# Patient Record
Sex: FEMALE | Race: WHITE | Employment: UNEMPLOYED | URBAN - METROPOLITAN AREA
[De-identification: names, ages, dates, MRNs, and addresses within clinical notes are randomized per-mention and may not be internally consistent; named-entity substitution may affect disease eponyms.]

---

## 2017-01-11 ENCOUNTER — TELEPHONE (OUTPATIENT)
Dept: RHEUMATOLOGY | Age: 52
End: 2017-01-11

## 2017-01-11 ENCOUNTER — TELEPHONE (OUTPATIENT)
Dept: INTERNAL MEDICINE | Age: 52
End: 2017-01-11

## 2017-01-11 RX ORDER — ZOLPIDEM TARTRATE 10 MG/1
TABLET ORAL
Qty: 30 TABLET | Refills: 0 | OUTPATIENT
Start: 2017-01-11 | End: 2017-02-21 | Stop reason: SDUPTHER

## 2017-01-11 RX ORDER — PROMETHAZINE HYDROCHLORIDE 25 MG/1
25 TABLET ORAL EVERY 6 HOURS PRN
Qty: 25 TABLET | Refills: 0 | Status: SHIPPED | OUTPATIENT
Start: 2017-01-11 | End: 2017-02-14

## 2017-02-14 ENCOUNTER — OFFICE VISIT (OUTPATIENT)
Dept: INTERNAL MEDICINE | Age: 52
End: 2017-02-14

## 2017-02-14 VITALS
BODY MASS INDEX: 28.58 KG/M2 | DIASTOLIC BLOOD PRESSURE: 78 MMHG | WEIGHT: 193 LBS | SYSTOLIC BLOOD PRESSURE: 110 MMHG | HEART RATE: 84 BPM | HEIGHT: 69 IN

## 2017-02-14 DIAGNOSIS — F43.22 ADJUSTMENT DISORDER WITH ANXIETY: ICD-10-CM

## 2017-02-14 DIAGNOSIS — R60.9 WATER RETENTION: ICD-10-CM

## 2017-02-14 DIAGNOSIS — F51.04 CHRONIC INSOMNIA: ICD-10-CM

## 2017-02-14 DIAGNOSIS — J45.909 UNCOMPLICATED ASTHMA, UNSPECIFIED ASTHMA SEVERITY: ICD-10-CM

## 2017-02-14 DIAGNOSIS — I10 ESSENTIAL HYPERTENSION: Primary | ICD-10-CM

## 2017-02-14 DIAGNOSIS — I10 ESSENTIAL HYPERTENSION: ICD-10-CM

## 2017-02-14 DIAGNOSIS — M25.50 ARTHRALGIA, UNSPECIFIED JOINT: ICD-10-CM

## 2017-02-14 LAB — TSH REFLEX FT4: 1.95 UIU/ML (ref 0.27–4.2)

## 2017-02-14 PROCEDURE — 99214 OFFICE O/P EST MOD 30 MIN: CPT | Performed by: INTERNAL MEDICINE

## 2017-02-14 RX ORDER — TORSEMIDE 20 MG/1
20 TABLET ORAL DAILY PRN
Qty: 30 TABLET | Refills: 3 | Status: SHIPPED | OUTPATIENT
Start: 2017-02-14 | End: 2017-06-21 | Stop reason: SDUPTHER

## 2017-02-14 RX ORDER — MIRTAZAPINE 7.5 MG/1
7.5 TABLET, FILM COATED ORAL NIGHTLY
Qty: 30 TABLET | Refills: 5 | Status: SHIPPED | OUTPATIENT
Start: 2017-02-14 | End: 2018-01-17

## 2017-02-14 ASSESSMENT — PATIENT HEALTH QUESTIONNAIRE - PHQ9
2. FEELING DOWN, DEPRESSED OR HOPELESS: 0
SUM OF ALL RESPONSES TO PHQ QUESTIONS 1-9: 0
1. LITTLE INTEREST OR PLEASURE IN DOING THINGS: 0
SUM OF ALL RESPONSES TO PHQ9 QUESTIONS 1 & 2: 0

## 2017-02-14 ASSESSMENT — ENCOUNTER SYMPTOMS
GASTROINTESTINAL NEGATIVE: 1
WHEEZING: 1

## 2017-02-15 ENCOUNTER — TELEPHONE (OUTPATIENT)
Dept: INTERNAL MEDICINE | Age: 52
End: 2017-02-15

## 2017-02-21 ENCOUNTER — TELEPHONE (OUTPATIENT)
Dept: INTERNAL MEDICINE | Age: 52
End: 2017-02-21

## 2017-02-21 RX ORDER — ZOLPIDEM TARTRATE 10 MG/1
TABLET ORAL
Qty: 30 TABLET | Refills: 1 | OUTPATIENT
Start: 2017-02-21 | End: 2017-04-17 | Stop reason: SDUPTHER

## 2017-02-27 ENCOUNTER — OFFICE VISIT (OUTPATIENT)
Dept: RHEUMATOLOGY | Age: 52
End: 2017-02-27

## 2017-02-27 VITALS
DIASTOLIC BLOOD PRESSURE: 72 MMHG | WEIGHT: 193.2 LBS | TEMPERATURE: 98.3 F | BODY MASS INDEX: 28.61 KG/M2 | HEIGHT: 69 IN | SYSTOLIC BLOOD PRESSURE: 110 MMHG

## 2017-02-27 DIAGNOSIS — M25.562 CHRONIC PAIN OF BOTH KNEES: Primary | ICD-10-CM

## 2017-02-27 DIAGNOSIS — M25.561 CHRONIC PAIN OF BOTH KNEES: Primary | ICD-10-CM

## 2017-02-27 DIAGNOSIS — G89.29 CHRONIC PAIN OF RIGHT KNEE: ICD-10-CM

## 2017-02-27 DIAGNOSIS — R76.8 POSITIVE ANA (ANTINUCLEAR ANTIBODY): ICD-10-CM

## 2017-02-27 DIAGNOSIS — M25.561 CHRONIC PAIN OF RIGHT KNEE: ICD-10-CM

## 2017-02-27 DIAGNOSIS — G89.29 CHRONIC PAIN OF BOTH KNEES: Primary | ICD-10-CM

## 2017-02-27 PROCEDURE — 99214 OFFICE O/P EST MOD 30 MIN: CPT | Performed by: INTERNAL MEDICINE

## 2017-02-27 RX ORDER — TRAZODONE HYDROCHLORIDE 100 MG/1
100 TABLET ORAL NIGHTLY
COMMUNITY
End: 2017-06-28 | Stop reason: SDUPTHER

## 2017-04-14 ENCOUNTER — TELEPHONE (OUTPATIENT)
Dept: INTERNAL MEDICINE | Age: 52
End: 2017-04-14

## 2017-04-17 RX ORDER — ZOLPIDEM TARTRATE 10 MG/1
TABLET ORAL
Qty: 30 TABLET | Refills: 1 | Status: SHIPPED | OUTPATIENT
Start: 2017-04-17 | End: 2017-06-14 | Stop reason: SDUPTHER

## 2017-06-13 ENCOUNTER — OFFICE VISIT (OUTPATIENT)
Dept: RHEUMATOLOGY | Age: 52
End: 2017-06-13

## 2017-06-13 VITALS
SYSTOLIC BLOOD PRESSURE: 120 MMHG | TEMPERATURE: 98 F | HEIGHT: 69 IN | DIASTOLIC BLOOD PRESSURE: 80 MMHG | BODY MASS INDEX: 29.33 KG/M2 | WEIGHT: 198 LBS

## 2017-06-13 DIAGNOSIS — M19.90 INFLAMMATORY ARTHRITIS: Primary | ICD-10-CM

## 2017-06-13 DIAGNOSIS — M15.9 PRIMARY OSTEOARTHRITIS INVOLVING MULTIPLE JOINTS: ICD-10-CM

## 2017-06-13 DIAGNOSIS — M19.90 INFLAMMATORY ARTHRITIS: ICD-10-CM

## 2017-06-13 LAB
C-REACTIVE PROTEIN: 2 MG/L (ref 0–5.1)
SEDIMENTATION RATE, ERYTHROCYTE: 10 MM/HR (ref 0–30)

## 2017-06-13 PROCEDURE — 99214 OFFICE O/P EST MOD 30 MIN: CPT | Performed by: INTERNAL MEDICINE

## 2017-06-13 RX ORDER — HYDROXYCHLOROQUINE SULFATE 200 MG/1
TABLET, FILM COATED ORAL
Qty: 60 TABLET | Refills: 2 | Status: SHIPPED | OUTPATIENT
Start: 2017-06-13 | End: 2017-06-28

## 2017-06-13 RX ORDER — PREDNISONE 10 MG/1
TABLET ORAL
Qty: 25 TABLET | Refills: 0 | Status: SHIPPED | OUTPATIENT
Start: 2017-06-13 | End: 2017-06-28

## 2017-06-14 ENCOUNTER — TELEPHONE (OUTPATIENT)
Dept: RHEUMATOLOGY | Age: 52
End: 2017-06-14

## 2017-06-14 RX ORDER — ZOLPIDEM TARTRATE 10 MG/1
TABLET ORAL
Qty: 30 TABLET | Refills: 1 | OUTPATIENT
Start: 2017-06-14 | End: 2017-08-11 | Stop reason: SDUPTHER

## 2017-06-21 RX ORDER — TORSEMIDE 20 MG/1
TABLET ORAL
Qty: 30 TABLET | Refills: 2 | Status: SHIPPED | OUTPATIENT
Start: 2017-06-21 | End: 2017-09-26 | Stop reason: SDUPTHER

## 2017-06-22 ENCOUNTER — TELEPHONE (OUTPATIENT)
Dept: INTERNAL MEDICINE | Age: 52
End: 2017-06-22

## 2017-06-28 ENCOUNTER — OFFICE VISIT (OUTPATIENT)
Dept: INTERNAL MEDICINE | Age: 52
End: 2017-06-28

## 2017-06-28 VITALS
SYSTOLIC BLOOD PRESSURE: 130 MMHG | WEIGHT: 197 LBS | BODY MASS INDEX: 29.18 KG/M2 | DIASTOLIC BLOOD PRESSURE: 90 MMHG | HEIGHT: 69 IN | HEART RATE: 84 BPM

## 2017-06-28 DIAGNOSIS — N95.1 MENOPAUSAL SYMPTOMS: ICD-10-CM

## 2017-06-28 DIAGNOSIS — F43.22 ADJUSTMENT DISORDER WITH ANXIETY: ICD-10-CM

## 2017-06-28 DIAGNOSIS — G47.30 SLEEP APNEA, UNSPECIFIED TYPE: ICD-10-CM

## 2017-06-28 DIAGNOSIS — R60.9 WATER RETENTION: ICD-10-CM

## 2017-06-28 DIAGNOSIS — I10 ESSENTIAL HYPERTENSION: ICD-10-CM

## 2017-06-28 DIAGNOSIS — F51.04 CHRONIC INSOMNIA: ICD-10-CM

## 2017-06-28 DIAGNOSIS — M25.50 ARTHRALGIA, UNSPECIFIED JOINT: ICD-10-CM

## 2017-06-28 DIAGNOSIS — F10.10 ALCOHOL ABUSE: ICD-10-CM

## 2017-06-28 LAB — FOLLICLE STIMULATING HORMONE: 75.8 MIU/ML

## 2017-06-28 PROCEDURE — 99214 OFFICE O/P EST MOD 30 MIN: CPT | Performed by: INTERNAL MEDICINE

## 2017-06-28 RX ORDER — TRAZODONE HYDROCHLORIDE 100 MG/1
TABLET ORAL
Qty: 60 TABLET | Refills: 5 | Status: SHIPPED | OUTPATIENT
Start: 2017-06-28 | End: 2017-12-07 | Stop reason: SDUPTHER

## 2017-06-28 RX ORDER — ESTRADIOL 0.5 MG/1
0.5 TABLET ORAL DAILY
Qty: 30 TABLET | Refills: 5 | Status: SHIPPED | OUTPATIENT
Start: 2017-06-28 | End: 2018-01-17

## 2017-06-28 RX ORDER — POTASSIUM CHLORIDE 750 MG/1
10 CAPSULE, EXTENDED RELEASE ORAL DAILY
Qty: 90 CAPSULE | Refills: 3 | Status: SHIPPED | OUTPATIENT
Start: 2017-06-28 | End: 2018-07-26 | Stop reason: SDUPTHER

## 2017-06-28 ASSESSMENT — ENCOUNTER SYMPTOMS: SHORTNESS OF BREATH: 0

## 2017-08-11 RX ORDER — ZOLPIDEM TARTRATE 10 MG/1
TABLET ORAL
Qty: 30 TABLET | Refills: 1 | OUTPATIENT
Start: 2017-08-11 | End: 2017-10-06 | Stop reason: SDUPTHER

## 2017-09-05 ENCOUNTER — OFFICE VISIT (OUTPATIENT)
Dept: INTERNAL MEDICINE | Age: 52
End: 2017-09-05

## 2017-09-05 VITALS
SYSTOLIC BLOOD PRESSURE: 138 MMHG | DIASTOLIC BLOOD PRESSURE: 88 MMHG | HEIGHT: 69 IN | WEIGHT: 203 LBS | BODY MASS INDEX: 30.07 KG/M2 | TEMPERATURE: 98.4 F

## 2017-09-05 DIAGNOSIS — J45.909 UNCOMPLICATED ASTHMA, UNSPECIFIED ASTHMA SEVERITY: ICD-10-CM

## 2017-09-05 DIAGNOSIS — F43.22 ADJUSTMENT DISORDER WITH ANXIETY: ICD-10-CM

## 2017-09-05 DIAGNOSIS — K21.9 GASTROESOPHAGEAL REFLUX DISEASE, ESOPHAGITIS PRESENCE NOT SPECIFIED: ICD-10-CM

## 2017-09-05 DIAGNOSIS — F51.04 CHRONIC INSOMNIA: ICD-10-CM

## 2017-09-05 PROCEDURE — 99214 OFFICE O/P EST MOD 30 MIN: CPT | Performed by: INTERNAL MEDICINE

## 2017-09-05 RX ORDER — OMEPRAZOLE 40 MG/1
40 CAPSULE, DELAYED RELEASE ORAL DAILY
Qty: 30 CAPSULE | Refills: 3 | Status: SHIPPED | OUTPATIENT
Start: 2017-09-05 | End: 2017-12-07 | Stop reason: SDUPTHER

## 2017-09-05 RX ORDER — OMEPRAZOLE 40 MG/1
40 CAPSULE, DELAYED RELEASE ORAL DAILY
Qty: 30 CAPSULE | Refills: 3 | Status: SHIPPED | OUTPATIENT
Start: 2017-09-05 | End: 2017-09-05 | Stop reason: SDUPTHER

## 2017-09-05 ASSESSMENT — ENCOUNTER SYMPTOMS
TROUBLE SWALLOWING: 1
ABDOMINAL PAIN: 0
WHEEZING: 1
NAUSEA: 1
ABDOMINAL DISTENTION: 0

## 2017-09-20 ENCOUNTER — HOSPITAL ENCOUNTER (OUTPATIENT)
Dept: MAMMOGRAPHY | Age: 52
Discharge: HOME | End: 2017-09-20
Attending: OBSTETRICS & GYNECOLOGY | Admitting: OBSTETRICS & GYNECOLOGY

## 2017-09-20 DIAGNOSIS — Z12.31 VISIT FOR SCREENING MAMMOGRAM: ICD-10-CM

## 2017-09-27 RX ORDER — TORSEMIDE 20 MG/1
TABLET ORAL
Qty: 30 TABLET | Refills: 1 | Status: SHIPPED | OUTPATIENT
Start: 2017-09-27 | End: 2017-11-28 | Stop reason: SDUPTHER

## 2017-10-06 ENCOUNTER — TELEPHONE (OUTPATIENT)
Dept: INTERNAL MEDICINE | Age: 52
End: 2017-10-06

## 2017-10-06 RX ORDER — ZOLPIDEM TARTRATE 10 MG/1
TABLET ORAL
Qty: 6 TABLET | Refills: 0 | OUTPATIENT
Start: 2017-10-06 | End: 2017-10-18 | Stop reason: SDUPTHER

## 2017-10-09 ENCOUNTER — TELEPHONE (OUTPATIENT)
Dept: INTERNAL MEDICINE | Age: 52
End: 2017-10-09

## 2017-10-10 ENCOUNTER — TELEPHONE (OUTPATIENT)
Dept: INTERNAL MEDICINE | Age: 52
End: 2017-10-10

## 2017-10-13 ENCOUNTER — OFFICE VISIT (OUTPATIENT)
Dept: CARDIOLOGY CLINIC | Age: 52
End: 2017-10-13

## 2017-10-13 VITALS
HEART RATE: 68 BPM | SYSTOLIC BLOOD PRESSURE: 120 MMHG | DIASTOLIC BLOOD PRESSURE: 70 MMHG | BODY MASS INDEX: 29.56 KG/M2 | WEIGHT: 200.2 LBS

## 2017-10-13 DIAGNOSIS — I10 ESSENTIAL HYPERTENSION: ICD-10-CM

## 2017-10-13 DIAGNOSIS — I48.0 PAROXYSMAL ATRIAL FIBRILLATION (HCC): Primary | ICD-10-CM

## 2017-10-13 PROCEDURE — 93000 ELECTROCARDIOGRAM COMPLETE: CPT | Performed by: INTERNAL MEDICINE

## 2017-10-13 PROCEDURE — 99204 OFFICE O/P NEW MOD 45 MIN: CPT | Performed by: INTERNAL MEDICINE

## 2017-10-13 RX ORDER — FLECAINIDE ACETATE 50 MG/1
TABLET ORAL
Refills: 0 | COMMUNITY
Start: 2017-10-11 | End: 2017-11-07 | Stop reason: SDUPTHER

## 2017-10-13 RX ORDER — APIXABAN 5 MG/1
TABLET, FILM COATED ORAL
Refills: 0 | COMMUNITY
Start: 2017-10-09 | End: 2017-11-07 | Stop reason: SDUPTHER

## 2017-10-13 ASSESSMENT — ENCOUNTER SYMPTOMS
CHOKING: 0
COUGH: 0
CHEST TIGHTNESS: 0
SHORTNESS OF BREATH: 0

## 2017-10-13 NOTE — PROGRESS NOTES
Subjective:      Patient ID: Fermin Richardson is a 47 y.o. female. HPI Referred for afib. Admitted in [address] [place] [address] with afib with RVR. Awoke with afib Sunday. Tachycardic, sick and weak. No chest pain. No syncope. Felt light headed. In hospital finally used flecanide. Converted. 3 nights ago had HA and palp. Went back to hospital showed PVC. Doing ok since back. No previous heart issues. May have been having palp over several months. Lasting 10-15 minutes. Drinks occasionally. Nothing heavy. No drugs. Exercises daily. No exertional sx. .    Past Medical History:   Diagnosis Date    Alcohol abuse, in remission     Anxiety state, unspecified     Depressive disorder, not elsewhere classified     Diffuse cystic mastopathy     Extrinsic asthma, unspecified     Hypertension     Leiomyoma of uterus, unspecified     Other and unspecified ovarian cyst      Past Surgical History:   Procedure Laterality Date    CHOLECYSTECTOMY      HYSTERECTOMY       Social History     Social History    Marital status:      Spouse name: N/A    Number of children: N/A    Years of education: N/A     Occupational History    Not on file. Social History Main Topics    Smoking status: Never Smoker    Smokeless tobacco: Never Used    Alcohol use 0.6 - 1.2 oz/week     1 - 2 Glasses of wine per week      Comment: 2 drinks qd    Drug use: No    Sexual activity: Yes     Partners: Male     Other Topics Concern    Not on file     Social History Narrative    No narrative on file       Vitals:    10/13/17 1101   BP: 120/70   Pulse: 68     \   Review of Systems   Constitutional: Negative for activity change, appetite change and fatigue. Respiratory: Negative for cough, choking, chest tightness and shortness of breath. Cardiovascular: Negative for chest pain, palpitations and leg swelling. Denies PND or orthopnea. No tachycardia or syncope.     Neurological: Negative for dizziness, syncope and light-headedness. Psychiatric/Behavioral: Negative for agitation, behavioral problems and confusion. Otherwise negative as done. Objective:   Physical Exam   Constitutional: She is oriented to person, place, and time. She appears well-developed and well-nourished. No distress. HENT:   Head: Normocephalic and atraumatic. Eyes: Conjunctivae and EOM are normal. Right eye exhibits no discharge. Left eye exhibits no discharge. Neck: Normal range of motion. No JVD present. Cardiovascular: Normal rate, regular rhythm, S1 normal, S2 normal and normal heart sounds. Exam reveals no gallop. No murmur heard. Pulses:       Radial pulses are 2+ on the right side, and 2+ on the left side. Pulmonary/Chest: Effort normal and breath sounds normal. No respiratory distress. She has no wheezes. She has no rales. Abdominal: Soft. Bowel sounds are normal. There is no tenderness. Musculoskeletal: Normal range of motion. She exhibits no edema or tenderness. Neurological: She is alert and oriented to person, place, and time. Skin: Skin is warm and dry. Psychiatric: She has a normal mood and affect. Her behavior is normal. Thought content normal.       Assessment:      1. Paroxysmal atrial fibrillation (HCC)  EKG 12 Lead   2. Essential hypertension  EKG 12 Lead            Plan:      Had bout of afib. EKG today shows NSR, WNL. On eliquis. On flecanide. Stable since home. Outside records. Head CT negative. CTPA negative for PE. Echo showing normal LV function mild TR with RVSP 36. myoview non dx pharm but negative for ischemia. Continue AC and flecanide. Follow up 4-6 wks.

## 2017-10-18 RX ORDER — ZOLPIDEM TARTRATE 10 MG/1
TABLET ORAL
Qty: 30 TABLET | Refills: 1 | OUTPATIENT
Start: 2017-10-18 | End: 2017-12-07 | Stop reason: SDUPTHER

## 2017-11-03 ENCOUNTER — OFFICE VISIT (OUTPATIENT)
Dept: CARDIOLOGY CLINIC | Age: 52
End: 2017-11-03

## 2017-11-03 VITALS
WEIGHT: 201 LBS | BODY MASS INDEX: 29.68 KG/M2 | HEART RATE: 68 BPM | SYSTOLIC BLOOD PRESSURE: 108 MMHG | DIASTOLIC BLOOD PRESSURE: 70 MMHG

## 2017-11-03 DIAGNOSIS — I48.0 PAROXYSMAL ATRIAL FIBRILLATION (HCC): Primary | ICD-10-CM

## 2017-11-03 DIAGNOSIS — I10 ESSENTIAL HYPERTENSION: ICD-10-CM

## 2017-11-03 PROCEDURE — 99213 OFFICE O/P EST LOW 20 MIN: CPT | Performed by: INTERNAL MEDICINE

## 2017-11-03 ASSESSMENT — ENCOUNTER SYMPTOMS
SHORTNESS OF BREATH: 0
COUGH: 0
CHEST TIGHTNESS: 0
CHOKING: 0

## 2017-11-03 NOTE — PROGRESS NOTES
Subjective:      Patient ID: Amarilys Gonzalez is a 47 y.o. female. Fatigue   Associated symptoms include fatigue. Pertinent negatives include no chest pain or coughing. Here for follow up afib/htn. Had some vague chest sx. Belching helped. Under stress. Active. No exertional.   No tachycardia. No pnd or orthopnea. She is lethargic. Past Medical History:   Diagnosis Date    Alcohol abuse, in remission     Anxiety state, unspecified     Depressive disorder, not elsewhere classified     Diffuse cystic mastopathy     Extrinsic asthma, unspecified     Hypertension     Leiomyoma of uterus, unspecified     Other and unspecified ovarian cyst      Past Surgical History:   Procedure Laterality Date    CHOLECYSTECTOMY      HYSTERECTOMY       Social History     Social History    Marital status:      Spouse name: N/A    Number of children: N/A    Years of education: N/A     Occupational History    Not on file. Social History Main Topics    Smoking status: Never Smoker    Smokeless tobacco: Never Used    Alcohol use 0.6 - 1.2 oz/week     1 - 2 Glasses of wine per week      Comment: 2 drinks qd    Drug use: No    Sexual activity: Yes     Partners: Male     Other Topics Concern    Not on file     Social History Narrative    No narrative on file       Vitals:    11/03/17 1044   BP: 108/70   Pulse: 68       Review of Systems   Constitutional: Positive for fatigue. Negative for activity change and appetite change. Respiratory: Negative for cough, choking, chest tightness and shortness of breath. Cardiovascular: Negative for chest pain, palpitations and leg swelling. Denies PND or orthopnea. No tachycardia or syncope. Neurological: Negative for dizziness, syncope and light-headedness. Psychiatric/Behavioral: Negative for agitation, behavioral problems and confusion. Other systems reviewed negative as done.      Objective:   Physical Exam   Constitutional: She is oriented to person, place, and time. She appears well-developed and well-nourished. No distress. HENT:   Head: Normocephalic and atraumatic. Eyes: Conjunctivae and EOM are normal. Right eye exhibits no discharge. Left eye exhibits no discharge. Neck: Normal range of motion. No JVD present. Cardiovascular: Normal rate, regular rhythm, S1 normal, S2 normal and normal heart sounds. Exam reveals no gallop. No murmur heard. Pulses:       Radial pulses are 2+ on the right side, and 2+ on the left side. Pulmonary/Chest: Effort normal and breath sounds normal. No respiratory distress. She has no wheezes. She has no rales. Abdominal: Soft. Bowel sounds are normal. There is no tenderness. Musculoskeletal: Normal range of motion. She exhibits no edema or tenderness. Neurological: She is alert and oriented to person, place, and time. Skin: Skin is warm and dry. Psychiatric: She has a normal mood and affect. Her behavior is normal. Thought content normal.       Assessment:      1. Paroxysmal atrial fibrillation (HCC)     2. Essential hypertension              Plan:      CV stable. Rhythm appears stable. On eliquis. No bleeding issues. On flecanide. Reviewed outside records. Head CT negative. CTPA negative for PE. Echo showing normal LV function mild TR with RVSP 36. myoview non dx pharm but negative for ischemia. Continue AC and flecanide. Follow up 2 months. [facility]

## 2017-11-07 RX ORDER — FLECAINIDE ACETATE 50 MG/1
TABLET ORAL
Qty: 60 TABLET | Refills: 5 | Status: SHIPPED | OUTPATIENT
Start: 2017-11-07 | End: 2017-12-07 | Stop reason: SDUPTHER

## 2017-11-10 ENCOUNTER — TELEPHONE (OUTPATIENT)
Dept: CARDIOLOGY CLINIC | Age: 52
End: 2017-11-10

## 2017-11-10 RX ORDER — APIXABAN 5 MG/1
TABLET, FILM COATED ORAL
Qty: 60 TABLET | Refills: 5 | Status: SHIPPED | OUTPATIENT
Start: 2017-11-10 | End: 2017-12-07 | Stop reason: SDUPTHER

## 2017-11-10 RX ORDER — APIXABAN 5 MG/1
TABLET, FILM COATED ORAL
Qty: 60 TABLET | Refills: 5 | OUTPATIENT
Start: 2017-11-10

## 2017-11-28 ENCOUNTER — TELEPHONE (OUTPATIENT)
Dept: INTERNAL MEDICINE | Age: 52
End: 2017-11-28

## 2017-11-28 RX ORDER — TORSEMIDE 20 MG/1
TABLET ORAL
Qty: 30 TABLET | Refills: 2 | Status: SHIPPED | OUTPATIENT
Start: 2017-11-28 | End: 2017-12-07 | Stop reason: SDUPTHER

## 2017-11-28 NOTE — TELEPHONE ENCOUNTER
Asking for 4 day early refill around 12/9 on her  Ambien, Trazodone & Potassium.     Asked her to call back around then if MD approves

## 2017-12-07 ENCOUNTER — TELEPHONE (OUTPATIENT)
Dept: INTERNAL MEDICINE | Age: 52
End: 2017-12-07

## 2017-12-07 RX ORDER — FLECAINIDE ACETATE 50 MG/1
TABLET ORAL
Qty: 60 TABLET | Refills: 5 | Status: SHIPPED | OUTPATIENT
Start: 2017-12-07 | End: 2018-05-27 | Stop reason: SDUPTHER

## 2017-12-07 RX ORDER — ZOLPIDEM TARTRATE 10 MG/1
TABLET ORAL
Qty: 30 TABLET | Refills: 1 | OUTPATIENT
Start: 2017-12-07 | End: 2018-02-05 | Stop reason: SDUPTHER

## 2017-12-07 RX ORDER — PREDNISONE 10 MG/1
TABLET ORAL
Qty: 22 TABLET | Refills: 0 | Status: SHIPPED | OUTPATIENT
Start: 2017-12-07 | End: 2018-01-17

## 2017-12-07 RX ORDER — TRAZODONE HYDROCHLORIDE 100 MG/1
TABLET ORAL
Qty: 60 TABLET | Refills: 5 | Status: SHIPPED | OUTPATIENT
Start: 2017-12-07 | End: 2018-05-27 | Stop reason: SDUPTHER

## 2017-12-07 RX ORDER — AZITHROMYCIN 250 MG/1
TABLET, FILM COATED ORAL
Qty: 1 PACKET | Refills: 0 | Status: SHIPPED | OUTPATIENT
Start: 2017-12-07 | End: 2017-12-17

## 2017-12-07 RX ORDER — OMEPRAZOLE 40 MG/1
40 CAPSULE, DELAYED RELEASE ORAL DAILY
Qty: 30 CAPSULE | Refills: 3 | Status: SHIPPED | OUTPATIENT
Start: 2017-12-07 | End: 2018-05-27 | Stop reason: SDUPTHER

## 2017-12-07 RX ORDER — APIXABAN 5 MG/1
TABLET, FILM COATED ORAL
Qty: 60 TABLET | Refills: 5 | Status: SHIPPED | OUTPATIENT
Start: 2017-12-07 | End: 2018-07-27 | Stop reason: SDUPTHER

## 2017-12-07 RX ORDER — TORSEMIDE 20 MG/1
TABLET ORAL
Qty: 30 TABLET | Refills: 2 | Status: SHIPPED | OUTPATIENT
Start: 2017-12-07 | End: 2018-04-20 | Stop reason: SDUPTHER

## 2017-12-07 NOTE — TELEPHONE ENCOUNTER
Mahi calling from [employer] and would like to be advised if Spring Vasquez MD is ok with pt taking flecainide (TAMBOCOR) 50 MG tablet and z jose due to increase risk in arrhythmias   Aware that Spring Vasquez MD out however message will be sent   Please advise

## 2017-12-07 NOTE — TELEPHONE ENCOUNTER
Pt would like to know if Dr. Sacha Sargent can call something in for her. Pts symptoms are cough, mucus, nasal drip, not feeling well at all. Pt sounds out of breath. Pt feels like she might need to use her inhaler. It started about 2 days ago. Pt has been taking Benadryl. Pharmacy info above. Patient also following up to written rxs due to losing her insurance. Patient would like to speak to [place] prior to us witting the rxs. Please see previous phone note for information.

## 2017-12-08 RX ORDER — DOXYCYCLINE HYCLATE 100 MG
100 TABLET ORAL 2 TIMES DAILY
Qty: 20 TABLET | Refills: 0 | OUTPATIENT
Start: 2017-12-08 | End: 2017-12-29 | Stop reason: SDUPTHER

## 2017-12-27 ENCOUNTER — TELEPHONE (OUTPATIENT)
Dept: INTERNAL MEDICINE | Age: 52
End: 2017-12-27

## 2017-12-27 RX ORDER — CIPROFLOXACIN 500 MG/1
500 TABLET, FILM COATED ORAL 2 TIMES DAILY
Qty: 20 TABLET | Refills: 0 | Status: SHIPPED | OUTPATIENT
Start: 2017-12-27 | End: 2018-01-06

## 2017-12-27 NOTE — TELEPHONE ENCOUNTER
Pt would like to know if Dr. Jmaes Puckett can call something in for her. Pt does not currently have insurance. Pt will have insurance as of January 1, 2018. Pts symptoms are thick green mucus, drainage, nose is stopped up, head is congested, no energy, a little bit of diarrhea Pt feels like she is choking on the mucus. It started on 12-8-2017. Pt finished an rx of doxycycline hyclate (VIBRA-TABS) 100 MG tablet and is currently taking Prednisone. Pharmacy info above. Please call pt.

## 2017-12-28 ENCOUNTER — TELEPHONE (OUTPATIENT)
Dept: INTERNAL MEDICINE | Age: 52
End: 2017-12-28

## 2017-12-28 RX ORDER — AZITHROMYCIN 250 MG/1
TABLET, FILM COATED ORAL
Qty: 1 PACKET | Refills: 0 | Status: SHIPPED | OUTPATIENT
Start: 2017-12-28 | End: 2018-01-07

## 2017-12-29 ENCOUNTER — TELEPHONE (OUTPATIENT)
Dept: INTERNAL MEDICINE | Age: 52
End: 2017-12-29

## 2017-12-29 RX ORDER — LEVOFLOXACIN 500 MG/1
500 TABLET, FILM COATED ORAL DAILY
Qty: 7 TABLET | Refills: 0 | OUTPATIENT
Start: 2017-12-29 | End: 2017-12-29

## 2017-12-29 RX ORDER — DOXYCYCLINE HYCLATE 100 MG
100 TABLET ORAL 2 TIMES DAILY
Qty: 20 TABLET | Refills: 0 | Status: SHIPPED | OUTPATIENT
Start: 2017-12-29 | End: 2018-01-08

## 2017-12-29 NOTE — TELEPHONE ENCOUNTER
Try Levaquin 500 mg    #7            One daily  I don't know what this will cost her, but because of her allergies are choices are limited.

## 2017-12-29 NOTE — TELEPHONE ENCOUNTER
Per the pharmacy  The medication rx'ed, Dawson Norman has a major interaction with the medicaiton flecainide (TAMBOCOR) 50 MG tab . PLease advise on another medication.

## 2018-01-08 ENCOUNTER — TELEPHONE (OUTPATIENT)
Dept: INTERNAL MEDICINE | Age: 53
End: 2018-01-08

## 2018-01-08 NOTE — TELEPHONE ENCOUNTER
Pt requesting a call back from Gerardo Cadena MD MA regarding a very complex insurance matter regarding a coding and billing issue pt was routed to [address]   Please call pt back at [phone]

## 2018-01-10 ENCOUNTER — TELEPHONE (OUTPATIENT)
Dept: INTERNAL MEDICINE | Age: 53
End: 2018-01-10

## 2018-01-15 ENCOUNTER — TELEPHONE (OUTPATIENT)
Dept: INTERNAL MEDICINE | Age: 53
End: 2018-01-15

## 2018-01-15 NOTE — TELEPHONE ENCOUNTER
Pt calling back and was seen at [facility] on 1.11.18  Pt is requesting to be seen by Tea Ortiz MD only  Please call pt back today at [phone]

## 2018-01-17 ENCOUNTER — OFFICE VISIT (OUTPATIENT)
Dept: INTERNAL MEDICINE | Age: 53
End: 2018-01-17

## 2018-01-17 VITALS
WEIGHT: 207 LBS | HEIGHT: 69 IN | DIASTOLIC BLOOD PRESSURE: 78 MMHG | SYSTOLIC BLOOD PRESSURE: 118 MMHG | TEMPERATURE: 98.4 F | BODY MASS INDEX: 30.66 KG/M2

## 2018-01-17 DIAGNOSIS — J45.901 ASTHMATIC BRONCHITIS WITH ACUTE EXACERBATION, UNSPECIFIED ASTHMA SEVERITY, UNSPECIFIED WHETHER PERSISTENT: Primary | ICD-10-CM

## 2018-01-17 PROCEDURE — 94640 AIRWAY INHALATION TREATMENT: CPT | Performed by: INTERNAL MEDICINE

## 2018-01-17 PROCEDURE — 99213 OFFICE O/P EST LOW 20 MIN: CPT | Performed by: INTERNAL MEDICINE

## 2018-01-17 RX ORDER — ALBUTEROL SULFATE 2.5 MG/3ML
2.5 SOLUTION RESPIRATORY (INHALATION) ONCE
Status: COMPLETED | OUTPATIENT
Start: 2018-01-17 | End: 2018-01-17

## 2018-01-17 RX ORDER — BENZONATATE 100 MG/1
100 CAPSULE ORAL 3 TIMES DAILY PRN
Qty: 30 CAPSULE | Refills: 1 | Status: SHIPPED | OUTPATIENT
Start: 2018-01-17 | End: 2018-01-24

## 2018-01-17 RX ADMIN — Medication 0.5 MG: at 08:29

## 2018-01-17 RX ADMIN — ALBUTEROL SULFATE 2.5 MG: 2.5 SOLUTION RESPIRATORY (INHALATION) at 08:26

## 2018-01-17 ASSESSMENT — ENCOUNTER SYMPTOMS
WHEEZING: 1
TROUBLE SWALLOWING: 0
SORE THROAT: 0
GASTROINTESTINAL NEGATIVE: 1
SHORTNESS OF BREATH: 1
COUGH: 1

## 2018-01-19 ENCOUNTER — OFFICE VISIT (OUTPATIENT)
Dept: CARDIOLOGY CLINIC | Age: 53
End: 2018-01-19

## 2018-01-19 VITALS
BODY MASS INDEX: 30.27 KG/M2 | DIASTOLIC BLOOD PRESSURE: 82 MMHG | SYSTOLIC BLOOD PRESSURE: 128 MMHG | WEIGHT: 205 LBS | HEART RATE: 62 BPM

## 2018-01-19 DIAGNOSIS — I48.0 PAROXYSMAL ATRIAL FIBRILLATION (HCC): Primary | ICD-10-CM

## 2018-01-19 DIAGNOSIS — R94.31 ABNORMAL EKG: ICD-10-CM

## 2018-01-19 DIAGNOSIS — I10 ESSENTIAL HYPERTENSION: ICD-10-CM

## 2018-01-19 PROCEDURE — 99214 OFFICE O/P EST MOD 30 MIN: CPT | Performed by: INTERNAL MEDICINE

## 2018-01-19 ASSESSMENT — ENCOUNTER SYMPTOMS
SHORTNESS OF BREATH: 0
COUGH: 0
CHOKING: 0
CHEST TIGHTNESS: 0

## 2018-02-05 ENCOUNTER — TELEPHONE (OUTPATIENT)
Dept: INTERNAL MEDICINE | Age: 53
End: 2018-02-05

## 2018-02-05 RX ORDER — ZOLPIDEM TARTRATE 10 MG/1
TABLET ORAL
Qty: 30 TABLET | Refills: 1 | OUTPATIENT
Start: 2018-02-05 | End: 2018-03-28 | Stop reason: SDUPTHER

## 2018-02-05 NOTE — TELEPHONE ENCOUNTER
Pt requesting refill zolpidem (AMBIEN) 10 MG tablet. Pt states she has been waiting a few days for refill. Pharmacy on file verified. Please call.

## 2018-02-13 DIAGNOSIS — J45.901 ASTHMATIC BRONCHITIS WITH ACUTE EXACERBATION, UNSPECIFIED ASTHMA SEVERITY, UNSPECIFIED WHETHER PERSISTENT: ICD-10-CM

## 2018-02-16 RX ORDER — FLUTICASONE FUROATE AND VILANTEROL 100; 25 UG/1; UG/1
1 POWDER RESPIRATORY (INHALATION) DAILY
Qty: 1 EACH | Refills: 5 | Status: SHIPPED | OUTPATIENT
Start: 2018-02-16 | End: 2019-09-09 | Stop reason: SDUPTHER

## 2018-02-27 DIAGNOSIS — J45.901 ASTHMATIC BRONCHITIS WITH ACUTE EXACERBATION, UNSPECIFIED ASTHMA SEVERITY, UNSPECIFIED WHETHER PERSISTENT: ICD-10-CM

## 2018-03-28 ENCOUNTER — TELEPHONE (OUTPATIENT)
Dept: INTERNAL MEDICINE | Age: 53
End: 2018-03-28

## 2018-03-28 RX ORDER — ZOLPIDEM TARTRATE 10 MG/1
TABLET ORAL
Qty: 30 TABLET | Refills: 1 | OUTPATIENT
Start: 2018-03-28 | End: 2018-05-31 | Stop reason: SDUPTHER

## 2018-04-20 ENCOUNTER — OFFICE VISIT (OUTPATIENT)
Dept: CARDIOLOGY CLINIC | Age: 53
End: 2018-04-20

## 2018-04-20 VITALS
WEIGHT: 198 LBS | HEART RATE: 68 BPM | DIASTOLIC BLOOD PRESSURE: 80 MMHG | SYSTOLIC BLOOD PRESSURE: 118 MMHG | BODY MASS INDEX: 29.24 KG/M2

## 2018-04-20 DIAGNOSIS — I10 ESSENTIAL HYPERTENSION: ICD-10-CM

## 2018-04-20 DIAGNOSIS — R94.31 ABNORMAL EKG: ICD-10-CM

## 2018-04-20 DIAGNOSIS — I48.0 PAROXYSMAL ATRIAL FIBRILLATION (HCC): Primary | ICD-10-CM

## 2018-04-20 PROCEDURE — 99214 OFFICE O/P EST MOD 30 MIN: CPT | Performed by: INTERNAL MEDICINE

## 2018-04-20 ASSESSMENT — ENCOUNTER SYMPTOMS
CHEST TIGHTNESS: 0
CHOKING: 0
SHORTNESS OF BREATH: 0
COUGH: 0

## 2018-05-29 RX ORDER — TORSEMIDE 20 MG/1
TABLET ORAL
Qty: 30 TABLET | Refills: 0 | Status: SHIPPED | OUTPATIENT
Start: 2018-05-29 | End: 2018-07-27 | Stop reason: SDUPTHER

## 2018-05-29 RX ORDER — FLECAINIDE ACETATE 50 MG/1
TABLET ORAL
Qty: 60 TABLET | Refills: 0 | Status: SHIPPED | OUTPATIENT
Start: 2018-05-29 | End: 2018-06-26 | Stop reason: SDUPTHER

## 2018-05-29 RX ORDER — TRAZODONE HYDROCHLORIDE 100 MG/1
TABLET ORAL
Qty: 60 TABLET | Refills: 0 | Status: SHIPPED | OUTPATIENT
Start: 2018-05-29 | End: 2018-06-26 | Stop reason: SDUPTHER

## 2018-05-29 RX ORDER — OMEPRAZOLE 40 MG/1
40 CAPSULE, DELAYED RELEASE ORAL DAILY
Qty: 30 CAPSULE | Refills: 0 | Status: SHIPPED | OUTPATIENT
Start: 2018-05-29 | End: 2018-06-26 | Stop reason: SDUPTHER

## 2018-05-31 ENCOUNTER — TELEPHONE (OUTPATIENT)
Dept: INTERNAL MEDICINE | Age: 53
End: 2018-05-31

## 2018-05-31 DIAGNOSIS — G47.30 SLEEP APNEA, UNSPECIFIED TYPE: Primary | ICD-10-CM

## 2018-05-31 RX ORDER — ZOLPIDEM TARTRATE 10 MG/1
TABLET ORAL
Qty: 30 TABLET | Refills: 1 | OUTPATIENT
Start: 2018-05-31 | End: 2018-06-28

## 2018-06-11 ENCOUNTER — TELEPHONE (OUTPATIENT)
Dept: INTERNAL MEDICINE | Age: 53
End: 2018-06-11

## 2018-06-11 ENCOUNTER — HOSPITAL ENCOUNTER (OUTPATIENT)
Dept: OTHER | Age: 53
Discharge: HOME | End: 2018-06-11
Attending: INTERNAL MEDICINE | Admitting: INTERNAL MEDICINE

## 2018-06-11 DIAGNOSIS — A02.9 SALMONELLA: Primary | ICD-10-CM

## 2018-06-12 ENCOUNTER — HOSPITAL ENCOUNTER (OUTPATIENT)
Dept: OTHER | Age: 53
Discharge: HOME | End: 2018-06-12
Attending: INTERNAL MEDICINE | Admitting: INTERNAL MEDICINE

## 2018-06-13 LAB — GI BACTERIAL PATHOGENS BY PCR: NORMAL

## 2018-06-26 RX ORDER — FLECAINIDE ACETATE 50 MG/1
TABLET ORAL
Qty: 60 TABLET | Refills: 0 | Status: SHIPPED | OUTPATIENT
Start: 2018-06-26 | End: 2018-07-25 | Stop reason: SDUPTHER

## 2018-06-26 RX ORDER — OMEPRAZOLE 40 MG/1
CAPSULE, DELAYED RELEASE ORAL
Qty: 30 CAPSULE | Refills: 0 | Status: SHIPPED | OUTPATIENT
Start: 2018-06-26 | End: 2018-07-25 | Stop reason: SDUPTHER

## 2018-06-26 RX ORDER — TRAZODONE HYDROCHLORIDE 100 MG/1
TABLET ORAL
Qty: 60 TABLET | Refills: 0 | Status: SHIPPED | OUTPATIENT
Start: 2018-06-26 | End: 2018-07-25 | Stop reason: SDUPTHER

## 2018-07-02 ENCOUNTER — TELEPHONE (OUTPATIENT)
Dept: CARDIOLOGY CLINIC | Age: 53
End: 2018-07-02

## 2018-07-02 DIAGNOSIS — I10 ESSENTIAL HYPERTENSION: Primary | ICD-10-CM

## 2018-07-02 DIAGNOSIS — T14.8XXA BRUISING: ICD-10-CM

## 2018-07-03 NOTE — TELEPHONE ENCOUNTER
Per Dr. Caity Elizalde  he would like for her to have her CBC and LFT's checked and consider taking coumadin. Pt Informed she will have labs checked and will research coumadin before taking it. Labs placed. Orders placed.

## 2018-07-05 DIAGNOSIS — I10 ESSENTIAL HYPERTENSION: ICD-10-CM

## 2018-07-05 DIAGNOSIS — T14.8XXA BRUISING: ICD-10-CM

## 2018-07-05 LAB
ALBUMIN SERPL-MCNC: 4.5 G/DL (ref 3.4–5)
ALP BLD-CCNC: 74 U/L (ref 40–129)
ALT SERPL-CCNC: 23 U/L (ref 10–40)
AST SERPL-CCNC: 22 U/L (ref 15–37)
BILIRUB SERPL-MCNC: 0.4 MG/DL (ref 0–1)
BILIRUBIN DIRECT: <0.2 MG/DL (ref 0–0.3)
BILIRUBIN, INDIRECT: NORMAL MG/DL (ref 0–1)
HCT VFR BLD CALC: 40.3 % (ref 36–48)
HEMOGLOBIN: 14.4 G/DL (ref 12–16)
MCH RBC QN AUTO: 33.1 PG (ref 26–34)
MCHC RBC AUTO-ENTMCNC: 35.6 G/DL (ref 31–36)
MCV RBC AUTO: 93 FL (ref 80–100)
PDW BLD-RTO: 13.2 % (ref 12.4–15.4)
PLATELET # BLD: 256 K/UL (ref 135–450)
PMV BLD AUTO: 8 FL (ref 5–10.5)
RBC # BLD: 4.34 M/UL (ref 4–5.2)
TOTAL PROTEIN: 6.7 G/DL (ref 6.4–8.2)
WBC # BLD: 7.4 K/UL (ref 4–11)

## 2018-07-06 LAB
CHOLESTEROL, TOTAL: 211 MG/DL (ref 0–199)
HDLC SERPL-MCNC: 66 MG/DL (ref 40–60)
LDL CHOLESTEROL CALCULATED: 128 MG/DL
TRIGL SERPL-MCNC: 85 MG/DL (ref 0–150)
VLDLC SERPL CALC-MCNC: 17 MG/DL

## 2018-07-16 ENCOUNTER — TELEPHONE (OUTPATIENT)
Dept: CARDIOLOGY CLINIC | Age: 53
End: 2018-07-16

## 2018-07-24 ENCOUNTER — TELEPHONE (OUTPATIENT)
Dept: INTERNAL MEDICINE | Age: 53
End: 2018-07-24

## 2018-07-24 NOTE — TELEPHONE ENCOUNTER
Pt states she need a refill for AMBIEN) 10 MG tablet. Pt requesting an appt for the week of 07/30/18. Please call.

## 2018-07-25 RX ORDER — FLECAINIDE ACETATE 50 MG/1
TABLET ORAL
Qty: 60 TABLET | Refills: 1 | Status: SHIPPED | OUTPATIENT
Start: 2018-07-25 | End: 2018-07-27 | Stop reason: SDUPTHER

## 2018-07-25 RX ORDER — TRAZODONE HYDROCHLORIDE 100 MG/1
TABLET ORAL
Qty: 60 TABLET | Refills: 1 | Status: SHIPPED | OUTPATIENT
Start: 2018-07-25 | End: 2018-09-28 | Stop reason: SDUPTHER

## 2018-07-25 RX ORDER — OMEPRAZOLE 40 MG/1
CAPSULE, DELAYED RELEASE ORAL
Qty: 30 CAPSULE | Refills: 1 | Status: SHIPPED | OUTPATIENT
Start: 2018-07-25 | End: 2018-09-28 | Stop reason: SDUPTHER

## 2018-07-26 RX ORDER — TORSEMIDE 20 MG/1
TABLET ORAL
Qty: 30 TABLET | Refills: 0 | Status: SHIPPED | OUTPATIENT
Start: 2018-07-26 | End: 2018-07-27 | Stop reason: SDUPTHER

## 2018-07-26 RX ORDER — POTASSIUM CHLORIDE 750 MG/1
10 CAPSULE, EXTENDED RELEASE ORAL DAILY
Qty: 90 CAPSULE | Refills: 3 | Status: SHIPPED | OUTPATIENT
Start: 2018-07-26 | End: 2018-07-27 | Stop reason: SDUPTHER

## 2018-07-27 ENCOUNTER — OFFICE VISIT (OUTPATIENT)
Dept: INTERNAL MEDICINE | Age: 53
End: 2018-07-27

## 2018-07-27 ENCOUNTER — OFFICE VISIT (OUTPATIENT)
Dept: CARDIOLOGY CLINIC | Age: 53
End: 2018-07-27

## 2018-07-27 ENCOUNTER — TELEPHONE (OUTPATIENT)
Dept: CARDIOLOGY CLINIC | Age: 53
End: 2018-07-27

## 2018-07-27 VITALS
HEIGHT: 69 IN | SYSTOLIC BLOOD PRESSURE: 120 MMHG | HEART RATE: 77 BPM | DIASTOLIC BLOOD PRESSURE: 80 MMHG | BODY MASS INDEX: 29.18 KG/M2 | WEIGHT: 197 LBS | OXYGEN SATURATION: 97 %

## 2018-07-27 VITALS
SYSTOLIC BLOOD PRESSURE: 120 MMHG | WEIGHT: 197 LBS | BODY MASS INDEX: 29.09 KG/M2 | HEART RATE: 72 BPM | DIASTOLIC BLOOD PRESSURE: 70 MMHG

## 2018-07-27 DIAGNOSIS — I10 ESSENTIAL HYPERTENSION: ICD-10-CM

## 2018-07-27 DIAGNOSIS — F51.04 CHRONIC INSOMNIA: Primary | ICD-10-CM

## 2018-07-27 DIAGNOSIS — E66.3 OVERWEIGHT (BMI 25.0-29.9): ICD-10-CM

## 2018-07-27 DIAGNOSIS — R60.9 WATER RETENTION: ICD-10-CM

## 2018-07-27 DIAGNOSIS — I48.0 PAROXYSMAL ATRIAL FIBRILLATION (HCC): Primary | ICD-10-CM

## 2018-07-27 DIAGNOSIS — R94.31 ABNORMAL EKG: ICD-10-CM

## 2018-07-27 PROBLEM — J45.901 ASTHMATIC BRONCHITIS WITH ACUTE EXACERBATION: Status: RESOLVED | Noted: 2018-01-17 | Resolved: 2018-07-27

## 2018-07-27 PROCEDURE — 99214 OFFICE O/P EST MOD 30 MIN: CPT | Performed by: INTERNAL MEDICINE

## 2018-07-27 RX ORDER — FLECAINIDE ACETATE 50 MG/1
50 TABLET ORAL 2 TIMES DAILY
Qty: 180 TABLET | Refills: 1 | Status: SHIPPED | OUTPATIENT
Start: 2018-07-27 | End: 2019-02-21 | Stop reason: SDUPTHER

## 2018-07-27 RX ORDER — ZOLPIDEM TARTRATE 10 MG/1
10 TABLET ORAL NIGHTLY PRN
Qty: 30 TABLET | Refills: 2 | Status: SHIPPED | OUTPATIENT
Start: 2018-07-27 | End: 2018-11-05 | Stop reason: SDUPTHER

## 2018-07-27 RX ORDER — TORSEMIDE 20 MG/1
TABLET ORAL
Qty: 90 TABLET | Refills: 3 | Status: SHIPPED | OUTPATIENT
Start: 2018-07-27 | End: 2019-08-02 | Stop reason: SDUPTHER

## 2018-07-27 RX ORDER — SPIRONOLACTONE 25 MG/1
25 TABLET ORAL DAILY
Qty: 90 TABLET | Refills: 3 | Status: SHIPPED | OUTPATIENT
Start: 2018-07-27 | End: 2018-12-12 | Stop reason: CLARIF

## 2018-07-27 RX ORDER — APIXABAN 5 MG/1
TABLET, FILM COATED ORAL
Qty: 180 TABLET | Refills: 3 | Status: SHIPPED | OUTPATIENT
Start: 2018-07-27 | End: 2019-08-02 | Stop reason: SDUPTHER

## 2018-07-27 RX ORDER — ZOLPIDEM TARTRATE 10 MG/1
TABLET ORAL NIGHTLY PRN
COMMUNITY
End: 2018-07-27 | Stop reason: SDUPTHER

## 2018-07-27 RX ORDER — ZOLPIDEM TARTRATE 10 MG/1
TABLET ORAL NIGHTLY PRN
Status: CANCELLED | OUTPATIENT
Start: 2018-07-27

## 2018-07-27 RX ORDER — POTASSIUM CHLORIDE 750 MG/1
10 CAPSULE, EXTENDED RELEASE ORAL DAILY
Qty: 90 CAPSULE | Refills: 3 | Status: SHIPPED | OUTPATIENT
Start: 2018-07-27 | End: 2018-08-22 | Stop reason: DRUGHIGH

## 2018-07-27 ASSESSMENT — PATIENT HEALTH QUESTIONNAIRE - PHQ9
SUM OF ALL RESPONSES TO PHQ QUESTIONS 1-9: 0
1. LITTLE INTEREST OR PLEASURE IN DOING THINGS: 0
2. FEELING DOWN, DEPRESSED OR HOPELESS: 0
SUM OF ALL RESPONSES TO PHQ9 QUESTIONS 1 & 2: 0

## 2018-07-27 ASSESSMENT — ENCOUNTER SYMPTOMS
RESPIRATORY NEGATIVE: 1
CHOKING: 0
GASTROINTESTINAL NEGATIVE: 1
CHEST TIGHTNESS: 0
SHORTNESS OF BREATH: 0
COUGH: 0

## 2018-07-27 NOTE — PROGRESS NOTES
SUBJECTIVE:    Patient ID: Viktoriya Britt is an 47 y.o. female. HPI: Patient here today for the f/u of chronic problems -- see Problem List and associated comments. New issues or complaints include (also see Assessment for more details):  Patient here for follow-up for zolpidem. She does well with this, sleeps well and is very functional and asked tape. 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 She enjoys her job. She feels she is doing well overall including psychologically. However she does need zolpidem for sleep. She has failed multiple other medications in the past.  She is also having problem with water retention which is relieved with a diuretic. However this causes hypokalemia and when her potassium gets low she gets significant muscle cramping. Review of Systems   Constitutional: Negative for activity change, appetite change and unexpected weight change. Respiratory: Negative. Cardiovascular: Negative. Gastrointestinal: Negative. Musculoskeletal: Positive for myalgias (only when potassiumlow). Neurological: Negative. Psychiatric/Behavioral: Positive for sleep disturbance. Negative for decreased concentration, dysphoric mood and suicidal ideas. The patient is not nervous/anxious. OBJECTIVE:    /80 (Site: Right Arm, Position: Sitting, Cuff Size: Medium Adult)   Pulse 77   Ht 5' 9\" (1.753 m)   Wt 197 lb (89.4 kg)   SpO2 97%   BMI 29.09 kg/m²      Physical Exam   Constitutional: She is oriented to person, place, and time. She appears well-developed and well-nourished. No distress. Eyes: EOM are normal. No scleral icterus. Neck: No JVD present. Carotid bruit is not present. No thyromegaly present. Cardiovascular: Normal rate, regular rhythm and normal heart sounds. Exam reveals no gallop. No murmur heard. Pulmonary/Chest: Effort normal and breath sounds normal. No stridor.  No

## 2018-07-27 NOTE — PROGRESS NOTES
Subjective:      Patient ID: Lucy Berry is a 47 y.o. female. Fatigue   Pertinent negatives include no chest pain, coughing or fatigue. Here for follow up afib/htn. Doing well. Walking daily. No exertional.   No tachycardia. No pnd or orthopnea. Past Medical History:   Diagnosis Date    Alcohol abuse, in remission     Anxiety state, unspecified     Depressive disorder, not elsewhere classified     Diffuse cystic mastopathy     Extrinsic asthma, unspecified     Hypertension     Leiomyoma of uterus, unspecified     Other and unspecified ovarian cyst      Past Surgical History:   Procedure Laterality Date    CHOLECYSTECTOMY      HYSTERECTOMY       Social History     Social History    Marital status:      Spouse name: N/A    Number of children: N/A    Years of education: N/A     Occupational History    Not on file. Social History Main Topics    Smoking status: Never Smoker    Smokeless tobacco: Never Used    Alcohol use 0.6 - 1.2 oz/week     1 - 2 Glasses of wine per week      Comment: 2 drinks qd    Drug use: No    Sexual activity: Yes     Partners: Male     Other Topics Concern    Not on file     Social History Narrative    No narrative on file       FH reviewed, noncontributory. Vitals:    07/27/18 0942   BP: 120/70   Pulse: 72       Wt 198    Review of Systems   Constitutional: Negative for activity change, appetite change and fatigue. Respiratory: Negative for cough, choking, chest tightness and shortness of breath. Cardiovascular: Negative for chest pain, palpitations and leg swelling. Denies PND or orthopnea. No tachycardia or syncope. Neurological: Negative for dizziness, syncope and light-headedness. Psychiatric/Behavioral: Negative for agitation, behavioral problems and confusion. Other systems reviewed negative as done. Objective:   Physical Exam   Constitutional: She is oriented to person, place, and time.  She appears

## 2018-07-27 NOTE — ASSESSMENT & PLAN NOTE
Patient's weight has leveled off after significant weight loss in the past.  Discussed medications which would not be appropriate in this case. She may want to visit with the dietitian againerickson has seen a dietitian in the past due to her gestational diabetes.

## 2018-08-22 ENCOUNTER — TELEPHONE (OUTPATIENT)
Dept: INTERNAL MEDICINE | Age: 53
End: 2018-08-22

## 2018-08-22 RX ORDER — POTASSIUM CHLORIDE 20 MEQ/1
20 TABLET, EXTENDED RELEASE ORAL 2 TIMES DAILY
Qty: 60 TABLET | Refills: 1 | Status: SHIPPED | OUTPATIENT
Start: 2018-08-22 | End: 2018-10-26 | Stop reason: SDUPTHER

## 2018-08-22 NOTE — TELEPHONE ENCOUNTER
Pt states she is experiencing a reaction to one of the medications she is taking spironolactone (ALDACTONE) 25 MG tablet. Pt states her hands and legs are tingling and one of her toes are numbed.     Please advise

## 2018-08-27 ENCOUNTER — TELEPHONE (OUTPATIENT)
Dept: INTERNAL MEDICINE | Age: 53
End: 2018-08-27

## 2018-08-27 RX ORDER — PREDNISONE 10 MG/1
TABLET ORAL
Qty: 22 TABLET | Refills: 0 | Status: SHIPPED | OUTPATIENT
Start: 2018-08-27 | End: 2018-12-12 | Stop reason: CLARIF

## 2018-08-27 RX ORDER — DOXYCYCLINE HYCLATE 100 MG
TABLET ORAL
Qty: 20 TABLET | Refills: 0 | OUTPATIENT
Start: 2018-08-27

## 2018-08-27 RX ORDER — DOXYCYCLINE HYCLATE 100 MG/1
CAPSULE ORAL
Qty: 20 CAPSULE | Refills: 0 | Status: SHIPPED | OUTPATIENT
Start: 2018-08-27 | End: 2018-12-12 | Stop reason: CLARIF

## 2018-08-27 NOTE — TELEPHONE ENCOUNTER
Unless she had an open wound or touched it on some mucous membranes and she is probably safe. Otherwise there is nothing else to do. The likelihood of transmission in this manner of any disease is very low in doing any blood work for testing at this point would be fruitless.

## 2018-08-27 NOTE — TELEPHONE ENCOUNTER
Pt stated that she went to the restroom today and there was blood from another person on the seat. She sat in it and noticed it when she was wiping. Pt wants to know if there is something she should do?

## 2018-09-24 ENCOUNTER — HOSPITAL ENCOUNTER (OUTPATIENT)
Dept: MAMMOGRAPHY | Age: 53
Discharge: HOME | End: 2018-09-24
Payer: COMMERCIAL

## 2018-09-24 DIAGNOSIS — Z12.31 VISIT FOR SCREENING MAMMOGRAM: ICD-10-CM

## 2018-09-24 PROCEDURE — 77063 BREAST TOMOSYNTHESIS BI: CPT

## 2018-09-28 RX ORDER — OMEPRAZOLE 40 MG/1
CAPSULE, DELAYED RELEASE ORAL
Qty: 30 CAPSULE | Refills: 2 | Status: SHIPPED | OUTPATIENT
Start: 2018-09-28 | End: 2018-12-27 | Stop reason: SDUPTHER

## 2018-09-28 RX ORDER — TRAZODONE HYDROCHLORIDE 100 MG/1
TABLET ORAL
Qty: 60 TABLET | Refills: 2 | Status: SHIPPED | OUTPATIENT
Start: 2018-09-28 | End: 2018-12-27 | Stop reason: SDUPTHER

## 2018-10-26 RX ORDER — POTASSIUM CHLORIDE 20 MEQ/1
TABLET, EXTENDED RELEASE ORAL
Qty: 60 TABLET | Refills: 0 | Status: SHIPPED | OUTPATIENT
Start: 2018-10-26 | End: 2018-11-30 | Stop reason: SDUPTHER

## 2018-10-31 ENCOUNTER — OFFICE VISIT (OUTPATIENT)
Dept: CARDIOLOGY CLINIC | Age: 53
End: 2018-10-31
Payer: COMMERCIAL

## 2018-10-31 VITALS
SYSTOLIC BLOOD PRESSURE: 130 MMHG | HEART RATE: 60 BPM | BODY MASS INDEX: 27.91 KG/M2 | DIASTOLIC BLOOD PRESSURE: 70 MMHG | WEIGHT: 189 LBS

## 2018-10-31 DIAGNOSIS — R94.31 ABNORMAL EKG: ICD-10-CM

## 2018-10-31 DIAGNOSIS — I10 ESSENTIAL HYPERTENSION: ICD-10-CM

## 2018-10-31 DIAGNOSIS — I48.0 PAROXYSMAL ATRIAL FIBRILLATION (HCC): Primary | ICD-10-CM

## 2018-10-31 PROCEDURE — 99214 OFFICE O/P EST MOD 30 MIN: CPT | Performed by: INTERNAL MEDICINE

## 2018-10-31 ASSESSMENT — ENCOUNTER SYMPTOMS
SHORTNESS OF BREATH: 0
CHEST TIGHTNESS: 0
COUGH: 0
CHOKING: 0

## 2018-11-05 ENCOUNTER — TELEPHONE (OUTPATIENT)
Dept: INTERNAL MEDICINE CLINIC | Age: 53
End: 2018-11-05

## 2018-11-05 DIAGNOSIS — F51.04 CHRONIC INSOMNIA: ICD-10-CM

## 2018-11-05 RX ORDER — ZOLPIDEM TARTRATE 10 MG/1
10 TABLET ORAL NIGHTLY PRN
Qty: 30 TABLET | Refills: 2 | OUTPATIENT
Start: 2018-11-05 | End: 2018-12-18 | Stop reason: SDUPTHER

## 2018-11-05 NOTE — TELEPHONE ENCOUNTER
zolpidem (AMBIEN) 10 MG tablet    [facility] [address] [address], [address] - P [phone] - F [phone]    Pt stated she hasn't slept in 4 days and she stated she goes thru this every time its time to refill this medication and she would like to speak to someone regarding its matter. .. pls advise

## 2018-11-30 RX ORDER — POTASSIUM CHLORIDE 20 MEQ/1
TABLET, EXTENDED RELEASE ORAL
Qty: 60 TABLET | Refills: 0 | Status: SHIPPED | OUTPATIENT
Start: 2018-11-30 | End: 2019-02-21 | Stop reason: SDUPTHER

## 2018-12-12 ENCOUNTER — OFFICE VISIT (OUTPATIENT)
Dept: INTERNAL MEDICINE CLINIC | Age: 53
End: 2018-12-12
Payer: COMMERCIAL

## 2018-12-12 VITALS
HEIGHT: 69 IN | BODY MASS INDEX: 27.7 KG/M2 | DIASTOLIC BLOOD PRESSURE: 82 MMHG | WEIGHT: 187 LBS | SYSTOLIC BLOOD PRESSURE: 120 MMHG

## 2018-12-12 DIAGNOSIS — R20.0 NUMBNESS OF TOES: ICD-10-CM

## 2018-12-12 DIAGNOSIS — G47.33 OBSTRUCTIVE SLEEP APNEA SYNDROME: ICD-10-CM

## 2018-12-12 DIAGNOSIS — F43.22 ADJUSTMENT DISORDER WITH ANXIETY: ICD-10-CM

## 2018-12-12 DIAGNOSIS — R19.5 DARK STOOLS: ICD-10-CM

## 2018-12-12 DIAGNOSIS — K21.9 GASTROESOPHAGEAL REFLUX DISEASE, ESOPHAGITIS PRESENCE NOT SPECIFIED: ICD-10-CM

## 2018-12-12 PROCEDURE — 99214 OFFICE O/P EST MOD 30 MIN: CPT | Performed by: INTERNAL MEDICINE

## 2018-12-12 RX ORDER — RANITIDINE 300 MG/1
300 TABLET ORAL NIGHTLY
Qty: 30 TABLET | Refills: 3 | Status: SHIPPED | OUTPATIENT
Start: 2018-12-12 | End: 2019-04-23 | Stop reason: SDUPTHER

## 2018-12-12 ASSESSMENT — ENCOUNTER SYMPTOMS
BLOOD IN STOOL: 0
ABDOMINAL PAIN: 0
APNEA: 1

## 2018-12-18 ENCOUNTER — TELEPHONE (OUTPATIENT)
Dept: INTERNAL MEDICINE CLINIC | Age: 53
End: 2018-12-18

## 2018-12-18 DIAGNOSIS — F51.04 CHRONIC INSOMNIA: ICD-10-CM

## 2018-12-18 RX ORDER — ZOLPIDEM TARTRATE 10 MG/1
10 TABLET ORAL NIGHTLY PRN
Qty: 30 TABLET | Refills: 1 | OUTPATIENT
Start: 2018-12-18 | End: 2019-02-21 | Stop reason: SDUPTHER

## 2018-12-18 NOTE — TELEPHONE ENCOUNTER
[facility]  Last filled 11/30/18      Lm for pt explaining just had script filled on 11/30 and it had 1 refill therefore not due until 1/30.

## 2019-02-06 ENCOUNTER — OFFICE VISIT (OUTPATIENT)
Dept: CARDIOLOGY CLINIC | Age: 54
End: 2019-02-06
Payer: MEDICARE

## 2019-02-06 VITALS
DIASTOLIC BLOOD PRESSURE: 80 MMHG | WEIGHT: 188 LBS | BODY MASS INDEX: 27.76 KG/M2 | HEART RATE: 70 BPM | SYSTOLIC BLOOD PRESSURE: 124 MMHG

## 2019-02-06 DIAGNOSIS — I48.0 PAROXYSMAL ATRIAL FIBRILLATION (HCC): Primary | ICD-10-CM

## 2019-02-06 DIAGNOSIS — I10 ESSENTIAL HYPERTENSION: ICD-10-CM

## 2019-02-06 DIAGNOSIS — R94.31 ABNORMAL EKG: ICD-10-CM

## 2019-02-06 PROCEDURE — 99214 OFFICE O/P EST MOD 30 MIN: CPT | Performed by: INTERNAL MEDICINE

## 2019-02-06 ASSESSMENT — ENCOUNTER SYMPTOMS
SHORTNESS OF BREATH: 0
COUGH: 0
CHEST TIGHTNESS: 0
CHOKING: 0

## 2019-02-12 RX ORDER — TRAZODONE HYDROCHLORIDE 100 MG/1
TABLET ORAL
Qty: 60 TABLET | Refills: 1 | OUTPATIENT
Start: 2019-02-12 | End: 2019-05-02 | Stop reason: SDUPTHER

## 2019-02-21 ENCOUNTER — TELEPHONE (OUTPATIENT)
Dept: INTERNAL MEDICINE CLINIC | Age: 54
End: 2019-02-21

## 2019-02-21 DIAGNOSIS — F51.04 CHRONIC INSOMNIA: ICD-10-CM

## 2019-02-21 RX ORDER — TOBRAMYCIN AND DEXAMETHASONE 3; 1 MG/ML; MG/ML
SUSPENSION/ DROPS OPHTHALMIC
Qty: 5 ML | Refills: 0 | Status: SHIPPED | OUTPATIENT
Start: 2019-02-21 | End: 2019-04-23

## 2019-02-21 RX ORDER — ZOLPIDEM TARTRATE 10 MG/1
10 TABLET ORAL NIGHTLY PRN
Qty: 30 TABLET | Refills: 0 | OUTPATIENT
Start: 2019-02-21 | End: 2019-03-07 | Stop reason: SDUPTHER

## 2019-02-25 RX ORDER — FLECAINIDE ACETATE 50 MG/1
50 TABLET ORAL 2 TIMES DAILY
Qty: 180 TABLET | Refills: 3 | Status: SHIPPED | OUTPATIENT
Start: 2019-02-25 | End: 2020-03-16

## 2019-03-07 ENCOUNTER — TELEPHONE (OUTPATIENT)
Dept: INTERNAL MEDICINE CLINIC | Age: 54
End: 2019-03-07

## 2019-03-07 DIAGNOSIS — F51.04 CHRONIC INSOMNIA: ICD-10-CM

## 2019-03-08 RX ORDER — OMEPRAZOLE 40 MG/1
CAPSULE, DELAYED RELEASE ORAL
Qty: 30 CAPSULE | Refills: 2 | Status: SHIPPED | OUTPATIENT
Start: 2019-03-08 | End: 2019-06-21 | Stop reason: SDUPTHER

## 2019-03-08 RX ORDER — ZOLPIDEM TARTRATE 10 MG/1
10 TABLET ORAL NIGHTLY PRN
Qty: 30 TABLET | Refills: 0 | OUTPATIENT
Start: 2019-03-08 | End: 2019-04-18 | Stop reason: SDUPTHER

## 2019-03-29 ENCOUNTER — TELEPHONE (OUTPATIENT)
Dept: CARDIOLOGY CLINIC | Age: 54
End: 2019-03-29

## 2019-04-02 RX ORDER — POTASSIUM CHLORIDE 20 MEQ/1
TABLET, EXTENDED RELEASE ORAL
Qty: 60 TABLET | Refills: 0 | Status: SHIPPED | OUTPATIENT
Start: 2019-04-02 | End: 2019-05-02 | Stop reason: SDUPTHER

## 2019-04-18 ENCOUNTER — TELEPHONE (OUTPATIENT)
Dept: INTERNAL MEDICINE CLINIC | Age: 54
End: 2019-04-18

## 2019-04-18 DIAGNOSIS — F51.04 CHRONIC INSOMNIA: ICD-10-CM

## 2019-04-19 RX ORDER — ZOLPIDEM TARTRATE 10 MG/1
10 TABLET ORAL NIGHTLY PRN
Qty: 30 TABLET | Refills: 0 | OUTPATIENT
Start: 2019-04-19 | End: 2019-04-23 | Stop reason: SDUPTHER

## 2019-04-23 ENCOUNTER — OFFICE VISIT (OUTPATIENT)
Dept: INTERNAL MEDICINE CLINIC | Age: 54
End: 2019-04-23
Payer: MEDICARE

## 2019-04-23 VITALS
SYSTOLIC BLOOD PRESSURE: 118 MMHG | OXYGEN SATURATION: 98 % | DIASTOLIC BLOOD PRESSURE: 80 MMHG | BODY MASS INDEX: 28.73 KG/M2 | HEIGHT: 69 IN | WEIGHT: 194 LBS | HEART RATE: 74 BPM

## 2019-04-23 DIAGNOSIS — F10.10 ALCOHOL ABUSE: ICD-10-CM

## 2019-04-23 DIAGNOSIS — F51.04 CHRONIC INSOMNIA: ICD-10-CM

## 2019-04-23 DIAGNOSIS — F43.22 ADJUSTMENT DISORDER WITH ANXIETY: ICD-10-CM

## 2019-04-23 DIAGNOSIS — G47.33 OBSTRUCTIVE SLEEP APNEA SYNDROME: Primary | ICD-10-CM

## 2019-04-23 DIAGNOSIS — I10 ESSENTIAL HYPERTENSION: ICD-10-CM

## 2019-04-23 PROCEDURE — G8427 DOCREV CUR MEDS BY ELIG CLIN: HCPCS | Performed by: INTERNAL MEDICINE

## 2019-04-23 PROCEDURE — G8419 CALC BMI OUT NRM PARAM NOF/U: HCPCS | Performed by: INTERNAL MEDICINE

## 2019-04-23 PROCEDURE — 99214 OFFICE O/P EST MOD 30 MIN: CPT | Performed by: INTERNAL MEDICINE

## 2019-04-23 PROCEDURE — 1036F TOBACCO NON-USER: CPT | Performed by: INTERNAL MEDICINE

## 2019-04-23 PROCEDURE — 3017F COLORECTAL CA SCREEN DOC REV: CPT | Performed by: INTERNAL MEDICINE

## 2019-04-23 RX ORDER — ZOLPIDEM TARTRATE 10 MG/1
10 TABLET ORAL NIGHTLY PRN
Qty: 30 TABLET | Refills: 2 | Status: SHIPPED | OUTPATIENT
Start: 2019-04-23 | End: 2019-08-07 | Stop reason: SDUPTHER

## 2019-04-23 RX ORDER — RANITIDINE 300 MG/1
300 TABLET ORAL NIGHTLY
Qty: 30 TABLET | Refills: 5 | Status: SHIPPED | OUTPATIENT
Start: 2019-04-23 | End: 2020-02-12

## 2019-04-23 ASSESSMENT — ENCOUNTER SYMPTOMS
APNEA: 1
SHORTNESS OF BREATH: 0

## 2019-04-23 ASSESSMENT — PATIENT HEALTH QUESTIONNAIRE - PHQ9
1. LITTLE INTEREST OR PLEASURE IN DOING THINGS: 0
SUM OF ALL RESPONSES TO PHQ QUESTIONS 1-9: 0
SUM OF ALL RESPONSES TO PHQ9 QUESTIONS 1 & 2: 0
2. FEELING DOWN, DEPRESSED OR HOPELESS: 0
SUM OF ALL RESPONSES TO PHQ QUESTIONS 1-9: 0

## 2019-04-23 NOTE — ASSESSMENT & PLAN NOTE
Chronic fatigue and sleepiness. Poor sleep quality. Significant snoring. Her new  has apnea and wears a CPAP mask. Gave referral for sleep study.

## 2019-04-23 NOTE — PROGRESS NOTES
SUBJECTIVE:  Patient ID: Kamron Chawla is an 46 y.o. female. HPI: Patient here today for the f/u of chronic problems-- see Problem List and associated comments. New issues or complaints include (alsosee Assessment for more details):  Patient here to get a refill on her zolpidem. She has tried and failed other medications. She cannot sleep without this medication. Additionally patient has seen a psychologist in undergoing evaluation for possible ADHD. Additionally she seems to have significant snoring and probable apnea. She never did get a sleep study. She is newly remarried and her current  uses CPAP. She does have a lot of anxiety but she denies any depression symptoms. She has a lot of stress as well-she has been unemployed for several months, newly , and she and her  her surgery for a new house. Review of Systems   Constitutional: Positive for fatigue. Negative for activity change and appetite change. Respiratory: Positive for apnea. Negative for shortness of breath. Cardiovascular: Negative for chest pain. Neurological: Negative for tremors and headaches. Psychiatric/Behavioral: Positive for decreased concentration and sleep disturbance. Negative for behavioral problems, dysphoric mood and suicidal ideas. The patient is nervous/anxious. OBJECTIVE:    /80 (Site: Left Upper Arm, Position: Sitting, Cuff Size: Large Adult)   Pulse 74   Ht 5' 9\" (1.753 m)   Wt 194 lb (88 kg)   SpO2 98%   BMI 28.65 kg/m²      Physical Exam   Constitutional: She is oriented to person, place, and time. She appears well-developed and well-nourished. No distress. Overweight   Eyes: EOM are normal. No scleral icterus. Cardiovascular: Normal rate and regular rhythm. Pulmonary/Chest: Effort normal. No respiratory distress. Neurological: She is alert and oriented to person, place, and time. She displays no tremor. No cranial nerve deficit.    No focal signs   Skin: She is not diaphoretic. No pallor. Psychiatric: She has a normal mood and affect. Her speech is normal and behavior is normal. Judgment and thought content normal. Cognition and memory are normal.       ASSESSMENT:       Encounter Diagnoses   Name Primary?  Obstructive sleep apnea syndrome Yes    Chronic insomnia     Essential hypertension     Adjustment disorder with anxiety     Alcohol abuse        Obstructive sleep apnea syndrome  Chronic fatigue and sleepiness. Poor sleep quality. Significant snoring. Her new  has apnea and wears a CPAP mask. Gave referral for sleep study. Essential hypertension  BP okay    Adjustment disorder with anxiety  Patient is currently seeing a psychologist.  She may soon see a psychiatrist as well. She may have ADHD and is undergoing testing and evaluation. Should she be placed on medication that would have to reevaluate the use of sedatives. Chronic insomnia  Monitor report done. Refill zolpidem. Patient also to undergo evaluation for sleep apnea. Without the use of zolpidem she is unable to sleep at all. Have discussed reviewed use, overuse. She has failed multiple other sleep medications over the years. Alcohol abuse  No current use        PLAN:See ASSESSMENT for evaluation & PLAN     Orders Placed This Encounter   Procedures    Baseline Diagnostic Sleep Study     Standing Status:   Future     Standing Expiration Date:   4/22/2020     Order Specific Question:   Adult or Pediatric     Answer:   Adult Study (>7 Years)       PSH, PMH, SH and FH reviewed and noted. Recent and past labs, tests and consultsalso reviewed. Recent or new meds also reviewed.

## 2019-04-23 NOTE — ASSESSMENT & PLAN NOTE
Patient is currently seeing a psychologist.  She may soon see a psychiatrist as well. She may have ADHD and is undergoing testing and evaluation. Should she be placed on medication that would have to reevaluate the use of sedatives.

## 2019-05-02 RX ORDER — TRAZODONE HYDROCHLORIDE 100 MG/1
TABLET ORAL
Qty: 60 TABLET | Refills: 1 | Status: SHIPPED | OUTPATIENT
Start: 2019-05-02 | End: 2019-06-21 | Stop reason: SDUPTHER

## 2019-05-02 RX ORDER — POTASSIUM CHLORIDE 20 MEQ/1
TABLET, EXTENDED RELEASE ORAL
Qty: 60 TABLET | Refills: 5 | Status: SHIPPED | OUTPATIENT
Start: 2019-05-02 | End: 2019-12-26

## 2019-05-14 ENCOUNTER — OFFICE VISIT (OUTPATIENT)
Dept: CARDIOLOGY CLINIC | Age: 54
End: 2019-05-14
Payer: OTHER GOVERNMENT

## 2019-05-14 VITALS
SYSTOLIC BLOOD PRESSURE: 114 MMHG | BODY MASS INDEX: 28.8 KG/M2 | WEIGHT: 195 LBS | HEART RATE: 68 BPM | DIASTOLIC BLOOD PRESSURE: 80 MMHG

## 2019-05-14 DIAGNOSIS — I48.0 PAROXYSMAL ATRIAL FIBRILLATION (HCC): Primary | ICD-10-CM

## 2019-05-14 DIAGNOSIS — R94.31 ABNORMAL EKG: ICD-10-CM

## 2019-05-14 DIAGNOSIS — I10 ESSENTIAL HYPERTENSION: ICD-10-CM

## 2019-05-14 PROCEDURE — 3017F COLORECTAL CA SCREEN DOC REV: CPT | Performed by: INTERNAL MEDICINE

## 2019-05-14 PROCEDURE — G8427 DOCREV CUR MEDS BY ELIG CLIN: HCPCS | Performed by: INTERNAL MEDICINE

## 2019-05-14 PROCEDURE — 1036F TOBACCO NON-USER: CPT | Performed by: INTERNAL MEDICINE

## 2019-05-14 PROCEDURE — G8419 CALC BMI OUT NRM PARAM NOF/U: HCPCS | Performed by: INTERNAL MEDICINE

## 2019-05-14 PROCEDURE — 99214 OFFICE O/P EST MOD 30 MIN: CPT | Performed by: INTERNAL MEDICINE

## 2019-05-14 ASSESSMENT — ENCOUNTER SYMPTOMS
COUGH: 0
CHEST TIGHTNESS: 0
SHORTNESS OF BREATH: 0
CHOKING: 0

## 2019-05-14 NOTE — PROGRESS NOTES
Subjective:      Patient ID: Chiqui Harrison is a 46 y.o. female. Fatigue   Pertinent negatives include no chest pain, coughing or fatigue. Here for follow up afib/htn. CV stable. Rhythm stable. Not exercise. No exertional.   No tachycardia. No sycope. No pnd or orthopnea. No edema. Past Medical History:   Diagnosis Date    Alcohol abuse, in remission     Anxiety state, unspecified     Depressive disorder, not elsewhere classified     Diffuse cystic mastopathy     Extrinsic asthma, unspecified     Hypertension     Leiomyoma of uterus, unspecified     Other and unspecified ovarian cyst      Past Surgical History:   Procedure Laterality Date    CHOLECYSTECTOMY      HYSTERECTOMY       Social History     Socioeconomic History    Marital status:      Spouse name: Not on file    Number of children: Not on file    Years of education: Not on file    Highest education level: Not on file   Occupational History    Not on file   Social Needs    Financial resource strain: Not on file    Food insecurity:     Worry: Not on file     Inability: Not on file    Transportation needs:     Medical: Not on file     Non-medical: Not on file   Tobacco Use    Smoking status: Never Smoker    Smokeless tobacco: Never Used   Substance and Sexual Activity    Alcohol use:  Yes     Alcohol/week: 0.6 - 1.2 oz     Types: 1 - 2 Glasses of wine per week     Comment: 2 drinks qd    Drug use: No    Sexual activity: Yes     Partners: Male   Lifestyle    Physical activity:     Days per week: Not on file     Minutes per session: Not on file    Stress: Not on file   Relationships    Social connections:     Talks on phone: Not on file     Gets together: Not on file     Attends [employer] service: Not on file     Active member of club or organization: Not on file     Attends meetings of clubs or organizations: Not on file     Relationship status: Not on file    Intimate partner violence:     Fear of current or ex partner: Not on file     Emotionally abused: Not on file     Physically abused: Not on file     Forced sexual activity: Not on file   Other Topics Concern    Not on file   Social History Narrative    Not on file       FH reviewed, noncontributory. Vitals:    05/14/19 1500   BP: 114/80   Pulse: 68       Wt 195    Review of Systems   Constitutional: Negative for activity change, appetite change and fatigue. Respiratory: Negative for cough, choking, chest tightness and shortness of breath. Cardiovascular: Negative for chest pain, palpitations and leg swelling. Denies PND or orthopnea. No tachycardia or syncope. Neurological: Negative for dizziness, syncope and light-headedness. Psychiatric/Behavioral: Negative for agitation, behavioral problems and confusion. Other systems reviewed negative as done. Objective:   Physical Exam   Constitutional: She is oriented to person, place, and time. She appears well-developed and well-nourished. No distress. HENT:   Head: Normocephalic and atraumatic. Eyes: Conjunctivae and EOM are normal. Right eye exhibits no discharge. Left eye exhibits no discharge. Neck: Normal range of motion. No JVD present. Cardiovascular: Normal rate, regular rhythm, S1 normal, S2 normal and normal heart sounds. Exam reveals no gallop. No murmur heard. Pulses:       Radial pulses are 2+ on the right side, and 2+ on the left side. Pulmonary/Chest: Effort normal and breath sounds normal. No respiratory distress. She has no wheezes. She has no rales. Abdominal: Soft. Bowel sounds are normal. There is no tenderness. Musculoskeletal: Normal range of motion. She exhibits no edema or tenderness. Neurological: She is alert and oriented to person, place, and time. Skin: Skin is warm and dry. Psychiatric: She has a normal mood and affect. Her behavior is normal. Thought content normal.       Assessment:       Diagnosis Orders   1.  Paroxysmal atrial fibrillation ([facility] [address]     2. Essential hypertension     3. Abnormal EKG              Plan:      CV stable. Rhythm  stable. On eliquis. No bleeding issues. No exertional sx. Reviewed outside records 10/17. (Head CT negative. CTPA negative for PE. Echo showing normal LV function mild TR with RVSP 36. Myoview non dx pharm but negative for ischemia by scan.) Continue AC. No bleeding issues. Follow up 3 months. Ayde Devries

## 2019-06-19 ENCOUNTER — OFFICE VISIT (OUTPATIENT)
Dept: INTERNAL MEDICINE CLINIC | Age: 54
End: 2019-06-19
Payer: OTHER GOVERNMENT

## 2019-06-19 VITALS
OXYGEN SATURATION: 97 % | DIASTOLIC BLOOD PRESSURE: 72 MMHG | WEIGHT: 195 LBS | SYSTOLIC BLOOD PRESSURE: 108 MMHG | HEART RATE: 79 BPM | BODY MASS INDEX: 28.8 KG/M2

## 2019-06-19 DIAGNOSIS — F51.04 CHRONIC INSOMNIA: ICD-10-CM

## 2019-06-19 DIAGNOSIS — K64.8 OTHER HEMORRHOIDS: ICD-10-CM

## 2019-06-19 DIAGNOSIS — M71.22 POPLITEAL CYST, LEFT: ICD-10-CM

## 2019-06-19 DIAGNOSIS — F90.2 ATTENTION DEFICIT HYPERACTIVITY DISORDER (ADHD), COMBINED TYPE: ICD-10-CM

## 2019-06-19 DIAGNOSIS — I10 ESSENTIAL HYPERTENSION: ICD-10-CM

## 2019-06-19 DIAGNOSIS — I48.0 PAROXYSMAL ATRIAL FIBRILLATION (HCC): ICD-10-CM

## 2019-06-19 PROBLEM — R60.9 WATER RETENTION: Status: RESOLVED | Noted: 2017-02-14 | Resolved: 2019-06-19

## 2019-06-19 PROBLEM — N95.1 MENOPAUSAL SYMPTOMS: Status: RESOLVED | Noted: 2017-06-28 | Resolved: 2019-06-19

## 2019-06-19 PROCEDURE — G8419 CALC BMI OUT NRM PARAM NOF/U: HCPCS | Performed by: INTERNAL MEDICINE

## 2019-06-19 PROCEDURE — 99214 OFFICE O/P EST MOD 30 MIN: CPT | Performed by: INTERNAL MEDICINE

## 2019-06-19 PROCEDURE — 3017F COLORECTAL CA SCREEN DOC REV: CPT | Performed by: INTERNAL MEDICINE

## 2019-06-19 PROCEDURE — 1036F TOBACCO NON-USER: CPT | Performed by: INTERNAL MEDICINE

## 2019-06-19 PROCEDURE — G8427 DOCREV CUR MEDS BY ELIG CLIN: HCPCS | Performed by: INTERNAL MEDICINE

## 2019-06-19 RX ORDER — DEXTROAMPHETAMINE SACCHARATE, AMPHETAMINE ASPARTATE MONOHYDRATE, DEXTROAMPHETAMINE SULFATE AND AMPHETAMINE SULFATE 2.5; 2.5; 2.5; 2.5 MG/1; MG/1; MG/1; MG/1
10 CAPSULE, EXTENDED RELEASE ORAL DAILY
Qty: 30 CAPSULE | Refills: 0 | Status: SHIPPED | OUTPATIENT
Start: 2019-06-19 | End: 2019-12-19

## 2019-06-19 ASSESSMENT — ENCOUNTER SYMPTOMS
RESPIRATORY NEGATIVE: 1
ANAL BLEEDING: 1

## 2019-06-19 NOTE — PROGRESS NOTES
SUBJECTIVE:  Patient ID: Moi Ambrose is an 46 y.o. female. HPI: Patient here today for the f/u of chronic problems-- see Problem List and associated comments. New issues or complaints include (alsosee Assessment for more details): Moderately severe bleeding hemorrhoids with pain over the last several days. Patient has never had her hemorrhoids bother her this much  in the past.  She has had colonoscopies in the past.  She also is referring to an letter from her therapist who did ADHD testing and found her to be markedly positive. Her letter is in our file. Patient discussed the use of Adderall, and she states she discussed this with her cardiologist as well. Lastly the patient has felt a fullness and some discomfort behind her left knee after doing a lot of heavy exertion. It is slowly improving. Review of Systems   Constitutional: Negative. Respiratory: Negative. Cardiovascular: Negative for chest pain and palpitations. Gastrointestinal: Positive for anal bleeding. Neurological: Negative. Psychiatric/Behavioral: Positive for decreased concentration and sleep disturbance. Negative for suicidal ideas. The patient is not nervous/anxious. OBJECTIVE:    /72   Pulse 79   Wt 195 lb (88.5 kg)   SpO2 97%   BMI 28.80 kg/m²      Physical Exam   Constitutional: She is oriented to person, place, and time. She appears well-developed and well-nourished. No distress. Cardiovascular: Normal rate and regular rhythm. Pulmonary/Chest: Effort normal.   Genitourinary:         Musculoskeletal:   Left popliteal fossa- small cyst, slightly tender   Neurological: She is alert and oriented to person, place, and time. She displays no tremor. No cranial nerve deficit. No focal signs   Skin: She is not diaphoretic. Psychiatric: She has a normal mood and affect.  Her speech is normal and behavior is normal. Judgment and thought content normal. Cognition and memory are normal. ASSESSMENT:       Encounter Diagnoses   Name Primary?  Attention deficit hyperactivity disorder (ADHD), combined type     Other hemorrhoids     Popliteal cyst, left     Essential hypertension     Chronic insomnia     Paroxysmal atrial fibrillation (HCC)        Other hemorrhoids  Significant bleeding and discomfort. [employer]-HC. Refer to rectal surgeon for opinion. Attention deficit hyperactivity disorder (ADHD), combined type  Status post evaluation by her therapist, Dr. Priscilla Harrington. She sent over her evaluation testing that was done May 2019. It was significantly positive for ADHD. Patient is already talked to her cardiologist about this. Gave Rx for Adderall XR 10 mg. Discussed use addiction potential,, benefits and risks. Particularly wrist related to her paroxysmal atrial fibrillation. Monitor report done. Rx given the patient. Follow-up 3 months. She continues to follow-up with her therapist monthly. Popliteal cyst, left  Exacerbated after heavy lifting. Recommend elastic sleeve in future. Essential hypertension  BP okay    Chronic insomnia  Monitor adverse effects due to Adderall    Paroxysmal atrial fibrillation (HCC)  Very rare symptoms. Monitor effects of Adderall. Patient has already discussed this with her cardiologist-per patient. PLAN:See ASSESSMENT for evaluation & PLAN     No orders of the defined types were placed in this encounter. PSH, PMH, SH and FH reviewed and noted. Recent and past labs, tests and consultsalso reviewed. Recent or new meds also reviewed.

## 2019-06-19 NOTE — ASSESSMENT & PLAN NOTE
Very rare symptoms. Monitor effects of Adderall. Patient has already discussed this with her cardiologist-per patient.

## 2019-07-22 RX ORDER — TRAZODONE HYDROCHLORIDE 100 MG/1
TABLET ORAL
Qty: 60 TABLET | Refills: 5 | Status: SHIPPED | OUTPATIENT
Start: 2019-07-22 | End: 2020-01-23

## 2019-07-22 RX ORDER — OMEPRAZOLE 40 MG/1
CAPSULE, DELAYED RELEASE ORAL
Qty: 30 CAPSULE | Refills: 5 | Status: SHIPPED | OUTPATIENT
Start: 2019-07-22 | End: 2020-01-23

## 2019-08-02 RX ORDER — APIXABAN 5 MG/1
TABLET, FILM COATED ORAL
Qty: 180 TABLET | Refills: 3 | Status: SHIPPED | OUTPATIENT
Start: 2019-08-02 | End: 2020-07-09

## 2019-08-07 ENCOUNTER — TELEPHONE (OUTPATIENT)
Dept: INTERNAL MEDICINE CLINIC | Age: 54
End: 2019-08-07

## 2019-08-07 DIAGNOSIS — F51.04 CHRONIC INSOMNIA: ICD-10-CM

## 2019-08-07 RX ORDER — ZOLPIDEM TARTRATE 10 MG/1
10 TABLET ORAL NIGHTLY PRN
Qty: 30 TABLET | Refills: 0 | OUTPATIENT
Start: 2019-08-07 | End: 2019-09-03 | Stop reason: SDUPTHER

## 2019-08-08 RX ORDER — ZOLPIDEM TARTRATE 10 MG/1
10 TABLET ORAL NIGHTLY PRN
Qty: 30 TABLET | Refills: 2 | Status: SHIPPED | OUTPATIENT
Start: 2019-08-08 | End: 2019-08-08

## 2019-09-03 DIAGNOSIS — F51.04 CHRONIC INSOMNIA: ICD-10-CM

## 2019-09-03 RX ORDER — ZOLPIDEM TARTRATE 10 MG/1
TABLET ORAL
Qty: 30 TABLET | Refills: 0 | Status: SHIPPED | OUTPATIENT
Start: 2019-09-03 | End: 2019-09-18 | Stop reason: SDUPTHER

## 2019-09-09 RX ORDER — FLUTICASONE FUROATE AND VILANTEROL 100; 25 UG/1; UG/1
1 POWDER RESPIRATORY (INHALATION) DAILY
Qty: 1 EACH | Refills: 5 | Status: SHIPPED | OUTPATIENT
Start: 2019-09-09 | End: 2020-06-22 | Stop reason: SDUPTHER

## 2019-09-12 ENCOUNTER — TELEPHONE (OUTPATIENT)
Dept: ORTHOPEDIC SURGERY | Age: 54
End: 2019-09-12

## 2019-09-18 ENCOUNTER — OFFICE VISIT (OUTPATIENT)
Dept: INTERNAL MEDICINE CLINIC | Age: 54
End: 2019-09-18
Payer: OTHER GOVERNMENT

## 2019-09-18 VITALS
OXYGEN SATURATION: 98 % | BODY MASS INDEX: 29.18 KG/M2 | HEIGHT: 69 IN | SYSTOLIC BLOOD PRESSURE: 110 MMHG | DIASTOLIC BLOOD PRESSURE: 82 MMHG | WEIGHT: 197 LBS | HEART RATE: 81 BPM

## 2019-09-18 DIAGNOSIS — F51.04 CHRONIC INSOMNIA: ICD-10-CM

## 2019-09-18 DIAGNOSIS — N95.1 MENOPAUSAL SYMPTOMS: ICD-10-CM

## 2019-09-18 DIAGNOSIS — F90.2 ATTENTION DEFICIT HYPERACTIVITY DISORDER (ADHD), COMBINED TYPE: ICD-10-CM

## 2019-09-18 DIAGNOSIS — K64.8 OTHER HEMORRHOIDS: ICD-10-CM

## 2019-09-18 DIAGNOSIS — G47.33 OBSTRUCTIVE SLEEP APNEA SYNDROME: ICD-10-CM

## 2019-09-18 DIAGNOSIS — J45.909 EXTRINSIC ASTHMA WITHOUT COMPLICATION, UNSPECIFIED ASTHMA SEVERITY, UNSPECIFIED WHETHER PERSISTENT: ICD-10-CM

## 2019-09-18 PROCEDURE — 99214 OFFICE O/P EST MOD 30 MIN: CPT | Performed by: INTERNAL MEDICINE

## 2019-09-18 RX ORDER — ZOLPIDEM TARTRATE 10 MG/1
10 TABLET ORAL NIGHTLY PRN
Qty: 30 TABLET | Refills: 2 | Status: SHIPPED | OUTPATIENT
Start: 2019-09-18 | End: 2019-12-19 | Stop reason: SDUPTHER

## 2019-09-18 ASSESSMENT — PATIENT HEALTH QUESTIONNAIRE - PHQ9
SUM OF ALL RESPONSES TO PHQ9 QUESTIONS 1 & 2: 0
2. FEELING DOWN, DEPRESSED OR HOPELESS: 0
1. LITTLE INTEREST OR PLEASURE IN DOING THINGS: 0
SUM OF ALL RESPONSES TO PHQ QUESTIONS 1-9: 0
SUM OF ALL RESPONSES TO PHQ QUESTIONS 1-9: 0

## 2019-09-18 ASSESSMENT — ENCOUNTER SYMPTOMS
RESPIRATORY NEGATIVE: 1
RECTAL PAIN: 1

## 2019-09-18 NOTE — PROGRESS NOTES
SUBJECTIVE:  Patient ID: Handy Charles is an 53 y.o. female. HPI: Patient here today for the f/u of chronic problems-- see Problem List and associated comments. New issues or complaints include (alsosee Assessment for more details): She still having significant sleep problems. There are multiple issues in play including her sleep disturbance issues, insomnia, probable sleep apnea, stress, and probable menopausal induced sleep symptoms. She did not try the medication for her ADHD as yet. She is worried that it could adversely affect her sleeping issues. Her  is using his CPAP and he is doing well. She feels like she is having apnea episodes and she knows she needs to get evaluated. Her hemorrhoids are still significant problem but not as severe. She is not yet ready for intervention. Review of Systems   Constitutional: Negative for activity change and appetite change. Respiratory: Negative. Cardiovascular: Negative. Gastrointestinal: Positive for rectal pain. Genitourinary: Negative. Neurological: Negative. Psychiatric/Behavioral: Positive for sleep disturbance. Negative for dysphoric mood. The patient is nervous/anxious. OBJECTIVE:    /82 (Site: Left Upper Arm, Position: Sitting, Cuff Size: Large Adult)   Pulse 81   Ht 5' 9\" (1.753 m)   Wt 197 lb (89.4 kg)   SpO2 98%   BMI 29.09 kg/m²      Physical Exam   Constitutional: She is oriented to person, place, and time. She appears well-developed and well-nourished. No distress. Overweight   Eyes: EOM are normal. No scleral icterus. Cardiovascular: Normal rate and regular rhythm. Pulmonary/Chest: Effort normal. No respiratory distress. Neurological: She is alert and oriented to person, place, and time. She displays no tremor. No cranial nerve deficit. No focal signs   Skin: She is not diaphoretic. No pallor. Psychiatric: She has a normal mood and affect.  Her speech is normal and behavior is normal. Judgment and thought content normal. Cognition and memory are normal.       ASSESSMENT:       Encounter Diagnoses   Name Primary?  Extrinsic asthma without complication, unspecified asthma severity, unspecified whether persistent     Chronic insomnia     Other hemorrhoids     Attention deficit hyperactivity disorder (ADHD), combined type     Obstructive sleep apnea syndrome     Menopausal symptoms        Other hemorrhoids  Refer to colorectal surgery when patient is ready    Attention deficit hyperactivity disorder (ADHD), combined type  Patient has not yet tried ADD medication-she has some fears of taking it especially with her sleep issues. I agree to hold off for now until she gets better handle on her sleep issues and possible sleep apnea. Obstructive sleep apnea syndrome  Hopefully she is working herself towards getting a sleep apnea study    Chronic insomnia  Probable apnea. Refill zolpidem. Monitor report done. Also gave a trial of Premarin to see if this helps with her sleep. Menopausal symptoms  Suspect mainly manifested as a sleep disturbance. Gave a trial of Premarin 0.45 mg for 3 months. Encouraged her to follow-up with her gynecologist, especially if the Premarin does help. She has not had an abnormal Pap smear in 35 years. She gets yearly mammogram.  She is status post hysterectomy. PLAN:See ASSESSMENT for evaluation & PLAN     No orders of the defined types were placed in this encounter. PSH, PMH, SH and FH reviewed and noted. Recent and past labs, tests and consultsalso reviewed. Recent or new meds also reviewed.

## 2019-09-25 ENCOUNTER — HOSPITAL ENCOUNTER (OUTPATIENT)
Dept: MAMMOGRAPHY | Age: 54
Discharge: HOME | End: 2019-09-25
Payer: OTHER GOVERNMENT

## 2019-09-25 DIAGNOSIS — Z12.31 BREAST CANCER SCREENING BY MAMMOGRAM: ICD-10-CM

## 2019-09-25 PROCEDURE — 77067 SCR MAMMO BI INCL CAD: CPT

## 2019-09-30 ENCOUNTER — TELEPHONE (OUTPATIENT)
Dept: ORTHOPEDIC SURGERY | Age: 54
End: 2019-09-30

## 2019-10-01 ENCOUNTER — OFFICE VISIT (OUTPATIENT)
Dept: CARDIOLOGY CLINIC | Age: 54
End: 2019-10-01
Payer: OTHER GOVERNMENT

## 2019-10-01 VITALS
HEART RATE: 68 BPM | DIASTOLIC BLOOD PRESSURE: 60 MMHG | BODY MASS INDEX: 29.24 KG/M2 | WEIGHT: 198 LBS | SYSTOLIC BLOOD PRESSURE: 100 MMHG

## 2019-10-01 DIAGNOSIS — R94.31 ABNORMAL EKG: ICD-10-CM

## 2019-10-01 DIAGNOSIS — I48.0 PAROXYSMAL ATRIAL FIBRILLATION (HCC): Primary | ICD-10-CM

## 2019-10-01 DIAGNOSIS — I10 ESSENTIAL HYPERTENSION: ICD-10-CM

## 2019-10-01 PROCEDURE — G8419 CALC BMI OUT NRM PARAM NOF/U: HCPCS | Performed by: INTERNAL MEDICINE

## 2019-10-01 PROCEDURE — G8484 FLU IMMUNIZE NO ADMIN: HCPCS | Performed by: INTERNAL MEDICINE

## 2019-10-01 PROCEDURE — 3017F COLORECTAL CA SCREEN DOC REV: CPT | Performed by: INTERNAL MEDICINE

## 2019-10-01 PROCEDURE — G8427 DOCREV CUR MEDS BY ELIG CLIN: HCPCS | Performed by: INTERNAL MEDICINE

## 2019-10-01 PROCEDURE — 1036F TOBACCO NON-USER: CPT | Performed by: INTERNAL MEDICINE

## 2019-10-01 PROCEDURE — 99214 OFFICE O/P EST MOD 30 MIN: CPT | Performed by: INTERNAL MEDICINE

## 2019-10-01 RX ORDER — DOXYCYCLINE HYCLATE 100 MG
TABLET ORAL
Refills: 1 | COMMUNITY
Start: 2019-09-27 | End: 2019-12-19

## 2019-10-01 RX ORDER — KETOTIFEN FUMARATE 0.35 MG/ML
SOLUTION/ DROPS OPHTHALMIC
Refills: 6 | COMMUNITY
Start: 2019-09-27 | End: 2022-09-13

## 2019-10-01 RX ORDER — PREDNISOLONE ACETATE 10 MG/ML
SUSPENSION/ DROPS OPHTHALMIC
Refills: 1 | COMMUNITY
Start: 2019-09-27 | End: 2021-02-04

## 2019-10-01 ASSESSMENT — ENCOUNTER SYMPTOMS
SHORTNESS OF BREATH: 0
COUGH: 0
CHEST TIGHTNESS: 0
CHOKING: 0

## 2019-12-19 ENCOUNTER — OFFICE VISIT (OUTPATIENT)
Dept: INTERNAL MEDICINE CLINIC | Age: 54
End: 2019-12-19
Payer: OTHER GOVERNMENT

## 2019-12-19 VITALS
OXYGEN SATURATION: 97 % | HEIGHT: 69 IN | BODY MASS INDEX: 30.51 KG/M2 | DIASTOLIC BLOOD PRESSURE: 78 MMHG | WEIGHT: 206 LBS | SYSTOLIC BLOOD PRESSURE: 110 MMHG | HEART RATE: 79 BPM

## 2019-12-19 DIAGNOSIS — F51.04 CHRONIC INSOMNIA: ICD-10-CM

## 2019-12-19 DIAGNOSIS — R10.9 ABDOMINAL PAIN, LEFT LATERAL: ICD-10-CM

## 2019-12-19 DIAGNOSIS — K64.8 OTHER HEMORRHOIDS: ICD-10-CM

## 2019-12-19 DIAGNOSIS — E66.3 OVERWEIGHT (BMI 25.0-29.9): ICD-10-CM

## 2019-12-19 DIAGNOSIS — N95.1 MENOPAUSAL SYMPTOMS: ICD-10-CM

## 2019-12-19 DIAGNOSIS — G47.33 OBSTRUCTIVE SLEEP APNEA SYNDROME: ICD-10-CM

## 2019-12-19 PROBLEM — F90.2 ATTENTION DEFICIT HYPERACTIVITY DISORDER (ADHD), COMBINED TYPE: Status: RESOLVED | Noted: 2019-06-19 | Resolved: 2019-12-19

## 2019-12-19 PROBLEM — R19.5 DARK STOOLS: Status: RESOLVED | Noted: 2018-12-12 | Resolved: 2019-12-19

## 2019-12-19 PROCEDURE — G8484 FLU IMMUNIZE NO ADMIN: HCPCS | Performed by: INTERNAL MEDICINE

## 2019-12-19 PROCEDURE — 1036F TOBACCO NON-USER: CPT | Performed by: INTERNAL MEDICINE

## 2019-12-19 PROCEDURE — 99214 OFFICE O/P EST MOD 30 MIN: CPT | Performed by: INTERNAL MEDICINE

## 2019-12-19 PROCEDURE — G8427 DOCREV CUR MEDS BY ELIG CLIN: HCPCS | Performed by: INTERNAL MEDICINE

## 2019-12-19 PROCEDURE — G8417 CALC BMI ABV UP PARAM F/U: HCPCS | Performed by: INTERNAL MEDICINE

## 2019-12-19 PROCEDURE — 3017F COLORECTAL CA SCREEN DOC REV: CPT | Performed by: INTERNAL MEDICINE

## 2019-12-19 RX ORDER — ZOLPIDEM TARTRATE 10 MG/1
10 TABLET ORAL NIGHTLY PRN
Qty: 30 TABLET | Refills: 2 | Status: SHIPPED | OUTPATIENT
Start: 2019-12-19 | End: 2020-03-09 | Stop reason: SDUPTHER

## 2019-12-19 ASSESSMENT — PATIENT HEALTH QUESTIONNAIRE - PHQ9
SUM OF ALL RESPONSES TO PHQ9 QUESTIONS 1 & 2: 0
2. FEELING DOWN, DEPRESSED OR HOPELESS: 0
SUM OF ALL RESPONSES TO PHQ QUESTIONS 1-9: 0
1. LITTLE INTEREST OR PLEASURE IN DOING THINGS: 0
SUM OF ALL RESPONSES TO PHQ QUESTIONS 1-9: 0

## 2019-12-19 ASSESSMENT — ENCOUNTER SYMPTOMS
RESPIRATORY NEGATIVE: 1
ABDOMINAL PAIN: 1

## 2019-12-26 RX ORDER — POTASSIUM CHLORIDE 20 MEQ/1
TABLET, EXTENDED RELEASE ORAL
Qty: 60 TABLET | Refills: 5 | Status: SHIPPED | OUTPATIENT
Start: 2019-12-26 | End: 2020-06-09

## 2019-12-30 ENCOUNTER — HOSPITAL ENCOUNTER (OUTPATIENT)
Dept: CT IMAGING | Age: 54
Discharge: HOME | End: 2019-12-30
Payer: OTHER GOVERNMENT

## 2019-12-30 DIAGNOSIS — R10.9 ABDOMINAL PAIN, LEFT LATERAL: ICD-10-CM

## 2019-12-30 PROCEDURE — 6360000004 HC RX CONTRAST MEDICATION: Performed by: INTERNAL MEDICINE

## 2019-12-30 PROCEDURE — 74176 CT ABD & PELVIS W/O CONTRAST: CPT

## 2019-12-30 RX ADMIN — IOHEXOL 50 ML: 240 INJECTION, SOLUTION INTRATHECAL; INTRAVASCULAR; INTRAVENOUS; ORAL at 15:15

## 2019-12-31 ENCOUNTER — TELEPHONE (OUTPATIENT)
Dept: INTERNAL MEDICINE CLINIC | Age: 54
End: 2019-12-31

## 2019-12-31 NOTE — TELEPHONE ENCOUNTER
Patient called and wanted her CT scan, I read her what was on there. She stated she did not have a urologist, can you please refer her to one?      Please call to advise

## 2020-01-07 ENCOUNTER — OFFICE VISIT (OUTPATIENT)
Dept: CARDIOLOGY CLINIC | Age: 55
End: 2020-01-07
Payer: OTHER GOVERNMENT

## 2020-01-07 VITALS
WEIGHT: 204 LBS | BODY MASS INDEX: 30.13 KG/M2 | SYSTOLIC BLOOD PRESSURE: 134 MMHG | DIASTOLIC BLOOD PRESSURE: 70 MMHG | HEART RATE: 68 BPM

## 2020-01-07 PROCEDURE — 1036F TOBACCO NON-USER: CPT | Performed by: INTERNAL MEDICINE

## 2020-01-07 PROCEDURE — G8417 CALC BMI ABV UP PARAM F/U: HCPCS | Performed by: INTERNAL MEDICINE

## 2020-01-07 PROCEDURE — G8484 FLU IMMUNIZE NO ADMIN: HCPCS | Performed by: INTERNAL MEDICINE

## 2020-01-07 PROCEDURE — 99214 OFFICE O/P EST MOD 30 MIN: CPT | Performed by: INTERNAL MEDICINE

## 2020-01-07 PROCEDURE — G8427 DOCREV CUR MEDS BY ELIG CLIN: HCPCS | Performed by: INTERNAL MEDICINE

## 2020-01-07 PROCEDURE — 3017F COLORECTAL CA SCREEN DOC REV: CPT | Performed by: INTERNAL MEDICINE

## 2020-01-07 ASSESSMENT — ENCOUNTER SYMPTOMS
SHORTNESS OF BREATH: 0
CHEST TIGHTNESS: 0
COUGH: 0
CHOKING: 0

## 2020-01-07 NOTE — PROGRESS NOTES
status: Not on file    Intimate partner violence:     Fear of current or ex partner: Not on file     Emotionally abused: Not on file     Physically abused: Not on file     Forced sexual activity: Not on file   Other Topics Concern    Not on file   Social History Narrative    Not on file       FH reviewed, denies FH cardiac issues. Vitals:    01/07/20 1530   BP: 134/70   Pulse: 68       Wt 204    Review of Systems   Constitutional: Negative for activity change, appetite change and fatigue. Respiratory: Negative for cough, choking, chest tightness and shortness of breath. Cardiovascular: Negative for chest pain, palpitations and leg swelling. Denies PND or orthopnea. No tachycardia or syncope. Neurological: Negative for dizziness, syncope and light-headedness. Psychiatric/Behavioral: Negative for agitation, behavioral problems and confusion. All other systems reviewed negative as done. Objective:   Physical Exam   Constitutional: She is oriented to person, place, and time. She appears well-developed and well-nourished. No distress. HENT:   Head: Normocephalic and atraumatic. Eyes: Conjunctivae and EOM are normal. Right eye exhibits no discharge. Left eye exhibits no discharge. Neck: Normal range of motion. No JVD present. Cardiovascular: Normal rate, regular rhythm, S1 normal, S2 normal and normal heart sounds. Exam reveals no gallop. No murmur heard. Pulses:       Radial pulses are 2+ on the right side and 2+ on the left side. Pulmonary/Chest: Effort normal and breath sounds normal. No respiratory distress. She has no wheezes. She has no rales. Abdominal: Soft. Bowel sounds are normal. There is no tenderness. Musculoskeletal: Normal range of motion. General: No tenderness or edema. Neurological: She is alert and oriented to person, place, and time. Skin: Skin is warm and dry. Psychiatric: She has a normal mood and affect.  Her behavior is normal. Thought

## 2020-01-15 ENCOUNTER — TELEPHONE (OUTPATIENT)
Dept: INTERNAL MEDICINE CLINIC | Age: 55
End: 2020-01-15

## 2020-01-15 ENCOUNTER — TELEPHONE (OUTPATIENT)
Dept: CARDIOLOGY CLINIC | Age: 55
End: 2020-01-15

## 2020-01-15 NOTE — TELEPHONE ENCOUNTER
Pt is admitted at [facility]    Not happy with the way they are treating her    Requesting an immediate call from our office to [facility]?    Ernie Contreras does not have privileges there.     Stated\" I don't care where he goes\" \" He is my Dr.\" I want an urgent call back\"

## 2020-01-16 ENCOUNTER — OFFICE VISIT (OUTPATIENT)
Dept: CARDIOLOGY CLINIC | Age: 55
End: 2020-01-16
Payer: OTHER GOVERNMENT

## 2020-01-16 VITALS
SYSTOLIC BLOOD PRESSURE: 118 MMHG | WEIGHT: 182 LBS | BODY MASS INDEX: 26.88 KG/M2 | DIASTOLIC BLOOD PRESSURE: 80 MMHG | HEART RATE: 68 BPM

## 2020-01-16 PROCEDURE — G8427 DOCREV CUR MEDS BY ELIG CLIN: HCPCS | Performed by: INTERNAL MEDICINE

## 2020-01-16 PROCEDURE — G8417 CALC BMI ABV UP PARAM F/U: HCPCS | Performed by: INTERNAL MEDICINE

## 2020-01-16 PROCEDURE — 99214 OFFICE O/P EST MOD 30 MIN: CPT | Performed by: INTERNAL MEDICINE

## 2020-01-16 PROCEDURE — G8484 FLU IMMUNIZE NO ADMIN: HCPCS | Performed by: INTERNAL MEDICINE

## 2020-01-16 PROCEDURE — 1036F TOBACCO NON-USER: CPT | Performed by: INTERNAL MEDICINE

## 2020-01-16 PROCEDURE — 3017F COLORECTAL CA SCREEN DOC REV: CPT | Performed by: INTERNAL MEDICINE

## 2020-01-16 RX ORDER — BUTALBITAL, ACETAMINOPHEN AND CAFFEINE 50; 325; 40 MG/1; MG/1; MG/1
1 TABLET ORAL
COMMUNITY
Start: 2020-01-15 | End: 2020-08-03 | Stop reason: SDUPTHER

## 2020-01-16 RX ORDER — OXYCODONE HYDROCHLORIDE 5 MG/1
5 CAPSULE ORAL
COMMUNITY
Start: 2020-01-15 | End: 2020-01-20

## 2020-01-16 ASSESSMENT — ENCOUNTER SYMPTOMS
CHEST TIGHTNESS: 0
SHORTNESS OF BREATH: 0
COUGH: 0
CHOKING: 0

## 2020-01-16 NOTE — PROGRESS NOTES
Subjective:      Patient ID: Julianne Rivers is a 53 y.o. female. Fatigue   Pertinent negatives include no chest pain, coughing or fatigue. Here for follow up afib/htn/abn ekg. Had ureteral stent placed. BP noted to be elevated. Noted with pain. Noted after contrast.    No tachycardia. No exertional.   No tachycardia. No sycope. No pnd or orthopnea. No edema. Past Medical History:   Diagnosis Date    Alcohol abuse, in remission     Anxiety state, unspecified     Depressive disorder, not elsewhere classified     Diffuse cystic mastopathy     Extrinsic asthma, unspecified     Hypertension     Leiomyoma of uterus, unspecified     Other and unspecified ovarian cyst      Past Surgical History:   Procedure Laterality Date    CHOLECYSTECTOMY      HYSTERECTOMY       Social History     Socioeconomic History    Marital status:      Spouse name: Not on file    Number of children: Not on file    Years of education: Not on file    Highest education level: Not on file   Occupational History    Not on file   Social Needs    Financial resource strain: Not on file    Food insecurity:     Worry: Not on file     Inability: Not on file    Transportation needs:     Medical: Not on file     Non-medical: Not on file   Tobacco Use    Smoking status: Never Smoker    Smokeless tobacco: Never Used   Substance and Sexual Activity    Alcohol use:  Yes     Alcohol/week: 1.0 - 2.0 standard drinks     Types: 1 - 2 Glasses of wine per week     Comment: 2 drinks qd    Drug use: No    Sexual activity: Yes     Partners: Male   Lifestyle    Physical activity:     Days per week: Not on file     Minutes per session: Not on file    Stress: Not on file   Relationships    Social connections:     Talks on phone: Not on file     Gets together: Not on file     Attends Restorationist service: Not on file     Active member of club or organization: Not on file     Attends meetings of clubs or organizations: Not on file     Relationship status: Not on file    Intimate partner violence:     Fear of current or ex partner: Not on file     Emotionally abused: Not on file     Physically abused: Not on file     Forced sexual activity: Not on file   Other Topics Concern    Not on file   Social History Narrative    Not on file       FH reviewed, denies FH cardiac issues. Vitals:    01/16/20 1006   BP: 118/80   Pulse: 68       Wt 184    Review of Systems   Constitutional: Negative for activity change, appetite change and fatigue. Respiratory: Negative for cough, choking, chest tightness and shortness of breath. Cardiovascular: Negative for chest pain, palpitations and leg swelling. Denies PND or orthopnea. No tachycardia or syncope. Neurological: Negative for dizziness, syncope and light-headedness. Psychiatric/Behavioral: Negative for agitation, behavioral problems and confusion. All other systems reviewed negative as done. Objective:   Physical Exam   Constitutional: She is oriented to person, place, and time. She appears well-developed and well-nourished. No distress. HENT:   Head: Normocephalic and atraumatic. Eyes: Conjunctivae and EOM are normal. Right eye exhibits no discharge. Left eye exhibits no discharge. Neck: Normal range of motion. No JVD present. Cardiovascular: Normal rate, regular rhythm, S1 normal, S2 normal and normal heart sounds. Exam reveals no gallop. No murmur heard. Pulses:       Radial pulses are 2+ on the right side and 2+ on the left side. Pulmonary/Chest: Effort normal and breath sounds normal. No respiratory distress. She has no wheezes. She has no rales. Abdominal: Soft. Bowel sounds are normal. There is no tenderness. Musculoskeletal: Normal range of motion. General: No tenderness or edema. Neurological: She is alert and oriented to person, place, and time. Skin: Skin is warm and dry. Psychiatric: She has a normal mood and affect.  Her behavior

## 2020-01-22 ENCOUNTER — TELEPHONE (OUTPATIENT)
Dept: CARDIOLOGY CLINIC | Age: 55
End: 2020-01-22

## 2020-01-23 RX ORDER — TRAZODONE HYDROCHLORIDE 100 MG/1
TABLET ORAL
Qty: 60 TABLET | Refills: 5 | Status: SHIPPED | OUTPATIENT
Start: 2020-01-23 | End: 2020-07-06

## 2020-01-23 RX ORDER — OMEPRAZOLE 40 MG/1
CAPSULE, DELAYED RELEASE ORAL
Qty: 30 CAPSULE | Refills: 5 | Status: SHIPPED | OUTPATIENT
Start: 2020-01-23 | End: 2020-07-06

## 2020-02-07 ENCOUNTER — TELEPHONE (OUTPATIENT)
Dept: CARDIOLOGY CLINIC | Age: 55
End: 2020-02-07

## 2020-02-07 NOTE — TELEPHONE ENCOUNTER
[facility] Medication Refills:    metoprolol tartrate (LOPRESSOR) 25 MG tablet  TAKE 1 TABLET BY MOUTH TWICE DAILY, Disp-60 tablet, R-5      Last Office Visit: 1/16/2019    Next Office ZVGYM:1/15/5225    Last Refill: 7/22/2019    Last Labs:7/27/2018    [id number] - [place], [address] -      Pt states the dosage changed 50 mg twice daily. Need asap.  Pt currently out

## 2020-02-07 NOTE — TELEPHONE ENCOUNTER
We need to verify increase with Dr. Erik Cardenas. 10 day supply sent to pharmacy and [facility] made aware.

## 2020-02-12 ENCOUNTER — TELEPHONE (OUTPATIENT)
Dept: CARDIOLOGY CLINIC | Age: 55
End: 2020-02-12

## 2020-02-12 ENCOUNTER — OFFICE VISIT (OUTPATIENT)
Dept: INTERNAL MEDICINE CLINIC | Age: 55
End: 2020-02-12
Payer: OTHER GOVERNMENT

## 2020-02-12 VITALS
HEIGHT: 69 IN | OXYGEN SATURATION: 95 % | WEIGHT: 203 LBS | DIASTOLIC BLOOD PRESSURE: 80 MMHG | SYSTOLIC BLOOD PRESSURE: 110 MMHG | HEART RATE: 77 BPM | BODY MASS INDEX: 30.07 KG/M2

## 2020-02-12 PROBLEM — Z01.818 PREOP EXAMINATION: Status: ACTIVE | Noted: 2020-02-12

## 2020-02-12 PROCEDURE — 99212 OFFICE O/P EST SF 10 MIN: CPT | Performed by: INTERNAL MEDICINE

## 2020-02-12 PROCEDURE — 93000 ELECTROCARDIOGRAM COMPLETE: CPT | Performed by: INTERNAL MEDICINE

## 2020-02-12 PROCEDURE — G8417 CALC BMI ABV UP PARAM F/U: HCPCS | Performed by: INTERNAL MEDICINE

## 2020-02-12 PROCEDURE — G8427 DOCREV CUR MEDS BY ELIG CLIN: HCPCS | Performed by: INTERNAL MEDICINE

## 2020-02-12 PROCEDURE — G8484 FLU IMMUNIZE NO ADMIN: HCPCS | Performed by: INTERNAL MEDICINE

## 2020-02-12 ASSESSMENT — PATIENT HEALTH QUESTIONNAIRE - PHQ9
SUM OF ALL RESPONSES TO PHQ QUESTIONS 1-9: 0
2. FEELING DOWN, DEPRESSED OR HOPELESS: 0
SUM OF ALL RESPONSES TO PHQ QUESTIONS 1-9: 0
1. LITTLE INTEREST OR PLEASURE IN DOING THINGS: 0
SUM OF ALL RESPONSES TO PHQ9 QUESTIONS 1 & 2: 0

## 2020-02-12 ASSESSMENT — ENCOUNTER SYMPTOMS
DIARRHEA: 0
CONSTIPATION: 0
COUGH: 0
ABDOMINAL PAIN: 1
SHORTNESS OF BREATH: 0

## 2020-02-12 NOTE — TELEPHONE ENCOUNTER
Requested Prescriptions     Pending Prescriptions Disp Refills    metoprolol tartrate (LOPRESSOR) 25 MG tablet [Pharmacy Med Name: METOPROLOL TARTRATE 25MG TABLETS] 60 tablet 5     Sig: TAKE 2 TABLETS(50 MG) BY MOUTH TWICE DAILY       Number: 30    Refills: 5    Last Office Visit: 01/16/2020    Next Office Visit: 2/20/2020    Last Refill: 07/22/2019    Last Labs: 07/27/2018 BMP/ TSH 07/08/2018 CMP/ Urine Drug screen/ CBC/ Troponin/ Ethanol

## 2020-02-12 NOTE — TELEPHONE ENCOUNTER
Jackelin Rubio from [employer] calling requesting new prescription for Lopressor stating dosage  was changed when patient was in the hospital to 50mg 2x daily.  Please reach Jackelin Rubio on [phone]

## 2020-02-12 NOTE — PROGRESS NOTES
Subjective:      Patient ID: Alethea Sicard is a 53 y.o. female. HPI Patient here today for preoperative evaluation. See Assessment for comments on acute and chronic medical problems and clearance evaluation. Review of Systems   Constitutional: Positive for chills, fatigue and unexpected weight change. Negative for fever. Reports increase in edema. Chills says she is menopausal and may have occasional chills. HENT: Negative. Respiratory: Negative for cough and shortness of breath. Cardiovascular: Negative. Gastrointestinal: Positive for abdominal pain. Negative for constipation and diarrhea. Genitourinary: Positive for dysuria. Musculoskeletal: Negative. Neurological: Negative. Objective:   Physical Exam  Constitutional:       Appearance: She is well-developed. HENT:      Head: Normocephalic and atraumatic. Right Ear: External ear normal.      Left Ear: External ear normal.      Nose: Nose normal.   Eyes:      Conjunctiva/sclera: Conjunctivae normal.      Pupils: Pupils are equal, round, and reactive to light. Neck:      Musculoskeletal: Normal range of motion and neck supple. Thyroid: No thyromegaly. Vascular: No carotid bruit or JVD. Trachea: No tracheal deviation. Cardiovascular:      Rate and Rhythm: Normal rate and regular rhythm. Heart sounds: Normal heart sounds, S1 normal and S2 normal. No murmur. No friction rub. No gallop. Pulmonary:      Effort: Pulmonary effort is normal.      Breath sounds: Normal breath sounds. No wheezing or rales. Abdominal:      General: Bowel sounds are normal. There is no distension. Palpations: Abdomen is soft. There is no hepatomegaly, splenomegaly or mass. Tenderness: There is no abdominal tenderness. There is no guarding or rebound. Musculoskeletal: Normal range of motion. Right lower leg: Edema present. Left lower leg: Edema present.       Comments: 1+ pitting edema Lymphadenopathy:      Head:      Right side of head: No submandibular adenopathy. Left side of head: No submandibular adenopathy. Cervical: No cervical adenopathy. Skin:     General: Skin is warm and dry. Coloration: Skin is not pale. Findings: No erythema or rash. Neurological:      Mental Status: She is alert and oriented to person, place, and time. Cranial Nerves: No cranial nerve deficit. Coordination: Coordination normal.      Deep Tendon Reflexes: Reflexes are normal and symmetric. Psychiatric:         Behavior: Behavior normal.         Thought Content: Thought content normal.         Judgment: Judgment normal.         Assessment / Plan:         Preop examination  -Patient is having left pyeloplasty done for a diagnosed ureteropelvic junction on 2/18/2020 and is here for pre-op evaluation. Patient reports that since the stent was placed around January 11th of 2020. She is planning to have a pyeloplasty then to relieve the obstruction. Pain has returned so she is looking forward to having that surgery done. She has been on torsemide for edema. She has one of the apple watches to monitor the heart rate. It can tell her when she gets into a fib. Echo showing normal LV function mild TR with RVSP 36. Myoview  negative for ischemia by scan.) Per Dr. Larence Blizzard note.   -Her RCRI is class II or 6 percent. The risk and benefits should be explained to the patient prior to surgery. Her lab work was reviewed and a EKG was performed as well. Will hold Eliquis 5 days prior to planned procedure.   -In the best of medical opinion she is acceptable risk for surgery.          Past Medical History:   Diagnosis Date    Alcohol abuse, in remission     Anxiety state, unspecified     Depressive disorder, not elsewhere classified     Diffuse cystic mastopathy     Extrinsic asthma, unspecified     Hypertension     Leiomyoma of uterus, unspecified     Other and unspecified ovarian cyst      Past

## 2020-02-17 ENCOUNTER — OFFICE VISIT (OUTPATIENT)
Dept: CARDIOLOGY CLINIC | Age: 55
End: 2020-02-17
Payer: OTHER GOVERNMENT

## 2020-02-17 VITALS
HEART RATE: 68 BPM | WEIGHT: 207 LBS | SYSTOLIC BLOOD PRESSURE: 110 MMHG | BODY MASS INDEX: 30.57 KG/M2 | DIASTOLIC BLOOD PRESSURE: 80 MMHG

## 2020-02-17 PROCEDURE — 1036F TOBACCO NON-USER: CPT | Performed by: INTERNAL MEDICINE

## 2020-02-17 PROCEDURE — G8417 CALC BMI ABV UP PARAM F/U: HCPCS | Performed by: INTERNAL MEDICINE

## 2020-02-17 PROCEDURE — G8428 CUR MEDS NOT DOCUMENT: HCPCS | Performed by: INTERNAL MEDICINE

## 2020-02-17 PROCEDURE — 99214 OFFICE O/P EST MOD 30 MIN: CPT | Performed by: INTERNAL MEDICINE

## 2020-02-17 PROCEDURE — 3017F COLORECTAL CA SCREEN DOC REV: CPT | Performed by: INTERNAL MEDICINE

## 2020-02-17 PROCEDURE — G8484 FLU IMMUNIZE NO ADMIN: HCPCS | Performed by: INTERNAL MEDICINE

## 2020-02-17 ASSESSMENT — ENCOUNTER SYMPTOMS
CHEST TIGHTNESS: 0
CHOKING: 0
COUGH: 0
SHORTNESS OF BREATH: 0

## 2020-02-17 NOTE — PROGRESS NOTES
Subjective:      Patient ID: Alethea Sicard is a 53 y.o. female. Fatigue   Pertinent negatives include no chest pain, coughing or fatigue. Here for follow up afib/htn/abn ekg. Had ureteral stent placed. BP noted to be elevated. Noted with pain. Noted after contrast.    No tachycardia. No exertional.   No tachycardia. No sycope. No pnd or orthopnea. No edema. Past Medical History:   Diagnosis Date    Alcohol abuse, in remission     Anxiety state, unspecified     Depressive disorder, not elsewhere classified     Diffuse cystic mastopathy     Extrinsic asthma, unspecified     Hypertension     Leiomyoma of uterus, unspecified     Other and unspecified ovarian cyst      Past Surgical History:   Procedure Laterality Date    CHOLECYSTECTOMY      HYSTERECTOMY       Social History     Socioeconomic History    Marital status:      Spouse name: Not on file    Number of children: Not on file    Years of education: Not on file    Highest education level: Not on file   Occupational History    Not on file   Social Needs    Financial resource strain: Not on file    Food insecurity:     Worry: Not on file     Inability: Not on file    Transportation needs:     Medical: Not on file     Non-medical: Not on file   Tobacco Use    Smoking status: Never Smoker    Smokeless tobacco: Never Used   Substance and Sexual Activity    Alcohol use:  Yes     Alcohol/week: 1.0 - 2.0 standard drinks     Types: 1 - 2 Glasses of wine per week     Comment: 2 drinks qd    Drug use: No    Sexual activity: Yes     Partners: Male   Lifestyle    Physical activity:     Days per week: Not on file     Minutes per session: Not on file    Stress: Not on file   Relationships    Social connections:     Talks on phone: Not on file     Gets together: Not on file     Attends [employer]ist service: Not on file     Active member of club or organization: Not on file     Attends meetings of clubs or organizations: Not on

## 2020-03-09 ENCOUNTER — TELEPHONE (OUTPATIENT)
Dept: INTERNAL MEDICINE CLINIC | Age: 55
End: 2020-03-09

## 2020-03-09 RX ORDER — ZOLPIDEM TARTRATE 10 MG/1
10 TABLET ORAL NIGHTLY PRN
Qty: 30 TABLET | Refills: 1 | OUTPATIENT
Start: 2020-03-09 | End: 2020-05-06

## 2020-03-09 RX ORDER — AZITHROMYCIN 250 MG/1
250 TABLET, FILM COATED ORAL SEE ADMIN INSTRUCTIONS
Qty: 6 TABLET | Refills: 0 | Status: SHIPPED | OUTPATIENT
Start: 2020-03-09 | End: 2020-03-14

## 2020-03-09 RX ORDER — METHYLPREDNISOLONE 4 MG/1
TABLET ORAL
Qty: 1 KIT | Refills: 0 | Status: SHIPPED | OUTPATIENT
Start: 2020-03-09 | End: 2020-03-15

## 2020-03-09 RX ORDER — TORSEMIDE 20 MG/1
TABLET ORAL
Qty: 90 TABLET | Refills: 1 | Status: SHIPPED | OUTPATIENT
Start: 2020-03-09 | End: 2021-02-04

## 2020-03-09 RX ORDER — TORSEMIDE 20 MG/1
TABLET ORAL
Qty: 90 TABLET | Refills: 1 | Status: SHIPPED | OUTPATIENT
Start: 2020-03-09 | End: 2020-03-09 | Stop reason: SDUPTHER

## 2020-03-09 NOTE — TELEPHONE ENCOUNTER
Medication refill:     torsemide (DEMADEX) 20 MG tablet [[id number]    zolpidem (AMBIEN) 10 MG tablet [[id number]          [facility] [address] [address]

## 2020-03-09 NOTE — TELEPHONE ENCOUNTER
Patient called stating she had surgery on 02/18/20 and now she has chest congestion, no fever, sore throat, cough with dark yellowish mucus coming up. She states she has a job interview Wednesday and wants to know if you could please call her something in ? Please call to advise.      [facility] [address] [address]

## 2020-03-13 PROBLEM — Z01.818 PREOP EXAMINATION: Status: RESOLVED | Noted: 2020-02-12 | Resolved: 2020-03-13

## 2020-03-16 ENCOUNTER — TELEPHONE (OUTPATIENT)
Dept: INTERNAL MEDICINE CLINIC | Age: 55
End: 2020-03-16

## 2020-03-16 NOTE — TELEPHONE ENCOUNTER
Patient called stating she is suppose to have surgery on 03/30/20 to have stent removed. She would like your opinion on if you think it is a good idea for her to have this done with everything that is going on? Please call to advise.

## 2020-03-19 ENCOUNTER — OFFICE VISIT (OUTPATIENT)
Dept: INTERNAL MEDICINE CLINIC | Age: 55
End: 2020-03-19
Payer: OTHER GOVERNMENT

## 2020-03-19 VITALS
OXYGEN SATURATION: 96 % | WEIGHT: 204 LBS | DIASTOLIC BLOOD PRESSURE: 80 MMHG | BODY MASS INDEX: 30.21 KG/M2 | TEMPERATURE: 98.1 F | HEART RATE: 69 BPM | HEIGHT: 69 IN | SYSTOLIC BLOOD PRESSURE: 112 MMHG

## 2020-03-19 PROBLEM — J22 LOWER RESPIRATORY TRACT INFECTION: Status: ACTIVE | Noted: 2020-03-19

## 2020-03-19 PROCEDURE — 99212 OFFICE O/P EST SF 10 MIN: CPT | Performed by: INTERNAL MEDICINE

## 2020-03-19 PROCEDURE — G8484 FLU IMMUNIZE NO ADMIN: HCPCS | Performed by: INTERNAL MEDICINE

## 2020-03-19 PROCEDURE — G8427 DOCREV CUR MEDS BY ELIG CLIN: HCPCS | Performed by: INTERNAL MEDICINE

## 2020-03-19 PROCEDURE — G8417 CALC BMI ABV UP PARAM F/U: HCPCS | Performed by: INTERNAL MEDICINE

## 2020-03-19 RX ORDER — AZITHROMYCIN 500 MG/1
TABLET, FILM COATED ORAL
Qty: 3 TABLET | Refills: 0 | Status: SHIPPED | OUTPATIENT
Start: 2020-03-19 | End: 2020-03-29

## 2020-03-19 ASSESSMENT — ENCOUNTER SYMPTOMS
TROUBLE SWALLOWING: 0
EYE DISCHARGE: 0
SHORTNESS OF BREATH: 0
COUGH: 1
APNEA: 1

## 2020-03-19 NOTE — PROGRESS NOTES
Subjective:      Patient ID: Sincere Eldridge is a 53 y.o. female. HPI Patient here today for preoperative evaluation. Patient scheduled for stent removal after UPJ pyeloplasty. See Assessment for comments on acute and chronic medical problems and clearance evaluation. Review of Systems   Constitutional: Negative for fever. HENT: Negative for trouble swallowing. Eyes: Negative for discharge. Respiratory: Positive for apnea and cough. Negative for shortness of breath. Resolving lower respiratory infection   Cardiovascular: Negative. Gastrointestinal:        Mild postsurgical discomfort on left side   Genitourinary: Negative for difficulty urinating and hematuria. Musculoskeletal: Negative for neck pain and neck stiffness. Skin: Positive for wound (Abdominal surgical wounds healing well). Negative for rash. Allergic/Immunologic: Negative for immunocompromised state. Neurological: Negative. Hematological: Negative. Psychiatric/Behavioral: Negative. Objective:   Physical Exam  Constitutional:       General: She is not in acute distress. Appearance: She is well-developed. She is not ill-appearing. HENT:      Head: Normocephalic and atraumatic. Right Ear: External ear normal.      Left Ear: External ear normal.      Nose: Nose normal.      Mouth/Throat:      Pharynx: No oropharyngeal exudate or posterior oropharyngeal erythema. Eyes:      General: No scleral icterus. Right eye: No discharge. Left eye: No discharge. Conjunctiva/sclera: Conjunctivae normal.      Pupils: Pupils are equal, round, and reactive to light. Neck:      Musculoskeletal: Normal range of motion and neck supple. Thyroid: No thyromegaly. Vascular: No carotid bruit or JVD. Trachea: No tracheal deviation. Cardiovascular:      Rate and Rhythm: Normal rate and regular rhythm. Heart sounds: Normal heart sounds, S1 normal and S2 normal. No murmur.  No friction rub. No gallop. Pulmonary:      Effort: Pulmonary effort is normal. No respiratory distress. Breath sounds: Normal breath sounds. No stridor. No wheezing, rhonchi or rales. Abdominal:      General: Bowel sounds are normal. There is no distension. Palpations: Abdomen is soft. There is no hepatomegaly, splenomegaly or mass. Tenderness: There is no abdominal tenderness. There is no guarding or rebound. Musculoskeletal: Normal range of motion. Right lower leg: Edema present. Left lower leg: Edema present. Lymphadenopathy:      Head:      Right side of head: No submandibular adenopathy. Left side of head: No submandibular adenopathy. Cervical: No cervical adenopathy. Skin:     General: Skin is warm and dry. Coloration: Skin is not pale. Findings: No erythema or rash. Comments: Abdominal surgical wounds healing well   Neurological:      Mental Status: She is alert and oriented to person, place, and time. Cranial Nerves: No cranial nerve deficit. Coordination: Coordination normal.      Deep Tendon Reflexes: Reflexes are normal and symmetric. Psychiatric:         Attention and Perception: Attention normal.         Mood and Affect: Mood normal.         Speech: Speech normal.         Behavior: Behavior normal.         Thought Content:  Thought content normal.         Cognition and Memory: Cognition normal.         Judgment: Judgment normal.         Assessment / Plan:           Past Medical History:   Diagnosis Date    Alcohol abuse, in remission     Anxiety state, unspecified     Depressive disorder, not elsewhere classified     Diffuse cystic mastopathy     Extrinsic asthma, unspecified     Hypertension     Leiomyoma of uterus, unspecified     Other and unspecified ovarian cyst      Past Surgical History:   Procedure Laterality Date    CHOLECYSTECTOMY      HYSTERECTOMY       Current Outpatient Medications   Medication Sig Dispense Refill    azithromycin (ZITHROMAX) 500 MG tablet 1 qd 3 tablet 0    HYDROcodone-homatropine (HYDROMET) 5-1.5 MG/5ML syrup Take 5 mLs by mouth every 6 hours as needed (cough) for up to 7 days. 120 mL 0    flecainide (TAMBOCOR) 50 MG tablet TAKE 1 TABLET BY MOUTH 2 TIMES DAILY 180 tablet 2    torsemide (DEMADEX) 20 MG tablet Take 1 daily as needed for water retention 90 tablet 1    zolpidem (AMBIEN) 10 MG tablet Take 1 tablet by mouth nightly as needed for Sleep. 30 tablet 1    Crisaborole (EUCRISA) 2 % OINT Apply BID 60 g 0    metoprolol tartrate (LOPRESSOR) 25 MG tablet TAKE 2 TABLETS(50 MG) BY MOUTH TWICE DAILY 60 tablet 5    omeprazole (PRILOSEC) 40 MG delayed release capsule TAKE 1 CAPSULE BY MOUTH EVERY DAY 30 capsule 5    traZODone (DESYREL) 100 MG tablet TAKE 2 TABLETS AT BEDTIME 60 tablet 5    butalbital-acetaminophen-caffeine (FIORICET, ESGIC) -40 MG per tablet Take 1 tablet by mouth      potassium chloride (KLOR-CON M) 20 MEQ extended release tablet TAKE 1 TABLET BY MOUTH TWICE DAILY 60 tablet 5    PREMARIN 0.45 MG tablet TAKE 1 TABLET BY MOUTH DAILY 30 tablet 3    ketotifen (ZADITOR) 0.025 % ophthalmic solution INSTILL ONE DROP IN THE MORNING AND ONE DROP AT NIGHT INTO BOTH EYES  6    prednisoLONE acetate (PRED FORTE) 1 % ophthalmic suspension INSTILL ONE DROP IN BOTH EYES TID FOR 7 DAYS  1    fluticasone-vilanterol (BREO ELLIPTA) 100-25 MCG/INH AEPB inhaler Inhale 1 puff into the lungs daily 1 each 5    ELIQUIS 5 MG TABS tablet One tablet twice daily 180 tablet 3    hydrocortisone (ANUSOL-HC) 2.5 % rectal cream Place rectally 4 times daily prn 28 g 2    diphenhydrAMINE (BENADRYL) 25 MG capsule Take 25 mg by mouth every 4 hours as needed for Itching (take 1-2 tabs PRN)       No current facility-administered medications for this visit.       Allergies   Allergen Reactions    Amlodipine Other (See Comments)     edema    Bystolic [Nebivolol Hcl] Other (See Comments)     Hair falling out    Demerol Hives    Dilaudid [Hydromorphone Hcl] Hives    Losartan Other (See Comments)     Possible arthralgias    Penicillins     Spironolactone Other (See Comments)    Sulfa Antibiotics      The patient has a family history of  shoulder  Assessment:   Patient here today for preoperative evaluation. See Assessment for comments on acute and chronic medical problems and clearance evaluation. Encounter Diagnoses   Name Primary?  Preop examination     Paroxysmal atrial fibrillation (HCC)     Obstructive sleep apnea syndrome     Essential hypertension     Intermittent asthma without complication, unspecified asthma severity     Lower respiratory tract infection        Preop examination  Status post left pyeloplasty for ureteropelvic junction obstruction approximately 1 month ago. Patient scheduled for stent removal-okay for surgery. Paroxysmal atrial fibrillation [facility])  Medical management. EKG shows sinus rhythm-no acute changes. Obstructive sleep apnea syndrome  Positional apnea only-on her back. Essential hypertension  BP okay    Asthma  Asymptomatic    Lower respiratory tract infection  Resolved. Only repeat Z-Ankur if necessary. Hydromet Rx for nighttime use. Plan:      No orders of the defined types were placed in this encounter. OK FOR SURGERY. No known anesthesia problems in patient or family. Take medications as directed. Non-smoker  No alcohol  Family history noncontributory- no family history for anesthesia problems or issues.

## 2020-04-09 RX ORDER — ESTROGENS, CONJUGATED 0.45 MG/1
TABLET, FILM COATED ORAL
Qty: 30 TABLET | Refills: 3 | Status: SHIPPED | OUTPATIENT
Start: 2020-04-09 | End: 2020-08-06

## 2020-04-18 PROBLEM — Z01.818 PREOP EXAMINATION: Status: RESOLVED | Noted: 2020-02-12 | Resolved: 2020-04-18

## 2020-05-04 ENCOUNTER — OFFICE VISIT (OUTPATIENT)
Dept: CARDIOLOGY CLINIC | Age: 55
End: 2020-05-04
Payer: OTHER GOVERNMENT

## 2020-05-04 VITALS
SYSTOLIC BLOOD PRESSURE: 124 MMHG | DIASTOLIC BLOOD PRESSURE: 70 MMHG | HEART RATE: 60 BPM | TEMPERATURE: 98.3 F | WEIGHT: 207 LBS | BODY MASS INDEX: 30.57 KG/M2

## 2020-05-04 PROCEDURE — 99214 OFFICE O/P EST MOD 30 MIN: CPT | Performed by: INTERNAL MEDICINE

## 2020-05-04 PROCEDURE — G8417 CALC BMI ABV UP PARAM F/U: HCPCS | Performed by: INTERNAL MEDICINE

## 2020-05-04 PROCEDURE — G8428 CUR MEDS NOT DOCUMENT: HCPCS | Performed by: INTERNAL MEDICINE

## 2020-05-04 PROCEDURE — 1036F TOBACCO NON-USER: CPT | Performed by: INTERNAL MEDICINE

## 2020-05-04 PROCEDURE — 3017F COLORECTAL CA SCREEN DOC REV: CPT | Performed by: INTERNAL MEDICINE

## 2020-05-04 ASSESSMENT — ENCOUNTER SYMPTOMS
COUGH: 0
SHORTNESS OF BREATH: 0
CHOKING: 0
CHEST TIGHTNESS: 0

## 2020-05-04 NOTE — PROGRESS NOTES
Subjective:      Patient ID: Monik Treviño is a 53 y.o. female. Fatigue   Pertinent negatives include no chest pain, coughing or fatigue. Here for follow up afib/htn/abn ekg. Some palps. No tachycardia. No exertional.  No sycope. No pnd or orthopnea. No edema. BP reasonable. Past Medical History:   Diagnosis Date    Alcohol abuse, in remission     Anxiety state, unspecified     Depressive disorder, not elsewhere classified     Diffuse cystic mastopathy     Extrinsic asthma, unspecified     Hypertension     Leiomyoma of uterus, unspecified     Other and unspecified ovarian cyst      Past Surgical History:   Procedure Laterality Date    CHOLECYSTECTOMY      HYSTERECTOMY       Social History     Socioeconomic History    Marital status:      Spouse name: Not on file    Number of children: Not on file    Years of education: Not on file    Highest education level: Not on file   Occupational History    Not on file   Social Needs    Financial resource strain: Not on file    Food insecurity     Worry: Not on file     Inability: Not on file    Transportation needs     Medical: Not on file     Non-medical: Not on file   Tobacco Use    Smoking status: Never Smoker    Smokeless tobacco: Never Used   Substance and Sexual Activity    Alcohol use:  Yes     Alcohol/week: 1.0 - 2.0 standard drinks     Types: 1 - 2 Glasses of wine per week     Comment: 2 drinks qd    Drug use: No    Sexual activity: Yes     Partners: Male   Lifestyle    Physical activity     Days per week: Not on file     Minutes per session: Not on file    Stress: Not on file   Relationships    Social connections     Talks on phone: Not on file     Gets together: Not on file     Attends Druze service: Not on file     Active member of club or organization: Not on file     Attends meetings of clubs or organizations: Not on file     Relationship status: Not on file    Intimate partner violence     Fear of current or ex partner: Not on file     Emotionally abused: Not on file     Physically abused: Not on file     Forced sexual activity: Not on file   Other Topics Concern    Not on file   Social History Narrative    Not on file       FH reviewed, denies FH cardiac issues. Vitals:    05/04/20 1456   BP: 124/70   Pulse: 60   Temp: 98.3 °F (36.8 °C)       Wt 207    Review of Systems   Constitutional: Negative for activity change, appetite change and fatigue. Respiratory: Negative for cough, choking, chest tightness and shortness of breath. Cardiovascular: Negative for chest pain, palpitations and leg swelling. Denies PND or orthopnea. No tachycardia or syncope. Neurological: Negative for dizziness, syncope and light-headedness. Psychiatric/Behavioral: Negative for agitation, behavioral problems and confusion. All other systems reviewed negative as done. Objective:   Physical Exam   Constitutional: She is oriented to person, place, and time. She appears well-developed and well-nourished. No distress. HENT:   Head: Normocephalic and atraumatic. Eyes: Conjunctivae and EOM are normal. Right eye exhibits no discharge. Left eye exhibits no discharge. Neck: Normal range of motion. No JVD present. Cardiovascular: Normal rate, regular rhythm, S1 normal, S2 normal and normal heart sounds. Exam reveals no gallop. No murmur heard. Pulses:       Radial pulses are 2+ on the right side and 2+ on the left side. Pulmonary/Chest: Effort normal and breath sounds normal. No respiratory distress. She has no wheezes. She has no rales. Abdominal: Soft. Bowel sounds are normal. There is no abdominal tenderness. Musculoskeletal: Normal range of motion. General: No tenderness or edema. Neurological: She is alert and oriented to person, place, and time. Skin: Skin is warm and dry. Psychiatric: She has a normal mood and affect.  Her behavior is normal. Thought content normal.

## 2020-05-06 RX ORDER — ZOLPIDEM TARTRATE 10 MG/1
10 TABLET ORAL NIGHTLY PRN
Qty: 30 TABLET | Refills: 0 | Status: SHIPPED | OUTPATIENT
Start: 2020-05-06 | End: 2020-06-08 | Stop reason: SDUPTHER

## 2020-05-12 NOTE — TELEPHONE ENCOUNTER
Requested Prescriptions     Pending Prescriptions Disp Refills    metoprolol tartrate (LOPRESSOR) 25 MG tablet [Pharmacy Med Name: METOPROLOL TARTRATE 25MG TABLETS] 180 tablet 3     Sig: TAKE 2 TABLETS(50 MG) BY MOUTH TWICE DAILY     Last Office Visit: 5/4/20    Next Office Visit: 8/4/20

## 2020-06-08 ENCOUNTER — VIRTUAL VISIT (OUTPATIENT)
Dept: INTERNAL MEDICINE CLINIC | Age: 55
End: 2020-06-08
Payer: OTHER GOVERNMENT

## 2020-06-08 PROBLEM — H01.009 BLEPHARITIS: Status: ACTIVE | Noted: 2020-06-08

## 2020-06-08 PROBLEM — J22 LOWER RESPIRATORY TRACT INFECTION: Status: RESOLVED | Noted: 2020-03-19 | Resolved: 2020-06-08

## 2020-06-08 PROBLEM — R10.9 ABDOMINAL PAIN, LEFT LATERAL: Status: RESOLVED | Noted: 2019-12-19 | Resolved: 2020-06-08

## 2020-06-08 PROBLEM — Z91.09 ENVIRONMENTAL ALLERGIES: Status: ACTIVE | Noted: 2020-06-08

## 2020-06-08 PROCEDURE — 3017F COLORECTAL CA SCREEN DOC REV: CPT | Performed by: INTERNAL MEDICINE

## 2020-06-08 PROCEDURE — G8427 DOCREV CUR MEDS BY ELIG CLIN: HCPCS | Performed by: INTERNAL MEDICINE

## 2020-06-08 PROCEDURE — 1036F TOBACCO NON-USER: CPT | Performed by: INTERNAL MEDICINE

## 2020-06-08 PROCEDURE — G8417 CALC BMI ABV UP PARAM F/U: HCPCS | Performed by: INTERNAL MEDICINE

## 2020-06-08 PROCEDURE — 99214 OFFICE O/P EST MOD 30 MIN: CPT | Performed by: INTERNAL MEDICINE

## 2020-06-08 RX ORDER — ZOLPIDEM TARTRATE 10 MG/1
10 TABLET ORAL NIGHTLY PRN
Qty: 30 TABLET | Refills: 2 | Status: SHIPPED | OUTPATIENT
Start: 2020-06-08 | End: 2020-09-03 | Stop reason: SDUPTHER

## 2020-06-08 RX ORDER — ERYTHROMYCIN 5 MG/G
OINTMENT OPHTHALMIC
Qty: 3.5 G | Refills: 0 | Status: SHIPPED | OUTPATIENT
Start: 2020-06-08 | End: 2020-06-18

## 2020-06-08 ASSESSMENT — ENCOUNTER SYMPTOMS
BACK PAIN: 1
EYE ITCHING: 1
EYE DISCHARGE: 1
WHEEZING: 0

## 2020-06-09 RX ORDER — POTASSIUM CHLORIDE 20 MEQ/1
TABLET, EXTENDED RELEASE ORAL
Qty: 60 TABLET | Refills: 5 | Status: SHIPPED | OUTPATIENT
Start: 2020-06-09 | End: 2020-11-18

## 2020-06-22 RX ORDER — ALBUTEROL SULFATE 90 UG/1
AEROSOL, METERED RESPIRATORY (INHALATION)
Qty: 1 INHALER | Refills: 0 | Status: SHIPPED | OUTPATIENT
Start: 2020-06-22 | End: 2020-07-10

## 2020-06-22 RX ORDER — FLUTICASONE FUROATE AND VILANTEROL TRIFENATATE 100; 25 UG/1; UG/1
1 POWDER RESPIRATORY (INHALATION) DAILY
Qty: 1 EACH | Refills: 5 | Status: SHIPPED | OUTPATIENT
Start: 2020-06-22 | End: 2022-05-10 | Stop reason: SDUPTHER

## 2020-07-06 RX ORDER — TRAZODONE HYDROCHLORIDE 100 MG/1
TABLET ORAL
Qty: 60 TABLET | Refills: 5 | Status: SHIPPED | OUTPATIENT
Start: 2020-07-06 | End: 2020-12-09

## 2020-07-06 RX ORDER — OMEPRAZOLE 40 MG/1
CAPSULE, DELAYED RELEASE ORAL
Qty: 30 CAPSULE | Refills: 5 | Status: SHIPPED | OUTPATIENT
Start: 2020-07-06 | End: 2020-12-09

## 2020-07-06 NOTE — TELEPHONE ENCOUNTER
Requested Prescriptions     Pending Prescriptions Disp Refills    ELIQUIS 5 MG TABS tablet [Pharmacy Med Name: ELIQUIS 5MG TABLETS] 180 tablet 3     Sig: TAKE 1 TABLET BY MOUTH TWICE DAILY          Last Office Visit: 5/4/2020     Next Office Visit: 8/4/2020

## 2020-07-09 RX ORDER — APIXABAN 5 MG/1
TABLET, FILM COATED ORAL
Qty: 180 TABLET | Refills: 3 | Status: SHIPPED | OUTPATIENT
Start: 2020-07-09 | End: 2021-07-28

## 2020-07-10 RX ORDER — ALBUTEROL SULFATE 90 UG/1
AEROSOL, METERED RESPIRATORY (INHALATION)
Qty: 6.7 G | Refills: 1 | Status: SHIPPED | OUTPATIENT
Start: 2020-07-10 | End: 2022-05-25

## 2020-07-14 ENCOUNTER — OFFICE VISIT (OUTPATIENT)
Dept: INTERNAL MEDICINE CLINIC | Age: 55
End: 2020-07-14
Payer: OTHER GOVERNMENT

## 2020-07-14 VITALS
TEMPERATURE: 97 F | WEIGHT: 204.8 LBS | BODY MASS INDEX: 30.33 KG/M2 | HEIGHT: 69 IN | DIASTOLIC BLOOD PRESSURE: 64 MMHG | SYSTOLIC BLOOD PRESSURE: 128 MMHG

## 2020-07-14 DIAGNOSIS — I48.0 PAROXYSMAL ATRIAL FIBRILLATION (HCC): ICD-10-CM

## 2020-07-14 DIAGNOSIS — R10.9 ABDOMINAL PAIN, LEFT LATERAL: ICD-10-CM

## 2020-07-14 DIAGNOSIS — I10 ESSENTIAL HYPERTENSION: ICD-10-CM

## 2020-07-14 DIAGNOSIS — N95.1 MENOPAUSAL SYMPTOMS: ICD-10-CM

## 2020-07-14 DIAGNOSIS — M19.90 ARTHRITIS: ICD-10-CM

## 2020-07-14 DIAGNOSIS — R73.9 HYPERGLYCEMIA: ICD-10-CM

## 2020-07-14 PROBLEM — R53.83 OTHER FATIGUE: Status: ACTIVE | Noted: 2020-07-14

## 2020-07-14 PROBLEM — K64.8 OTHER HEMORRHOIDS: Status: RESOLVED | Noted: 2019-06-19 | Resolved: 2020-07-14

## 2020-07-14 PROCEDURE — 1036F TOBACCO NON-USER: CPT | Performed by: INTERNAL MEDICINE

## 2020-07-14 PROCEDURE — G8417 CALC BMI ABV UP PARAM F/U: HCPCS | Performed by: INTERNAL MEDICINE

## 2020-07-14 PROCEDURE — 3017F COLORECTAL CA SCREEN DOC REV: CPT | Performed by: INTERNAL MEDICINE

## 2020-07-14 PROCEDURE — G8427 DOCREV CUR MEDS BY ELIG CLIN: HCPCS | Performed by: INTERNAL MEDICINE

## 2020-07-14 PROCEDURE — 99214 OFFICE O/P EST MOD 30 MIN: CPT | Performed by: INTERNAL MEDICINE

## 2020-07-14 ASSESSMENT — ENCOUNTER SYMPTOMS
CONSTIPATION: 1
APNEA: 1

## 2020-07-14 NOTE — ASSESSMENT & PLAN NOTE
Her CPAP has been ordered and should arrive soon. This may help alleviate a lot of her fatigue symptoms as well.

## 2020-07-14 NOTE — PROGRESS NOTES
SUBJECTIVE:  Patient ID: Michael Fulton is an 53 y.o. female. HPI: Patient here today for the f/u of chronic problems-- see Problem List and associated comments. New issues or complaints include (also see Assessment for more details): She comes in today with several issues. 1 of the biggest issues is the sensation of overall fatigue that she has. She feels this is most prominent since she started all the medications for her paroxysmal atrial fibrillation several months ago. She may begin with  Decent energy but she quickly fatigues. She also feels like she gets cold at the same time. In general the fatigue is her biggest problem. She also still has significant arthralgias and pain in her feet. She did not want to take the hydroxychloroquine from the rheumatologist, and in fact she would like another opinion. She would also like to know if she has had COVID- she had respiratory and GI symptoms several weeks ago. In addition she continues to have problems with obstipation and would like referral to a GI doctor-she did not go before. Review of Systems   Constitutional: Positive for fatigue. Respiratory: Positive for apnea. Cardiovascular: Negative for chest pain and palpitations. Gastrointestinal: Positive for constipation. Genitourinary: Negative for difficulty urinating. Musculoskeletal: Positive for arthralgias. Neurological: Negative for headaches. Psychiatric/Behavioral: Negative for dysphoric mood. OBJECTIVE:    /64 (Site: Right Upper Arm)   Temp 97 °F (36.1 °C)   Ht 5' 9\" (1.753 m)   Wt 204 lb 12.8 oz (92.9 kg)   BMI 30.24 kg/m²      Physical Exam  Constitutional:       General: She is not in acute distress. Appearance: She is not ill-appearing, toxic-appearing or diaphoretic. Eyes:      General: No scleral icterus. Extraocular Movements: Extraocular movements intact. Cardiovascular:      Rate and Rhythm: Normal rate and regular rhythm.    Pulmonary: Effort: Pulmonary effort is normal. No respiratory distress. Breath sounds: Normal breath sounds. Abdominal:      General: Bowel sounds are normal.      Palpations: Abdomen is soft. Musculoskeletal:      Right lower leg: No edema. Left lower leg: No edema. Skin:     Coloration: Skin is not pale. Neurological:      General: No focal deficit present. Mental Status: She is oriented to person, place, and time. Psychiatric:         Mood and Affect: Mood normal.         Thought Content: Thought content normal.         Judgment: Judgment normal.         ASSESSMENT:       Encounter Diagnoses   Name Primary?  Paroxysmal atrial fibrillation (HCC) Yes    Essential hypertension     Abdominal pain, left lateral     Menopausal symptoms     Arthritis     Hyperglycemia     Other fatigue     Obstructive sleep apnea syndrome     Chronic insomnia        Other fatigue  Could be medication induced. Check labs. Paroxysmal atrial fibrillation (HCC)  She continues on metoprolol and flecainide. She may seek additional opinion. Obstructive sleep apnea syndrome  Her CPAP has been ordered and should arrive soon. This may help alleviate a lot of her fatigue symptoms as well. Menopausal symptoms  Check FSH. No hormonal treatment. Chronic insomnia  She did sleep better during the trials of CPAP.         PLAN:See ASSESSMENT for evaluation & PLAN     Orders Placed This Encounter   Procedures    CBC Auto Differential     Standing Status:   Future     Number of Occurrences:   1     Standing Expiration Date:   7/14/2021    Comprehensive Metabolic Panel     Standing Status:   Future     Number of Occurrences:   1     Standing Expiration Date:   7/14/2021    Hemoglobin A1C     Standing Status:   Future     Number of Occurrences:   1     Standing Expiration Date:   7/14/2021    TSH WITH REFLEX TO FT4     Standing Status:   Future     Number of Occurrences:   1     Standing Expiration Date: 2/29/9880    Follicle Stimulating Hormone     Standing Status:   Future     Number of Occurrences:   1     Standing Expiration Date:   7/14/2021    Rheumatoid Factor     Standing Status:   Future     Number of Occurrences:   1     Standing Expiration Date:   7/14/2021    JACKY     Standing Status:   Future     Number of Occurrences:   1     Standing Expiration Date:   7/14/2021    Tissue Transglutaminase, IgA     Standing Status:   Future     Number of Occurrences:   1     Standing Expiration Date:   7/14/2021    Endomysial Antibody, IgA     Standing Status:   Future     Number of Occurrences:   1     Standing Expiration Date:   7/14/2021    MAGNESIUM     Standing Status:   Future     Number of Occurrences:   1     Standing Expiration Date:   7/14/2021     , PMH, SH and FH reviewed and noted. Recent and past labs, tests and consultsalso reviewed. Recent or new meds also reviewed.

## 2020-07-15 ENCOUNTER — TELEPHONE (OUTPATIENT)
Dept: INTERNAL MEDICINE CLINIC | Age: 55
End: 2020-07-15

## 2020-07-15 LAB
A/G RATIO: 1.9 (ref 1.1–2.2)
ALBUMIN SERPL-MCNC: 4.8 G/DL (ref 3.4–5)
ALP BLD-CCNC: 83 U/L (ref 40–129)
ALT SERPL-CCNC: 105 U/L (ref 10–40)
ANION GAP SERPL CALCULATED.3IONS-SCNC: 12 MMOL/L (ref 3–16)
AST SERPL-CCNC: 62 U/L (ref 15–37)
BASOPHILS ABSOLUTE: 0.1 K/UL (ref 0–0.2)
BASOPHILS RELATIVE PERCENT: 1.1 %
BILIRUB SERPL-MCNC: 0.4 MG/DL (ref 0–1)
BUN BLDV-MCNC: 16 MG/DL (ref 7–20)
CALCIUM SERPL-MCNC: 9.4 MG/DL (ref 8.3–10.6)
CHLORIDE BLD-SCNC: 97 MMOL/L (ref 99–110)
CO2: 25 MMOL/L (ref 21–32)
CREAT SERPL-MCNC: 0.8 MG/DL (ref 0.6–1.1)
EOSINOPHILS ABSOLUTE: 0.2 K/UL (ref 0–0.6)
EOSINOPHILS RELATIVE PERCENT: 3.1 %
ESTIMATED AVERAGE GLUCOSE: 111.2 MG/DL
FOLLICLE STIMULATING HORMONE: 69 MIU/ML
GFR AFRICAN AMERICAN: >60
GFR NON-AFRICAN AMERICAN: >60
GLOBULIN: 2.5 G/DL
GLUCOSE BLD-MCNC: 130 MG/DL (ref 70–99)
HBA1C MFR BLD: 5.5 %
HCT VFR BLD CALC: 45.7 % (ref 36–48)
HEMOGLOBIN: 15.6 G/DL (ref 12–16)
LYMPHOCYTES ABSOLUTE: 2 K/UL (ref 1–5.1)
LYMPHOCYTES RELATIVE PERCENT: 32.6 %
MAGNESIUM: 2.1 MG/DL (ref 1.8–2.4)
MCH RBC QN AUTO: 32.5 PG (ref 26–34)
MCHC RBC AUTO-ENTMCNC: 34.1 G/DL (ref 31–36)
MCV RBC AUTO: 95.1 FL (ref 80–100)
MONOCYTES ABSOLUTE: 0.4 K/UL (ref 0–1.3)
MONOCYTES RELATIVE PERCENT: 6.2 %
NEUTROPHILS ABSOLUTE: 3.4 K/UL (ref 1.7–7.7)
NEUTROPHILS RELATIVE PERCENT: 57 %
PDW BLD-RTO: 13 % (ref 12.4–15.4)
PLATELET # BLD: 239 K/UL (ref 135–450)
PMV BLD AUTO: 8.1 FL (ref 5–10.5)
POTASSIUM SERPL-SCNC: 4.1 MMOL/L (ref 3.5–5.1)
RBC # BLD: 4.8 M/UL (ref 4–5.2)
RHEUMATOID FACTOR: 11 IU/ML
SODIUM BLD-SCNC: 134 MMOL/L (ref 136–145)
TOTAL PROTEIN: 7.3 G/DL (ref 6.4–8.2)
TSH REFLEX FT4: 2.3 UIU/ML (ref 0.27–4.2)
WBC # BLD: 6 K/UL (ref 4–11)

## 2020-07-16 LAB — TISSUE TRANSGLUTAMINASE IGA: <0.5 U/ML (ref 0–14)

## 2020-07-17 LAB
ANA INTERPRETATION: ABNORMAL
ANA PATTERN: ABNORMAL
ANA TITER: ABNORMAL
ANTI-NUCLEAR ANTIBODY (ANA): POSITIVE
PATHOLOGIST: ABNORMAL

## 2020-07-25 ENCOUNTER — HOSPITAL ENCOUNTER (OUTPATIENT)
Dept: ULTRASOUND IMAGING | Age: 55
Discharge: HOME | End: 2020-07-25
Payer: OTHER GOVERNMENT

## 2020-07-25 PROCEDURE — 76705 ECHO EXAM OF ABDOMEN: CPT

## 2020-07-28 ENCOUNTER — OFFICE VISIT (OUTPATIENT)
Dept: INTERNAL MEDICINE CLINIC | Age: 55
End: 2020-07-28
Payer: OTHER GOVERNMENT

## 2020-07-28 VITALS
OXYGEN SATURATION: 97 % | HEART RATE: 88 BPM | BODY MASS INDEX: 30.51 KG/M2 | WEIGHT: 206 LBS | DIASTOLIC BLOOD PRESSURE: 60 MMHG | SYSTOLIC BLOOD PRESSURE: 110 MMHG | HEIGHT: 69 IN | TEMPERATURE: 96.9 F

## 2020-07-28 PROBLEM — K75.81 STEATOHEPATITIS: Status: ACTIVE | Noted: 2020-07-28

## 2020-07-28 PROCEDURE — 99214 OFFICE O/P EST MOD 30 MIN: CPT | Performed by: INTERNAL MEDICINE

## 2020-07-28 PROCEDURE — 3017F COLORECTAL CA SCREEN DOC REV: CPT | Performed by: INTERNAL MEDICINE

## 2020-07-28 PROCEDURE — G8417 CALC BMI ABV UP PARAM F/U: HCPCS | Performed by: INTERNAL MEDICINE

## 2020-07-28 PROCEDURE — 1036F TOBACCO NON-USER: CPT | Performed by: INTERNAL MEDICINE

## 2020-07-28 PROCEDURE — G8427 DOCREV CUR MEDS BY ELIG CLIN: HCPCS | Performed by: INTERNAL MEDICINE

## 2020-07-28 ASSESSMENT — ENCOUNTER SYMPTOMS
RESPIRATORY NEGATIVE: 1
DIARRHEA: 0
ABDOMINAL DISTENTION: 1
NAUSEA: 0

## 2020-07-28 NOTE — ASSESSMENT & PLAN NOTE
If she eventually stopped drinking then we may be able to offer medications to help with her anxiety.

## 2020-07-28 NOTE — ASSESSMENT & PLAN NOTE
Extensive fatty liver with changes of nodularity compatible with early cirrhosis. Probably due to obesity and alcohol intake. Strongly encouraged her to stop drinking. She may be drinking where between 6-12 beers per day. Refer to GI for further evaluation of her liver and IBS-like symptoms. Also refer to dietitian for weight loss.

## 2020-07-28 NOTE — ASSESSMENT & PLAN NOTE
6-12 beers per day. Fatty liver with possible cirrhotic changes. Strongly encouraged her to stop drinking. Refer to GI.

## 2020-07-28 NOTE — ASSESSMENT & PLAN NOTE
Some difficulty getting in with cardiology. Refer to cardiac electrophysiology associated with [facility].

## 2020-07-28 NOTE — PROGRESS NOTES
SUBJECTIVE:  Patient ID: Rupali Gutierrez is an 53 y.o. female. HPI: Patient here today for the f/u of chronic problems-- see Problem List and associated comments. New issues or complaints include (also see Assessment for more details): Patient comes in today with her  for follow-up on her recent right upper quadrant ultrasound which showed a significant fatty liver with changes compatible with early cirrhosis. She admits to significant beer intake on a daily basis-she claims about 6 beers per day, but her  states that it could be double that. She had a significant drinking history in the past but also quit for a long period of time. She admits she drinks to treat anxiety. She is also having issues with her arthritic problems, including her rheumatoid arthritis. Review of Systems   Constitutional: Positive for fatigue. Negative for unexpected weight change. Respiratory: Negative. Cardiovascular: Negative. Gastrointestinal: Positive for abdominal distention. Negative for diarrhea and nausea. Musculoskeletal: Positive for arthralgias. Neurological: Negative for tremors. Psychiatric/Behavioral: The patient is nervous/anxious. OBJECTIVE:    /60 (Site: Right Upper Arm, Position: Sitting, Cuff Size: Medium Adult)   Pulse 88   Temp 96.9 °F (36.1 °C)   Ht 5' 9\" (1.753 m)   Wt 206 lb (93.4 kg)   SpO2 97%   BMI 30.42 kg/m²      Physical Exam  Constitutional:       Comments: Overweight   Eyes:      General: No scleral icterus. Cardiovascular:      Rate and Rhythm: Normal rate. Pulmonary:      Effort: Pulmonary effort is normal.   Abdominal:      General: Bowel sounds are normal. There is no distension. Skin:     Coloration: Skin is not jaundiced. Neurological:      General: No focal deficit present. Mental Status: She is alert and oriented to person, place, and time. Cranial Nerves: No cranial nerve deficit.    Psychiatric:         Attention and Perception: Attention normal.         Mood and Affect: Mood normal. Mood is not anxious or depressed. Speech: Speech normal.         Behavior: Behavior normal.         Thought Content: Thought content normal.         Cognition and Memory: Cognition normal.         Judgment: Judgment normal.         ASSESSMENT:       Encounter Diagnoses   Name Primary?  Steatohepatitis     Alcohol abuse     Adjustment disorder with anxiety     Paroxysmal atrial fibrillation (HCC)     Overweight (BMI 25.0-29. 9)        Steatohepatitis  Extensive fatty liver with changes of nodularity compatible with early cirrhosis. Probably due to obesity and alcohol intake. Strongly encouraged her to stop drinking. She may be drinking where between 6-12 beers per day. Refer to GI for further evaluation of her liver and IBS-like symptoms. Also refer to dietitian for weight loss. Alcohol abuse  6-12 beers per day. Fatty liver with possible cirrhotic changes. Strongly encouraged her to stop drinking. Refer to GI. Adjustment disorder with anxiety  If she eventually stopped drinking then we may be able to offer medications to help with her anxiety. Paroxysmal atrial fibrillation (HCC)  Some difficulty getting in with cardiology. Refer to cardiac electrophysiology associated with [facility]. Overweight (BMI 25.0-29. 9)  Refer to dietitian-she needs to lose weight due to fatty liver disease. PLAN:See ASSESSMENT for evaluation & PLAN     No orders of the defined types were placed in this encounter.    , PMH, SH and FH reviewed and noted. Recent and past labs, tests and consultsalso reviewed. Recent or new meds also reviewed. Lengthy and thorough review of patient's current problem and other potential comorbid issues affecting patient - discussion w/ patient and accompanying family members. Questions and concerns were addressed, as well as any potential treatment options.

## 2020-08-03 ENCOUNTER — TELEPHONE (OUTPATIENT)
Dept: INTERNAL MEDICINE CLINIC | Age: 55
End: 2020-08-03

## 2020-08-03 RX ORDER — LORAZEPAM 2 MG/1
TABLET ORAL
Qty: 20 TABLET | Refills: 0 | OUTPATIENT
Start: 2020-08-03 | End: 2020-08-13 | Stop reason: SDUPTHER

## 2020-08-03 RX ORDER — BUTALBITAL, ACETAMINOPHEN AND CAFFEINE 50; 325; 40 MG/1; MG/1; MG/1
1 TABLET ORAL 2 TIMES DAILY PRN
Qty: 20 TABLET | Refills: 0 | OUTPATIENT
Start: 2020-08-03 | End: 2021-02-03 | Stop reason: SDUPTHER

## 2020-08-03 NOTE — TELEPHONE ENCOUNTER
The patient is requesting a order drug test because the patient is reporting that she tested positive for Fentanyl while in the hospital, which she reports she has never taken. Patient is reporting she was discharge from [facility] today. She is reporting severe headache and anxiety.       [facility] [address] [facility], [address] [place] [address] & [address] [facility] -  [phone]

## 2020-08-03 NOTE — TELEPHONE ENCOUNTER
Pt is trying to figure out why its even showing period because she does not do drugs at all. Pt is very upset that it came back. She wants the test done. She does not want anyone to think she does. She is doubling   Her counseling sessions because she can't take it anymore. She needs something for anxiety and her headaches. Spoke to DR. Grace Rodriguez stated he would call in the ativan for the pt IF THE PATIENT STOPS DRINKING. Explained that to pt she stated that she has not had a drink since she saw Dr. Grace Rodriguez last.      Dr. Grace Rodriguez stated to call in Lorazepam 2mg   #20    should be 1/2 to 1 tab TID prn anxiety  Run oarrs  And also can do a refill on fioricet but needs a vv next week. Pt understood verified pharmacy and made appt.

## 2020-08-03 NOTE — TELEPHONE ENCOUNTER
OK to order urine drug screen - but if there was any Fentanyl in her system it would probably be gone in 1 - 2 days after any exposure.

## 2020-08-04 ENCOUNTER — OFFICE VISIT (OUTPATIENT)
Dept: CARDIOLOGY CLINIC | Age: 55
End: 2020-08-04
Payer: OTHER GOVERNMENT

## 2020-08-04 VITALS
TEMPERATURE: 96.8 F | DIASTOLIC BLOOD PRESSURE: 80 MMHG | SYSTOLIC BLOOD PRESSURE: 110 MMHG | BODY MASS INDEX: 30.13 KG/M2 | HEART RATE: 60 BPM | WEIGHT: 204 LBS

## 2020-08-04 DIAGNOSIS — R89.2 ABNORMAL DRUG SCREEN: ICD-10-CM

## 2020-08-04 PROCEDURE — G8417 CALC BMI ABV UP PARAM F/U: HCPCS | Performed by: INTERNAL MEDICINE

## 2020-08-04 PROCEDURE — 1036F TOBACCO NON-USER: CPT | Performed by: INTERNAL MEDICINE

## 2020-08-04 PROCEDURE — G8427 DOCREV CUR MEDS BY ELIG CLIN: HCPCS | Performed by: INTERNAL MEDICINE

## 2020-08-04 PROCEDURE — 3017F COLORECTAL CA SCREEN DOC REV: CPT | Performed by: INTERNAL MEDICINE

## 2020-08-04 PROCEDURE — 99214 OFFICE O/P EST MOD 30 MIN: CPT | Performed by: INTERNAL MEDICINE

## 2020-08-04 ASSESSMENT — ENCOUNTER SYMPTOMS
CHOKING: 0
COUGH: 0
SHORTNESS OF BREATH: 0
CHEST TIGHTNESS: 0

## 2020-08-04 NOTE — PROGRESS NOTES
Subjective:      Patient ID: Marybel Michel is a 53 y.o. female. Fatigue   Pertinent negatives include no chest pain, coughing or fatigue. Here for follow up afib/htn/abn ekg. Recent hosp with panic attack and marked HTN. BP since out better. No tachycardia. occ palps. No exertional.  No sycope. No pnd or orthopnea. No edema. Past Medical History:   Diagnosis Date    Alcohol abuse, in remission     Anxiety state, unspecified     Depressive disorder, not elsewhere classified     Diffuse cystic mastopathy     Extrinsic asthma, unspecified     Hypertension     Leiomyoma of uterus, unspecified     Other and unspecified ovarian cyst      Past Surgical History:   Procedure Laterality Date    CHOLECYSTECTOMY      HYSTERECTOMY       Social History     Socioeconomic History    Marital status:      Spouse name: Not on file    Number of children: Not on file    Years of education: Not on file    Highest education level: Not on file   Occupational History    Not on file   Social Needs    Financial resource strain: Not on file    Food insecurity     Worry: Not on file     Inability: Not on file    Transportation needs     Medical: Not on file     Non-medical: Not on file   Tobacco Use    Smoking status: Never Smoker    Smokeless tobacco: Never Used   Substance and Sexual Activity    Alcohol use:  Yes     Alcohol/week: 1.0 - 2.0 standard drinks     Types: 1 - 2 Glasses of wine per week     Comment: 2 drinks qd    Drug use: No    Sexual activity: Yes     Partners: Male   Lifestyle    Physical activity     Days per week: Not on file     Minutes per session: Not on file    Stress: Not on file   Relationships    Social connections     Talks on phone: Not on file     Gets together: Not on file     Attends Buddhism service: Not on file     Active member of club or organization: Not on file     Attends meetings of clubs or organizations: Not on file     Relationship status: Not on file    Intimate partner violence     Fear of current or ex partner: Not on file     Emotionally abused: Not on file     Physically abused: Not on file     Forced sexual activity: Not on file   Other Topics Concern    Not on file   Social History Narrative    Not on file       FH reviewed, denies FH cardiac issues. Vitals:    08/04/20 1423   BP: 110/80   Pulse: 60   Temp: 96.8 °F (36 °C)       Wt 204    Review of Systems   Constitutional: Negative for activity change, appetite change and fatigue. Respiratory: Negative for cough, choking, chest tightness and shortness of breath. Cardiovascular: Negative for chest pain, palpitations and leg swelling. Denies PND or orthopnea. No tachycardia or syncope. Neurological: Negative for dizziness, syncope and light-headedness. Psychiatric/Behavioral: Negative for agitation, behavioral problems and confusion. All other systems reviewed negative as done. Objective:   Physical Exam   Constitutional: She is oriented to person, place, and time. She appears well-developed and well-nourished. No distress. HENT:   Head: Normocephalic and atraumatic. Eyes: Conjunctivae and EOM are normal. Right eye exhibits no discharge. Left eye exhibits no discharge. Neck: Normal range of motion. No JVD present. Cardiovascular: Normal rate, regular rhythm, S1 normal, S2 normal and normal heart sounds. Exam reveals no gallop. No murmur heard. Pulses:       Radial pulses are 2+ on the right side and 2+ on the left side. Pulmonary/Chest: Effort normal and breath sounds normal. No respiratory distress. She has no wheezes. She has no rales. Abdominal: Soft. Bowel sounds are normal. There is no abdominal tenderness. Musculoskeletal: Normal range of motion. General: No tenderness or edema. Neurological: She is alert and oriented to person, place, and time. Skin: Skin is warm and dry. Psychiatric: She has a normal mood and affect.  Her behavior is

## 2020-08-05 ENCOUNTER — TELEPHONE (OUTPATIENT)
Dept: INTERNAL MEDICINE CLINIC | Age: 55
End: 2020-08-05

## 2020-08-05 LAB
AMPHETAMINE SCREEN, URINE: ABNORMAL
BARBITURATE SCREEN URINE: POSITIVE
BENZODIAZEPINE SCREEN, URINE: ABNORMAL
CANNABINOID SCREEN URINE: ABNORMAL
COCAINE METABOLITE SCREEN URINE: ABNORMAL
Lab: ABNORMAL
METHADONE SCREEN, URINE: ABNORMAL
OPIATE SCREEN URINE: ABNORMAL
OXYCODONE URINE: ABNORMAL
PH UA: 6.5
PHENCYCLIDINE SCREEN URINE: ABNORMAL
PROPOXYPHENE SCREEN: ABNORMAL

## 2020-08-05 NOTE — TELEPHONE ENCOUNTER
Pt asking for Ky Fitzpatrick, pt called and stated that she erased the vm by accident and is asking to be given the results again. States that she hasn't taken any Fioricet for headaches. She hasn't been taking anything but benedryl because she couldn't breathe. Please call her back at the earliest possible time.       Please advise

## 2020-08-05 NOTE — TELEPHONE ENCOUNTER
Spoke to pt she stated that she did take fioricet yesturday but didn't understand why it would be there today. Explained to her it can stay in your system longer then a day. She still didn't understand the drug screen from the hospital and began to try stating she does not do drugs.  Then stated that's all she needed to know and thanked

## 2020-08-06 RX ORDER — ESTROGENS, CONJUGATED 0.45 MG/1
TABLET, FILM COATED ORAL
Qty: 30 TABLET | Refills: 3 | Status: SHIPPED | OUTPATIENT
Start: 2020-08-06 | End: 2020-12-02

## 2020-08-13 ENCOUNTER — OFFICE VISIT (OUTPATIENT)
Dept: PRIMARY CARE CLINIC | Age: 55
End: 2020-08-13
Payer: OTHER GOVERNMENT

## 2020-08-13 ENCOUNTER — VIRTUAL VISIT (OUTPATIENT)
Dept: INTERNAL MEDICINE CLINIC | Age: 55
End: 2020-08-13
Payer: OTHER GOVERNMENT

## 2020-08-13 PROCEDURE — 99214 OFFICE O/P EST MOD 30 MIN: CPT | Performed by: INTERNAL MEDICINE

## 2020-08-13 PROCEDURE — 99211 OFF/OP EST MAY X REQ PHY/QHP: CPT | Performed by: NURSE PRACTITIONER

## 2020-08-13 PROCEDURE — 3017F COLORECTAL CA SCREEN DOC REV: CPT | Performed by: INTERNAL MEDICINE

## 2020-08-13 PROCEDURE — G8428 CUR MEDS NOT DOCUMENT: HCPCS | Performed by: NURSE PRACTITIONER

## 2020-08-13 PROCEDURE — G8427 DOCREV CUR MEDS BY ELIG CLIN: HCPCS | Performed by: INTERNAL MEDICINE

## 2020-08-13 PROCEDURE — G8417 CALC BMI ABV UP PARAM F/U: HCPCS | Performed by: NURSE PRACTITIONER

## 2020-08-13 RX ORDER — LORAZEPAM 2 MG/1
TABLET ORAL
Qty: 90 TABLET | Refills: 1 | Status: SHIPPED | OUTPATIENT
Start: 2020-08-13 | End: 2020-08-23

## 2020-08-13 ASSESSMENT — ENCOUNTER SYMPTOMS
SHORTNESS OF BREATH: 0
COUGH: 0
GASTROINTESTINAL NEGATIVE: 1
EYE DISCHARGE: 0
RHINORRHEA: 1
SORE THROAT: 0

## 2020-08-13 NOTE — ASSESSMENT & PLAN NOTE
She is now avoiding alcohol. No acetaminophen except an occasional Fioricet use. She has appointment with gastroenterology in a couple weeks.

## 2020-08-13 NOTE — ASSESSMENT & PLAN NOTE
Denies alcohol use. Lorazepam helped tremendously. Will refill Rx. Discussed use and risk of use, especially should she resume alcohol. Monitor report done. We will follow-up in the near future discussed continues to the medication versus alternatives. She continues to see her therapist, Maya Fischer, twice per week.

## 2020-08-13 NOTE — PROGRESS NOTES
SUBJECTIVE:  Patient ID: Stephanie Turner is an 53 y.o. female. HPI: Patient here today for the f/u of chronic problems-- see Problem List and associated comments. New issues or complaints include (also see Assessment for more details):      TELEHEALTH EVALUATION -- Audio/Visual (During JAKQ-88 public health emergency)    Pursuant to the emergency declaration under the [address] [place] [address] [facility] authority and the [employer] Act, this Virtual  Visit was conducted, with patient's consent, to reduce the patient's risk of exposure to COVID-19 and provide continuity of care for an established patient. Services were provided through a video synchronous discussion virtually to substitute for in-person clinic visit. Doing a video visit today for her severe anxiety. She has several family domestic issues going on. Fortunately things are going well with her . Her  is at surgery. The patient was in the emergency room several days ago with extremely elevated blood pressure. Drug test at that time was positive for fentanyl although she adamantly denies any fentanyl use. Repeat blood test was positive for barbiturate which is present in her Fioricet. Since her last visit here she denies any alcohol use whatsoever. She does have an upcoming appointment with GI in a couple weeks. She continues to go to her therapist twice a week now. We gave her some lorazepam for her extreme anxiety and to work well. She recently had a cardiology visit and her blood pressure is doing well. Her  just had surgery. He then had a positive COVID test.  He is going for retesting today. The patient is going for testing today. Both are asymptomatic. Review of Systems   Constitutional: Negative for chills and fever. HENT: Positive for congestion and rhinorrhea. Negative for sore throat. Eyes: Negative for discharge. Respiratory: Negative for cough and shortness of breath. Gastrointestinal: Negative. Neurological: Positive for headaches. Psychiatric/Behavioral: Positive for agitation and sleep disturbance. Negative for behavioral problems, dysphoric mood and suicidal ideas. The patient is nervous/anxious. OBJECTIVE:    There were no vitals taken for this visit. Physical Exam  Constitutional:       General: She is not in acute distress. Appearance: Normal appearance. She is not ill-appearing, toxic-appearing or diaphoretic. Pulmonary:      Effort: No respiratory distress. Neurological:      General: No focal deficit present. Mental Status: She is alert and oriented to person, place, and time. Cranial Nerves: No cranial nerve deficit. Psychiatric:         Attention and Perception: Attention normal.         Mood and Affect: Mood normal.         Speech: Speech normal.         Behavior: Behavior normal.         Thought Content: Thought content normal.         Cognition and Memory: Cognition normal.         Judgment: Judgment normal.         ASSESSMENT:       Encounter Diagnoses   Name Primary?  Abnormal drug screen     Anxiety     Adjustment disorder with anxiety     Alcohol abuse     Steatohepatitis     Paroxysmal atrial fibrillation (HCC)     Essential hypertension        Adjustment disorder with anxiety  Denies alcohol use. Lorazepam helped tremendously. Will refill Rx. Discussed use and risk of use, especially should she resume alcohol. Monitor report done. We will follow-up in the near future discussed continues to the medication versus alternatives. She continues to see her therapist, Laila Delgdao, twice per week. Alcohol abuse  Patient denies any use whatsoever since her last visit here. Steatohepatitis  She is now avoiding alcohol. No acetaminophen except an occasional Fioricet use. She has appointment with gastroenterology in a couple weeks.     Paroxysmal atrial

## 2020-08-13 NOTE — PROGRESS NOTES
Robson Alexis received a viral test for COVID-19. They were educated on isolation and quarantine as appropriate. For any symptoms, they were directed to seek care from their PCP, given contact information to establish with a doctor, directed to an urgent care or the emergency room.

## 2020-08-14 LAB — SARS-COV-2, NAA: NOT DETECTED

## 2020-09-02 ENCOUNTER — TELEPHONE (OUTPATIENT)
Dept: CARDIOLOGY CLINIC | Age: 55
End: 2020-09-02

## 2020-09-02 RX ORDER — ZOLPIDEM TARTRATE 10 MG/1
TABLET ORAL
Qty: 30 TABLET | OUTPATIENT
Start: 2020-09-02

## 2020-09-02 NOTE — TELEPHONE ENCOUNTER
Med refill    zolpidem (AMBIEN) 10 MG tablet     [facility] [address] [facility], [address] [place] [address]

## 2020-09-03 RX ORDER — ZOLPIDEM TARTRATE 10 MG/1
10 TABLET ORAL NIGHTLY PRN
Qty: 7 TABLET | Refills: 0 | Status: SHIPPED | OUTPATIENT
Start: 2020-09-03 | End: 2020-09-08 | Stop reason: SDUPTHER

## 2020-09-04 NOTE — TELEPHONE ENCOUNTER
Per DR. Tennille thakur to stop Eliquis 2 days prior to procedure. Letter completed.  Left message at 's office to obtain fax number

## 2020-09-08 RX ORDER — ZOLPIDEM TARTRATE 10 MG/1
10 TABLET ORAL NIGHTLY PRN
Qty: 30 TABLET | Refills: 0 | Status: SHIPPED | OUTPATIENT
Start: 2020-09-08 | End: 2020-10-09 | Stop reason: SDUPTHER

## 2020-09-08 NOTE — TELEPHONE ENCOUNTER
Med refill    zolpidem (AMBIEN) 10 MG tablet     The patient is reporting tenderness of both breast and needs to schedule a mammo but they are asking if it is a routine or diagnostic.  She is requesting orders    [place] [id number] [address] [facility], [address] [place] [address] & [facility]

## 2020-09-28 ENCOUNTER — HOSPITAL ENCOUNTER (OUTPATIENT)
Dept: WOMENS IMAGING | Age: 55
Discharge: HOME | End: 2020-09-28
Payer: OTHER GOVERNMENT

## 2020-09-28 PROCEDURE — 77063 BREAST TOMOSYNTHESIS BI: CPT

## 2020-09-30 ENCOUNTER — OFFICE VISIT (OUTPATIENT)
Dept: ENT CLINIC | Age: 55
End: 2020-09-30
Payer: OTHER GOVERNMENT

## 2020-09-30 VITALS — DIASTOLIC BLOOD PRESSURE: 82 MMHG | SYSTOLIC BLOOD PRESSURE: 122 MMHG | HEART RATE: 70 BPM | TEMPERATURE: 97.1 F

## 2020-09-30 PROCEDURE — 99213 OFFICE O/P EST LOW 20 MIN: CPT | Performed by: OTOLARYNGOLOGY

## 2020-09-30 PROCEDURE — 3017F COLORECTAL CA SCREEN DOC REV: CPT | Performed by: OTOLARYNGOLOGY

## 2020-09-30 PROCEDURE — 1036F TOBACCO NON-USER: CPT | Performed by: OTOLARYNGOLOGY

## 2020-09-30 PROCEDURE — G8427 DOCREV CUR MEDS BY ELIG CLIN: HCPCS | Performed by: OTOLARYNGOLOGY

## 2020-09-30 PROCEDURE — 96372 THER/PROPH/DIAG INJ SC/IM: CPT | Performed by: OTOLARYNGOLOGY

## 2020-09-30 PROCEDURE — G8417 CALC BMI ABV UP PARAM F/U: HCPCS | Performed by: OTOLARYNGOLOGY

## 2020-09-30 RX ORDER — METHYLPREDNISOLONE ACETATE 40 MG/ML
40 INJECTION, SUSPENSION INTRA-ARTICULAR; INTRALESIONAL; INTRAMUSCULAR; SOFT TISSUE ONCE
Status: COMPLETED | OUTPATIENT
Start: 2020-09-30 | End: 2020-09-30

## 2020-09-30 RX ORDER — FAMOTIDINE 40 MG/1
TABLET, FILM COATED ORAL
COMMUNITY
Start: 2020-09-02 | End: 2021-10-18 | Stop reason: ALTCHOICE

## 2020-09-30 RX ADMIN — METHYLPREDNISOLONE ACETATE 40 MG: 40 INJECTION, SUSPENSION INTRA-ARTICULAR; INTRALESIONAL; INTRAMUSCULAR; SOFT TISSUE at 11:14

## 2020-09-30 NOTE — PROGRESS NOTES
Over the course the past several months, the patient has been having an increase in sinus congestion with pressure sensation in both of her ears and for for occasional long with redness of her eyes. He has been on Astelin as well as Allegra and occasionally Benadryl with only slight improvement. She has had no fever. She is had some tingling in her left ear associated with some slight decrease in hearing. The decrease is only been on the left side. She is using CPAP. She has had no fever. She does not smoke. She is on blood pressure medication which precludes her getting allergy shots despite the fact that she did have some testing done previously. She does have a history of asthma for which she is undergoing treatment. Currently, she has had no distress. Ear examination reveals normal-appearing tympanic membranes and ear canals on both sides. The ear canals are clear. Oral examination is unremarkable. The neck is free of any adenopathy, mass, thyroid enlargement. There is no obvious conjunctivitis at the present time. Nasal mucosa treated with cotton pledgets  impregnated with Afrin and lidocaine placed in middle meatuses against turbinates. Pledgets left in place for five minutes and removed enabling enhanced visualization. The exam revealed positive edema of mucosa. it was negative for erythema indicative of infection. There was not evidence of deviation of the septum which was not significant. There were not polyps present. .There were not other masses present. The turbinates were not enlarged beyond normal.  Findings are consistent with allergic sinusitis. I have suggested that she continue her current therapy but we add a Depo-Medrol injection. If she requires it, Pred forte eyedrops would be prescribed as well but at this time she does not particularly need that. She will contact me in regards to her response to current treatment.

## 2020-10-09 ENCOUNTER — TELEPHONE (OUTPATIENT)
Dept: INTERNAL MEDICINE CLINIC | Age: 55
End: 2020-10-09

## 2020-10-09 RX ORDER — ZOLPIDEM TARTRATE 10 MG/1
10 TABLET ORAL NIGHTLY PRN
Qty: 30 TABLET | Refills: 1 | Status: SHIPPED | OUTPATIENT
Start: 2020-10-09 | End: 2020-12-09 | Stop reason: SDUPTHER

## 2020-10-09 NOTE — TELEPHONE ENCOUNTER
Pt. States she needs [place] refilled at   [facility] [address] [facility], [address] [place] [address] & [facility]

## 2020-10-19 ENCOUNTER — TELEPHONE (OUTPATIENT)
Dept: ENT CLINIC | Age: 55
End: 2020-10-19

## 2020-10-19 RX ORDER — METHYLPREDNISOLONE 4 MG/1
4 TABLET ORAL SEE ADMIN INSTRUCTIONS
Qty: 1 KIT | Refills: 0 | Status: SHIPPED | OUTPATIENT
Start: 2020-10-19 | End: 2021-02-04

## 2020-10-19 NOTE — TELEPHONE ENCOUNTER
Pt called and states she's not feeling any better. She was in the office on 9-30-20 and was given a shot for her ears. Whats the next step?   CB# [phone]

## 2020-11-18 RX ORDER — POTASSIUM CHLORIDE 20 MEQ/1
TABLET, EXTENDED RELEASE ORAL
Qty: 60 TABLET | Refills: 5 | Status: SHIPPED | OUTPATIENT
Start: 2020-11-18 | End: 2021-05-04

## 2020-11-30 ENCOUNTER — OFFICE VISIT (OUTPATIENT)
Dept: PRIMARY CARE CLINIC | Age: 55
End: 2020-11-30
Payer: OTHER GOVERNMENT

## 2020-11-30 PROCEDURE — 99211 OFF/OP EST MAY X REQ PHY/QHP: CPT | Performed by: NURSE PRACTITIONER

## 2020-11-30 PROCEDURE — G8428 CUR MEDS NOT DOCUMENT: HCPCS | Performed by: NURSE PRACTITIONER

## 2020-11-30 PROCEDURE — G8417 CALC BMI ABV UP PARAM F/U: HCPCS | Performed by: NURSE PRACTITIONER

## 2020-11-30 NOTE — PROGRESS NOTES
Rashi Ashraf received a viral test for COVID-19. They were educated on isolation and quarantine as appropriate. For any symptoms, they were directed to seek care from their PCP, given contact information to establish with a doctor, directed to an urgent care or the emergency room.

## 2020-12-01 LAB — SARS-COV-2, NAA: NOT DETECTED

## 2020-12-02 RX ORDER — ESTROGENS, CONJUGATED 0.45 MG/1
TABLET, FILM COATED ORAL
Qty: 30 TABLET | Refills: 3 | Status: SHIPPED | OUTPATIENT
Start: 2020-12-02 | End: 2021-04-05

## 2020-12-09 ENCOUNTER — TELEPHONE (OUTPATIENT)
Dept: INTERNAL MEDICINE CLINIC | Age: 55
End: 2020-12-09

## 2020-12-09 RX ORDER — OMEPRAZOLE 40 MG/1
CAPSULE, DELAYED RELEASE ORAL
Qty: 30 CAPSULE | Refills: 5 | Status: SHIPPED | OUTPATIENT
Start: 2020-12-09 | End: 2021-06-01

## 2020-12-09 RX ORDER — ZOLPIDEM TARTRATE 10 MG/1
10 TABLET ORAL NIGHTLY PRN
Qty: 30 TABLET | Refills: 1 | Status: CANCELLED | OUTPATIENT
Start: 2020-12-09 | End: 2021-01-08

## 2020-12-09 RX ORDER — TRAZODONE HYDROCHLORIDE 100 MG/1
TABLET ORAL
Qty: 60 TABLET | Refills: 5 | Status: SHIPPED | OUTPATIENT
Start: 2020-12-09 | End: 2021-06-01

## 2020-12-09 RX ORDER — ZOLPIDEM TARTRATE 10 MG/1
10 TABLET ORAL NIGHTLY PRN
Qty: 30 TABLET | Refills: 1 | Status: SHIPPED | OUTPATIENT
Start: 2020-12-09 | End: 2021-02-08 | Stop reason: SDUPTHER

## 2020-12-09 NOTE — TELEPHONE ENCOUNTER
Patient is requesting refill on medication   zolpidem (AMBIEN) 10 MG tablet [[id number]] and she is scheduled for a appoinment 12/28/20.  Please Advice

## 2020-12-14 RX ORDER — FLECAINIDE ACETATE 50 MG/1
50 TABLET ORAL 2 TIMES DAILY
Qty: 180 TABLET | Refills: 3 | Status: SHIPPED | OUTPATIENT
Start: 2020-12-14 | End: 2021-12-15 | Stop reason: SDUPTHER

## 2020-12-14 NOTE — TELEPHONE ENCOUNTER
Requested Prescriptions     Pending Prescriptions Disp Refills    flecainide (TAMBOCOR) 50 MG tablet 180 tablet 3     Sig: Take 1 tablet by mouth 2 times daily                Last Office Visit: 8/4/2020     Next Office Visit: 12/23/2020         Last Labs:07/14/2020

## 2020-12-15 NOTE — PROGRESS NOTES
Medical Nutrition Therapy    Obey Elizabeth  December 15, 2020      Reason for visit Weight concerns  Patient Care Team:  Susan Phalen, MD as PCP - General  Susan Phalen, MD as PCP - [facility] Empaneled Provider      ASSESSMENT/PLAN:   NUTRITION DIAGNOSIS    #1 Problem: Overweight/Obesity (NC-3.3)  Related to: Excessive energy intake or physical inactivity  As Evidenced by: BMI more than normative standard for age and sex (BMI=30.13)         #2 Problem: Altered Nutrition-Related Laboratory Values (NC-2.2)  Related to: Cardiac/Lipid status  As Evidenced by: Elevated serum LDL Cholesterol     NUTRITION INTERVENTION  Nutrition Prescription: Plan meals to follow Plate Guide, aiming for three servings of vegetables, fruits, and whole grains daily. Interventions:  PLATE PLANNER  HUNGER MONITORING    OUTCOME/INDICATORS TO EVALUATE:     Patient Instructions   BEHAVIOR GOALS    When to eat: Try to spread food intake evenly throughout the day, over a 14 to 16 hour period. What to eat: Plan meals to follow Plate Guide. How much to eat: Decrease portion sizes by a small amount, and listen to your body's signal for satisfaction to avoid overeating    Physical Activity:Aim for some physical activity daily within your tolerance                       Patient Active Problem List   Diagnosis    Asthma    Diffuse cystic mastopathy    Gestational diabetes    Essential hypertension    Chronic insomnia    Alcohol abuse    Adjustment disorder with anxiety    Menopausal symptoms    GERD (gastroesophageal reflux disease)    Overweight (BMI 25.0-29. 9)    Obstructive sleep apnea syndrome    Numbness of toes    Popliteal cyst, left    Paroxysmal atrial fibrillation (HCC)    Blepharitis    Environmental allergies    Other fatigue    Steatohepatitis       Current Outpatient Medications   Medication Sig Dispense Refill    flecainide (TAMBOCOR) 50 MG tablet Take 1 tablet by mouth 2 times daily 180 tablet 3  omeprazole (PRILOSEC) 40 MG delayed release capsule TAKE 1 CAPSULE BY MOUTH EVERY DAY 30 capsule 5    traZODone (DESYREL) 100 MG tablet TAKE 2 TABLETS BY MOUTH AT BEDTIME 60 tablet 5    zolpidem (AMBIEN) 10 MG tablet Take 1 tablet by mouth nightly as needed for Sleep. 30 tablet 1    PREMARIN 0.45 MG tablet TAKE 1 TABLET BY MOUTH DAILY 30 tablet 3    potassium chloride (KLOR-CON M) 20 MEQ extended release tablet TAKE 1 TABLET BY MOUTH TWICE DAILY 60 tablet 5    metoprolol tartrate (LOPRESSOR) 25 MG tablet TAKE 2 TABLETS(50 MG) BY MOUTH TWICE DAILY 360 tablet 3    methylPREDNISolone (MEDROL, BERNICE,) 4 MG tablet Take 1 tablet by mouth See Admin Instructions Take by mouth. 1 kit 0    famotidine (PEPCID) 40 MG tablet TK 1 T PO D      butalbital-acetaminophen-caffeine (FIORICET, ESGIC) -40 MG per tablet Take 1 tablet by mouth 2 times daily as needed for Headaches 20 tablet 0    albuterol sulfate  (90 Base) MCG/ACT inhaler INHALE 2 PUFFS EVERY 4 HOURS WHILE AWAKE FOR 7-10 DAYS, THEN EVERY 6 HOURS AS NEEDED FOR WHEEZING 6.7 g 1    ELIQUIS 5 MG TABS tablet TAKE 1 TABLET BY MOUTH TWICE DAILY 180 tablet 3    fluticasone-vilanterol (BREO ELLIPTA) 100-25 MCG/INH AEPB inhaler Inhale 1 puff into the lungs daily 1 each 5    torsemide (DEMADEX) 20 MG tablet Take 1 daily as needed for water retention 90 tablet 1    Crisaborole (EUCRISA) 2 % OINT Apply BID 60 g 0    ketotifen (ZADITOR) 0.025 % ophthalmic solution INSTILL ONE DROP IN THE MORNING AND ONE DROP AT NIGHT INTO BOTH EYES  6    prednisoLONE acetate (PRED FORTE) 1 % ophthalmic suspension INSTILL ONE DROP IN BOTH EYES TID FOR 7 DAYS  1    hydrocortisone (ANUSOL-HC) 2.5 % rectal cream Place rectally 4 times daily prn 28 g 2    diphenhydrAMINE (BENADRYL) 25 MG capsule Take 25 mg by mouth every 4 hours as needed for Itching (take 1-2 tabs PRN)       No current facility-administered medications for this visit.           NUTRITION ASSESSMENT Biochemical Data, Medical Tests and Procedures:    Lab Results   Component Value Date    LABA1C 5.5 07/14/2020     Lab Results   Component Value Date    .2 07/14/2020       Lab Results   Component Value Date    CHOL 211 (H) 07/05/2018    CHOL 197 02/09/2011     Lab Results   Component Value Date    TRIG 85 07/05/2018    TRIG 54 02/09/2011     Lab Results   Component Value Date    HDL 66 (H) 07/05/2018    HDL 74 02/09/2011     Lab Results   Component Value Date    LDLCALC 128 (H) 07/05/2018    LDLCALC 112 (H) 02/09/2011     Lab Results   Component Value Date    LABVLDL 17 07/05/2018    LABVLDL 11 02/09/2011     No results found for: CHOLHDLRATIO    Lab Results   Component Value Date    WBC 6.0 07/14/2020    HGB 15.6 07/14/2020    HCT 45.7 07/14/2020    MCV 95.1 07/14/2020     07/14/2020     Lab Results   Component Value Date    CREATININE 0.8 07/14/2020    BUN 16 07/14/2020     (L) 07/14/2020    K 4.1 07/14/2020    CL 97 (L) 07/14/2020    CO2 25 07/14/2020       Anthropometric Measurements: Wt Readings from Last 3 Encounters:   08/04/20 204 lb (92.5 kg)   07/28/20 206 lb (93.4 kg)   07/14/20 204 lb 12.8 oz (92.9 kg)      BMI Readings from Last 3 Encounters:   08/04/20 30.13 kg/m²   07/28/20 30.42 kg/m²   07/14/20 30.24 kg/m²   In hospital--lost 20 lb in January  Recuperating for 8 months, gained 24 lb  Patient's stated goal weight: 170 lb  7% Weight loss goal weight: 190 lb (14 lb reduction)    Physical Activity Assessment:  Physical activity: limited  Obstacles to activity: RA back pain prevents exercise, and beta blockers used for HTN decrease energy levels    Sleep: poor quality     Food and Nutrition History:   Number of people in household: 2 spouse does shopping, patient does cooking (spouse has T2DM)  Frequency of Meals Eaten away from home:rarely  Food Availability Problems  Within the past 12 months, have you worried that your food would run out before you got money to buy more? No Within the past 12 months, has the food you bought not lasted till the end of the month and you didn't have money to get more?  No  Beverage consumption: iced tea with sugar sup, diet cranberry marimar, diet coke, water  Alcohol consumption: none due to GERD  Usual Food consumption: GERD keeps her from eating spicy foods and drinking carbonated beverages     FOOD RECALL  Up at 12:30p    First meal--Usual time: 12:30 - 1:30p  Today: chacko, egg, cream cheese on white bread  Different Day: packet of flavored oatmeal    Snack  none    Second meal--Usual time: 7:30p  Recent: leftover lasagna \"couple pieces\"  Different Day: only vegetables she eats are salads    Snack  None     Third meal--Usual time: none    Motivational Interviewing    Follow Up: one month    Referring Provider: Graham Adorno MD  Time spent with patient: 60 minutes

## 2020-12-16 ENCOUNTER — VIRTUAL VISIT (OUTPATIENT)
Dept: INTERNAL MEDICINE CLINIC | Age: 55
End: 2020-12-16

## 2020-12-16 NOTE — PATIENT INSTRUCTIONS
BEHAVIOR GOALS    When to eat: Try to spread food intake evenly throughout the day, over a 14 to 16 hour period. What to eat: Plan meals to follow Plate Guide.     How much to eat: Decrease portion sizes by a small amount, and listen to your body's signal for satisfaction to avoid overeating    Physical Activity:Aim for some physical activity daily within your tolerance

## 2020-12-23 ENCOUNTER — VIRTUAL VISIT (OUTPATIENT)
Dept: CARDIOLOGY CLINIC | Age: 55
End: 2020-12-23

## 2020-12-23 RX ORDER — LORAZEPAM 2 MG/1
TABLET ORAL
COMMUNITY
Start: 2020-11-06 | End: 2020-12-28

## 2020-12-23 ASSESSMENT — ENCOUNTER SYMPTOMS
SHORTNESS OF BREATH: 0
COUGH: 0
CHEST TIGHTNESS: 0
CHOKING: 0

## 2020-12-23 NOTE — PROGRESS NOTES
Attends meetings of clubs or organizations: Not on file     Relationship status: Not on file    Intimate partner violence     Fear of current or ex partner: Not on file     Emotionally abused: Not on file     Physically abused: Not on file     Forced sexual activity: Not on file   Other Topics Concern    Not on file   Social History Narrative    Not on file       FH reviewed, denies FH cardiac issues. BP- unable    Wt 204    Review of Systems   Constitutional: Negative for activity change, appetite change and fatigue. Respiratory: Negative for cough, choking, chest tightness and shortness of breath. Cardiovascular: Negative for chest pain, palpitations and leg swelling. Denies PND or orthopnea. No tachycardia or syncope. Neurological: Negative for dizziness, syncope and light-headedness. Psychiatric/Behavioral: Negative for agitation, behavioral problems and confusion. All other systems reviewed negative as done. Assessment:       Diagnosis Orders   1. Essential hypertension     2. Paroxysmal atrial fibrillation (HCC)     3. Abnormal EKG              Plan:      Requested and agreed to phone visit. CV stable. Hosp for panic/bp. Lots of stress. BP good. Rhythm  Stable. Continue lopressor 100 mg bid. Reviewed outside records 10/17 at [facility]. (Head CT negative. CTPA negative for PE. Echo showing normal LV function mild TR with RVSP 36. Myoview  negative for ischemia by scan.) GI/ Liver issues. Negative GI evaluation. Follow up 3 months.

## 2020-12-28 ENCOUNTER — VIRTUAL VISIT (OUTPATIENT)
Dept: INTERNAL MEDICINE CLINIC | Age: 55
End: 2020-12-28
Payer: MEDICARE

## 2020-12-28 ENCOUNTER — TELEPHONE (OUTPATIENT)
Dept: INTERNAL MEDICINE CLINIC | Age: 55
End: 2020-12-28

## 2020-12-28 PROBLEM — F98.8 ATTENTION DEFICIT DISORDER (ADD): Status: ACTIVE | Noted: 2020-12-28

## 2020-12-28 PROCEDURE — 3017F COLORECTAL CA SCREEN DOC REV: CPT | Performed by: INTERNAL MEDICINE

## 2020-12-28 PROCEDURE — 99214 OFFICE O/P EST MOD 30 MIN: CPT | Performed by: INTERNAL MEDICINE

## 2020-12-28 PROCEDURE — G8427 DOCREV CUR MEDS BY ELIG CLIN: HCPCS | Performed by: INTERNAL MEDICINE

## 2020-12-28 RX ORDER — LORAZEPAM 2 MG/1
TABLET ORAL
Qty: 90 TABLET | Refills: 1 | Status: SHIPPED | OUTPATIENT
Start: 2020-12-28 | End: 2021-02-26 | Stop reason: SDUPTHER

## 2020-12-28 ASSESSMENT — ENCOUNTER SYMPTOMS
SORE THROAT: 0
SHORTNESS OF BREATH: 0
COUGH: 0

## 2020-12-28 NOTE — PROGRESS NOTES
SUBJECTIVE:  Patient ID: Katie Dennis is an 63 y.o. female. HPI: Patient here today for the f/u of chronic problems-- see Problem List and associated comments. New issues or complaints include (also see Assessment for more details):      TELEHEALTH EVALUATION -- Audio/Visual (During LXVAK-52 public health emergency)    Pursuant to the emergency declaration under the [address] [place] [address] authority and the [employer] Act, this Virtual  Visit was conducted, with patient's consent, to reduce the patient's risk of exposure to COVID-19 and provide continuity of care for an established patient. Services were provided through a video synchronous discussion virtually to substitute for in-person clinic visit. Patient doing video visit today for refill on her zolpidem and lorazepam.  She continues to do well with both medications. She was hoping at some point to retry her ADHD medications that she feels like her ability to concentrate and function has been reduced tremendously without it. However she did forget to check with the cardiologist to see if it would be okay with her history of paroxysmal atrial fibrillation. She has a low-grade respiratory infection at this time. She believes she caught something from babysitting her grandchildren. She has been Covid tested -3 times in the recent past.    Review of Systems   Constitutional: Positive for fatigue. Negative for fever. HENT: Positive for congestion and postnasal drip. Negative for sore throat. Respiratory: Negative for cough and shortness of breath. Cardiovascular: Negative for chest pain and palpitations. Neurological: Negative for tremors and seizures. Psychiatric/Behavioral: Positive for decreased concentration and sleep disturbance. Negative for dysphoric mood. The patient is nervous/anxious.         OBJECTIVE:    There were no vitals taken for this visit. Physical Exam  Constitutional:       Appearance: Normal appearance. She is not ill-appearing. Pulmonary:      Effort: Pulmonary effort is normal. No respiratory distress. Skin:     Coloration: Skin is not pale. Neurological:      General: No focal deficit present. Mental Status: She is alert and oriented to person, place, and time. Psychiatric:         Attention and Perception: Attention normal.         Mood and Affect: Mood normal.         Speech: Speech normal.         Behavior: Behavior normal.         Thought Content: Thought content normal.         Cognition and Memory: Cognition normal.         Judgment: Judgment normal.         ASSESSMENT:       Encounter Diagnoses   Name Primary?  Abnormal drug screen     Anxiety     Attention deficit disorder (ADD) without hyperactivity     Chronic insomnia     Adjustment disorder with anxiety     Paroxysmal atrial fibrillation (HCC)        Attention deficit disorder (ADD)  Patient is hoping to go back on her Adderall from past years. She feels scatterbrained and is not completing tasks like she did in the past.  She is already on lorazepam.  She needs clearance from her cardiologist due to her history of paroxysmal atrial fibrillation. Chronic insomnia  Monitor report done. Continue zolpidem. Advised to withhold Benadryl at [facility] be having a hangover effect in the morning. Adjustment disorder with anxiety  Monitor report done. Refill lorazepam.    Paroxysmal atrial fibrillation ([address]  Status post cardiology evaluation. She is due again in 3 months. She needs to get clearance from her cardiologist for Adderall use prior to any prescription. PLAN:See ASSESSMENT for evaluation & PLAN     No orders of the defined types were placed in this encounter.    , PMH, SH and FH reviewed and noted. Recent and past labs, tests and consultsalso reviewed. Recent or new meds also reviewed.

## 2020-12-28 NOTE — ASSESSMENT & PLAN NOTE
Patient is hoping to go back on her Adderall from past years. She feels scatterbrained and is not completing tasks like she did in the past.  She is already on lorazepam.  She needs clearance from her cardiologist due to her history of paroxysmal atrial fibrillation.

## 2020-12-28 NOTE — ASSESSMENT & PLAN NOTE
Monitor report done. Continue zolpidem. Advised to withhold Benadryl at [facility] be having a hangover effect in the morning.

## 2021-01-20 ENCOUNTER — VIRTUAL VISIT (OUTPATIENT)
Dept: INTERNAL MEDICINE CLINIC | Age: 56
End: 2021-01-20

## 2021-01-20 DIAGNOSIS — E66.3 OVERWEIGHT (BMI 25.0-29.9): Primary | ICD-10-CM

## 2021-01-20 NOTE — PROGRESS NOTES
Medical Nutrition Therapy Follow Up  Patient Care Team:  Alberto Reynolds MD as PCP - General  Alberto Reynolds MD as PCP - [facility]an[facility]    Reason for visit: one month f/u for weight concerns  Patient self-assessment of progress: \"I'm having a bad couple of days--not feeling well, don't even feel like eating\"        ASSESSMENT/PLAN:   NUTRITION DIAGNOSIS    #1 Problem: Overweight/Obesity (NC-3.3)  Related to: Excessive energy intake or physical inactivity  As Evidenced by: BMI more than normative standard for age and sex (BMI=30.13)           #2 Problem: Altered Nutrition-Related Laboratory Values (NC-2.2)  Related to: Cardiac/Lipid status  As Evidenced by: Elevated serum LDL Cholesterol           NUTRITION INTERVENTION  Nutrition Prescription: Plan meals to follow Plate Guide, aiming for three servings of vegetables, fruits, and whole grains daily. High soluble fiber    Interventions:  Problem solving      NUTRITION MONITORING AND EVALUATION  5=always  4 = most of the time   3 = half the days  2 = sometimes  1 = hasn't started  Indicator/Goal Criteria   #1  Eat at regular intervals  #1 1/5   #2  Follow plate guide, monitor satisfaction levels to avoid overeating #2 2/5   #3  pysical activity as tolerated #3 3/5          Patient Active Problem List   Diagnosis    Asthma    Diffuse cystic mastopathy    Gestational diabetes    Essential hypertension    Chronic insomnia    Alcohol abuse    Adjustment disorder with anxiety    Menopausal symptoms    GERD (gastroesophageal reflux disease)    Overweight (BMI 25.0-29. 9)    Obstructive sleep apnea syndrome    Numbness of toes    Popliteal cyst, left    Paroxysmal atrial fibrillation (HCC)    Blepharitis    Environmental allergies    Other fatigue    Steatohepatitis    Attention deficit disorder (ADD)       Current Outpatient Medications   Medication Sig Dispense Refill  flecainide (TAMBOCOR) 50 MG tablet Take 1 tablet by mouth 2 times daily 180 tablet 3    omeprazole (PRILOSEC) 40 MG delayed release capsule TAKE 1 CAPSULE BY MOUTH EVERY DAY 30 capsule 5    traZODone (DESYREL) 100 MG tablet TAKE 2 TABLETS BY MOUTH AT BEDTIME 60 tablet 5    zolpidem (AMBIEN) 10 MG tablet Take 1 tablet by mouth nightly as needed for Sleep. 30 tablet 1    PREMARIN 0.45 MG tablet TAKE 1 TABLET BY MOUTH DAILY 30 tablet 3    potassium chloride (KLOR-CON M) 20 MEQ extended release tablet TAKE 1 TABLET BY MOUTH TWICE DAILY 60 tablet 5    metoprolol tartrate (LOPRESSOR) 25 MG tablet TAKE 2 TABLETS(50 MG) BY MOUTH TWICE DAILY 360 tablet 3    methylPREDNISolone (MEDROL, BERNICE,) 4 MG tablet Take 1 tablet by mouth See Admin Instructions Take by mouth.  1 kit 0    famotidine (PEPCID) 40 MG tablet TK 1 T PO D      butalbital-acetaminophen-caffeine (FIORICET, ESGIC) -40 MG per tablet Take 1 tablet by mouth 2 times daily as needed for Headaches 20 tablet 0    albuterol sulfate  (90 Base) MCG/ACT inhaler INHALE 2 PUFFS EVERY 4 HOURS WHILE AWAKE FOR 7-10 DAYS, THEN EVERY 6 HOURS AS NEEDED FOR WHEEZING 6.7 g 1    ELIQUIS 5 MG TABS tablet TAKE 1 TABLET BY MOUTH TWICE DAILY 180 tablet 3    fluticasone-vilanterol (BREO ELLIPTA) 100-25 MCG/INH AEPB inhaler Inhale 1 puff into the lungs daily 1 each 5    torsemide (DEMADEX) 20 MG tablet Take 1 daily as needed for water retention 90 tablet 1    Crisaborole (EUCRISA) 2 % OINT Apply BID 60 g 0    ketotifen (ZADITOR) 0.025 % ophthalmic solution INSTILL ONE DROP IN THE MORNING AND ONE DROP AT NIGHT INTO BOTH EYES  6    prednisoLONE acetate (PRED FORTE) 1 % ophthalmic suspension INSTILL ONE DROP IN BOTH EYES TID FOR 7 DAYS  1    hydrocortisone (ANUSOL-HC) 2.5 % rectal cream Place rectally 4 times daily prn 28 g 2    diphenhydrAMINE (BENADRYL) 25 MG capsule Take 25 mg by mouth every 4 hours as needed for Itching (take 1-2 tabs PRN)

## 2021-01-26 ENCOUNTER — TELEPHONE (OUTPATIENT)
Dept: CARDIOLOGY CLINIC | Age: 56
End: 2021-01-26

## 2021-01-26 NOTE — TELEPHONE ENCOUNTER
The patient called stating she was in Afib last night 01/25/21 for at least 2 hours. The patient states she feels very fatigue and SOB. The patient's B/P 132/82 did not get HR. Please call the patient back at [phone] to advise.      The patient has an appointment on 01/28/21

## 2021-01-27 NOTE — TELEPHONE ENCOUNTER
Called pt to check on her symtoms I  spoke to her  he stated she is sleeping and will have her return call when she wakes up.

## 2021-01-27 NOTE — TELEPHONE ENCOUNTER
Spoke to pt she stated that she has not has any more episodes and will see  on 01/28/2021 during her follow up appoitment

## 2021-01-28 ENCOUNTER — OFFICE VISIT (OUTPATIENT)
Dept: CARDIOLOGY CLINIC | Age: 56
End: 2021-01-28
Payer: OTHER GOVERNMENT

## 2021-01-28 VITALS
WEIGHT: 214 LBS | SYSTOLIC BLOOD PRESSURE: 124 MMHG | DIASTOLIC BLOOD PRESSURE: 80 MMHG | HEART RATE: 76 BPM | BODY MASS INDEX: 31.6 KG/M2

## 2021-01-28 DIAGNOSIS — I48.0 PAROXYSMAL ATRIAL FIBRILLATION (HCC): Primary | ICD-10-CM

## 2021-01-28 DIAGNOSIS — R94.31 ABNORMAL EKG: ICD-10-CM

## 2021-01-28 DIAGNOSIS — I10 ESSENTIAL HYPERTENSION: ICD-10-CM

## 2021-01-28 PROCEDURE — G8484 FLU IMMUNIZE NO ADMIN: HCPCS | Performed by: INTERNAL MEDICINE

## 2021-01-28 PROCEDURE — 1036F TOBACCO NON-USER: CPT | Performed by: INTERNAL MEDICINE

## 2021-01-28 PROCEDURE — G8417 CALC BMI ABV UP PARAM F/U: HCPCS | Performed by: INTERNAL MEDICINE

## 2021-01-28 PROCEDURE — G8427 DOCREV CUR MEDS BY ELIG CLIN: HCPCS | Performed by: INTERNAL MEDICINE

## 2021-01-28 PROCEDURE — 99214 OFFICE O/P EST MOD 30 MIN: CPT | Performed by: INTERNAL MEDICINE

## 2021-01-28 PROCEDURE — 93000 ELECTROCARDIOGRAM COMPLETE: CPT | Performed by: INTERNAL MEDICINE

## 2021-01-28 PROCEDURE — 3017F COLORECTAL CA SCREEN DOC REV: CPT | Performed by: INTERNAL MEDICINE

## 2021-01-28 ASSESSMENT — ENCOUNTER SYMPTOMS
COUGH: 0
CHOKING: 0
CHEST TIGHTNESS: 0
SHORTNESS OF BREATH: 0

## 2021-01-28 NOTE — PROGRESS NOTES
Subjective:      Patient ID: Jazmin De La Torre is a 63 y.o. female. Fatigue  Pertinent negatives include no chest pain, coughing or fatigue. Here for follow up afib/htn/abn ekg. Felt she was in afib 2 days ago. Lasted 8 hrs. Tired yesterday. Notes edema. No exertional.  No syncope. No pnd or orthopnea. Fatigued due to taking care of grandson. Son in [address]. Parents with covid. No fevers. No cough. Breathing same. Past Medical History:   Diagnosis Date    Alcohol abuse, in remission     Anxiety state, unspecified     Depressive disorder, not elsewhere classified     Diffuse cystic mastopathy     Extrinsic asthma, unspecified     Hypertension     Leiomyoma of uterus, unspecified     Other and unspecified ovarian cyst      Past Surgical History:   Procedure Laterality Date    CHOLECYSTECTOMY      HYSTERECTOMY       Social History     Socioeconomic History    Marital status:      Spouse name: Not on file    Number of children: Not on file    Years of education: Not on file    Highest education level: Not on file   Occupational History    Not on file   Social Needs    Financial resource strain: Not on file    Food insecurity     Worry: Not on file     Inability: Not on file    Transportation needs     Medical: Not on file     Non-medical: Not on file   Tobacco Use    Smoking status: Never Smoker    Smokeless tobacco: Never Used   Substance and Sexual Activity    Alcohol use:  Yes     Alcohol/week: 1.0 - 2.0 standard drinks     Types: 1 - 2 Glasses of wine per week     Comment: 2 drinks qd    Drug use: No    Sexual activity: Yes     Partners: Male   Lifestyle    Physical activity     Days per week: Not on file     Minutes per session: Not on file    Stress: Not on file   Relationships    Social connections     Talks on phone: Not on file     Gets together: Not on file     Attends Jain service: Not on file     Active member of club or organization: Not on file Attends meetings of clubs or organizations: Not on file     Relationship status: Not on file    Intimate partner violence     Fear of current or ex partner: Not on file     Emotionally abused: Not on file     Physically abused: Not on file     Forced sexual activity: Not on file   Other Topics Concern    Not on file   Social History Narrative    Not on file       FH reviewed, denies FH cardiac issues. Vitals:    01/28/21 1039   BP: 124/80   Pulse: 76       Wt 204    Review of Systems   Constitutional: Negative for activity change, appetite change and fatigue. Respiratory: Negative for cough, choking, chest tightness and shortness of breath. Cardiovascular: Negative for chest pain, palpitations and leg swelling. Denies PND or orthopnea. No tachycardia or syncope. Neurological: Negative for dizziness, syncope and light-headedness. Psychiatric/Behavioral: Negative for agitation, behavioral problems and confusion. All other systems reviewed negative as done. Objective:   Physical Exam   Constitutional: She is oriented to person, place, and time. She appears well-developed and well-nourished. No distress. HENT:   Head: Normocephalic and atraumatic. Eyes: Conjunctivae and EOM are normal. Right eye exhibits no discharge. Left eye exhibits no discharge. Neck: Normal range of motion. No JVD present. Cardiovascular: Normal rate, regular rhythm, S1 normal, S2 normal and normal heart sounds. Exam reveals no gallop. No murmur heard. Pulses:       Radial pulses are 2+ on the right side and 2+ on the left side. Pulmonary/Chest: Effort normal and breath sounds normal. No respiratory distress. She has no wheezes. She has no rales. Abdominal: Soft. Bowel sounds are normal. There is no abdominal tenderness. Musculoskeletal: Normal range of motion. General: No tenderness or edema. Neurological: She is alert and oriented to person, place, and time. Skin: Skin is warm and dry. Psychiatric: She has a normal mood and affect. Her behavior is normal. Thought content normal.       Assessment:       Diagnosis Orders   1. Paroxysmal atrial fibrillation (HCC)  EKG 12 Lead   2. Essential hypertension     3. Abnormal EKG              Plan:      CV stable. [place] she was in afib. EKG today shows NSR, Minor NSSTTW changes. Continue lopressor 100 mg bid. Reviewed outside records 10/17 at [facility]. (Head CT negative. CTPA negative for PE. Echo showing normal LV function mild TR with RVSP 36. Myoview  negative for ischemia by scan.) GI/ Liver issues. Being seen by GI. Will have echo. Edema minimal in hands. Follow up 6-8 wks.

## 2021-02-01 ENCOUNTER — PROCEDURE VISIT (OUTPATIENT)
Dept: CARDIOLOGY CLINIC | Age: 56
End: 2021-02-01
Payer: OTHER GOVERNMENT

## 2021-02-01 DIAGNOSIS — I48.0 PAROXYSMAL ATRIAL FIBRILLATION (HCC): Primary | ICD-10-CM

## 2021-02-01 LAB
LV EF: 58 %
LVEF MODALITY: NORMAL

## 2021-02-02 ENCOUNTER — HOSPITAL ENCOUNTER (EMERGENCY)
Age: 56
Discharge: HOME | End: 2021-02-02
Attending: EMERGENCY MEDICINE
Payer: OTHER GOVERNMENT

## 2021-02-02 ENCOUNTER — TELEPHONE (OUTPATIENT)
Dept: INTERNAL MEDICINE CLINIC | Age: 56
End: 2021-02-02

## 2021-02-02 ENCOUNTER — OFFICE VISIT (OUTPATIENT)
Dept: PRIMARY CARE CLINIC | Age: 56
End: 2021-02-02
Payer: OTHER GOVERNMENT

## 2021-02-02 ENCOUNTER — APPOINTMENT (OUTPATIENT)
Dept: CT IMAGING | Age: 56
End: 2021-02-02
Payer: OTHER GOVERNMENT

## 2021-02-02 VITALS
BODY MASS INDEX: 30.66 KG/M2 | HEART RATE: 74 BPM | TEMPERATURE: 99.2 F | DIASTOLIC BLOOD PRESSURE: 102 MMHG | HEIGHT: 69 IN | WEIGHT: 207 LBS | SYSTOLIC BLOOD PRESSURE: 156 MMHG | RESPIRATION RATE: 17 BRPM | OXYGEN SATURATION: 96 %

## 2021-02-02 DIAGNOSIS — Z20.822 SUSPECTED COVID-19 VIRUS INFECTION: Primary | ICD-10-CM

## 2021-02-02 DIAGNOSIS — R10.84 ABDOMINAL PAIN, ACUTE, GENERALIZED: Primary | ICD-10-CM

## 2021-02-02 LAB
ALBUMIN SERPL-MCNC: 4.3 G/DL (ref 3.4–5)
ALP BLD-CCNC: 88 U/L (ref 40–129)
ALT SERPL-CCNC: 171 U/L (ref 10–40)
ANION GAP SERPL CALCULATED.3IONS-SCNC: 12 MMOL/L (ref 3–16)
AST SERPL-CCNC: 93 U/L (ref 15–37)
BASE EXCESS VENOUS: 5.3 MMOL/L (ref -2–3)
BASOPHILS ABSOLUTE: 0 K/UL (ref 0–0.2)
BASOPHILS RELATIVE PERCENT: 0.7 %
BILIRUB SERPL-MCNC: 0.5 MG/DL (ref 0–1)
BILIRUBIN DIRECT: <0.2 MG/DL (ref 0–0.3)
BILIRUBIN URINE: NEGATIVE
BILIRUBIN, INDIRECT: ABNORMAL MG/DL (ref 0–1)
BLOOD, URINE: NEGATIVE
BUN BLDV-MCNC: 15 MG/DL (ref 7–20)
CALCIUM SERPL-MCNC: 10 MG/DL (ref 8.3–10.6)
CARBOXYHEMOGLOBIN: 0.4 % (ref 0–1.5)
CHLORIDE BLD-SCNC: 101 MMOL/L (ref 99–110)
CLARITY: CLEAR
CO2: 23 MMOL/L (ref 21–32)
COLOR: YELLOW
CREAT SERPL-MCNC: 0.8 MG/DL (ref 0.6–1.1)
EKG ATRIAL RATE: 75 BPM
EKG DIAGNOSIS: NORMAL
EKG P AXIS: 53 DEGREES
EKG P-R INTERVAL: 168 MS
EKG Q-T INTERVAL: 424 MS
EKG QRS DURATION: 86 MS
EKG QTC CALCULATION (BAZETT): 473 MS
EKG R AXIS: -24 DEGREES
EKG T AXIS: -14 DEGREES
EKG VENTRICULAR RATE: 75 BPM
EOSINOPHILS ABSOLUTE: 0.1 K/UL (ref 0–0.6)
EOSINOPHILS RELATIVE PERCENT: 1.9 %
GFR AFRICAN AMERICAN: >60
GFR NON-AFRICAN AMERICAN: >60
GLUCOSE BLD-MCNC: 136 MG/DL (ref 70–99)
GLUCOSE URINE: NEGATIVE MG/DL
HCO3 VENOUS: 25.3 MMOL/L (ref 24–28)
HCT VFR BLD CALC: 45.3 % (ref 36–48)
HEMOGLOBIN: 16 G/DL (ref 12–16)
INR BLD: 1.19 (ref 0.86–1.14)
KETONES, URINE: NEGATIVE MG/DL
LACTIC ACID: 1.3 MMOL/L (ref 0.4–2)
LEUKOCYTE ESTERASE, URINE: NEGATIVE
LIPASE: 66 U/L (ref 13–60)
LYMPHOCYTES ABSOLUTE: 2.6 K/UL (ref 1–5.1)
LYMPHOCYTES RELATIVE PERCENT: 38.7 %
MCH RBC QN AUTO: 32.1 PG (ref 26–34)
MCHC RBC AUTO-ENTMCNC: 35.4 G/DL (ref 31–36)
MCV RBC AUTO: 90.8 FL (ref 80–100)
METHEMOGLOBIN VENOUS: 0.2 % (ref 0–1.5)
MICROSCOPIC EXAMINATION: NORMAL
MONOCYTES ABSOLUTE: 0.8 K/UL (ref 0–1.3)
MONOCYTES RELATIVE PERCENT: 11.5 %
NEUTROPHILS ABSOLUTE: 3.2 K/UL (ref 1.7–7.7)
NEUTROPHILS RELATIVE PERCENT: 47.2 %
NITRITE, URINE: NEGATIVE
O2 SAT, VEN: 95 %
PCO2, VEN: 25.6 MMHG (ref 41–51)
PDW BLD-RTO: 12.7 % (ref 12.4–15.4)
PH UA: 6.5 (ref 5–8)
PH VENOUS: 7.6 (ref 7.35–7.45)
PLATELET # BLD: 253 K/UL (ref 135–450)
PMV BLD AUTO: 7.8 FL (ref 5–10.5)
PO2, VEN: 62.5 MMHG (ref 25–40)
POTASSIUM REFLEX MAGNESIUM: 3.8 MMOL/L (ref 3.5–5.1)
PROTEIN UA: NEGATIVE MG/DL
PROTHROMBIN TIME: 13.8 SEC (ref 10–13.2)
RBC # BLD: 4.99 M/UL (ref 4–5.2)
SODIUM BLD-SCNC: 136 MMOL/L (ref 136–145)
SPECIFIC GRAVITY UA: 1.01 (ref 1–1.03)
TCO2 CALC VENOUS: 26 MMOL/L
TOTAL PROTEIN: 7.5 G/DL (ref 6.4–8.2)
URINE TYPE: NORMAL
UROBILINOGEN, URINE: 0.2 E.U./DL
WBC # BLD: 6.8 K/UL (ref 4–11)

## 2021-02-02 PROCEDURE — 80048 BASIC METABOLIC PNL TOTAL CA: CPT

## 2021-02-02 PROCEDURE — 82803 BLOOD GASES ANY COMBINATION: CPT

## 2021-02-02 PROCEDURE — 93005 ELECTROCARDIOGRAM TRACING: CPT | Performed by: EMERGENCY MEDICINE

## 2021-02-02 PROCEDURE — 36415 COLL VENOUS BLD VENIPUNCTURE: CPT

## 2021-02-02 PROCEDURE — 74176 CT ABD & PELVIS W/O CONTRAST: CPT

## 2021-02-02 PROCEDURE — 80076 HEPATIC FUNCTION PANEL: CPT

## 2021-02-02 PROCEDURE — 96372 THER/PROPH/DIAG INJ SC/IM: CPT

## 2021-02-02 PROCEDURE — 96374 THER/PROPH/DIAG INJ IV PUSH: CPT

## 2021-02-02 PROCEDURE — 83690 ASSAY OF LIPASE: CPT

## 2021-02-02 PROCEDURE — 83605 ASSAY OF LACTIC ACID: CPT

## 2021-02-02 PROCEDURE — 81003 URINALYSIS AUTO W/O SCOPE: CPT

## 2021-02-02 PROCEDURE — 87491 CHLMYD TRACH DNA AMP PROBE: CPT

## 2021-02-02 PROCEDURE — G8417 CALC BMI ABV UP PARAM F/U: HCPCS | Performed by: NURSE PRACTITIONER

## 2021-02-02 PROCEDURE — 84443 ASSAY THYROID STIM HORMONE: CPT

## 2021-02-02 PROCEDURE — G8428 CUR MEDS NOT DOCUMENT: HCPCS | Performed by: NURSE PRACTITIONER

## 2021-02-02 PROCEDURE — 85025 COMPLETE CBC W/AUTO DIFF WBC: CPT

## 2021-02-02 PROCEDURE — 99211 OFF/OP EST MAY X REQ PHY/QHP: CPT | Performed by: NURSE PRACTITIONER

## 2021-02-02 PROCEDURE — 6360000002 HC RX W HCPCS: Performed by: EMERGENCY MEDICINE

## 2021-02-02 PROCEDURE — 99283 EMERGENCY DEPT VISIT LOW MDM: CPT

## 2021-02-02 PROCEDURE — 93306 TTE W/DOPPLER COMPLETE: CPT | Performed by: INTERNAL MEDICINE

## 2021-02-02 PROCEDURE — 84439 ASSAY OF FREE THYROXINE: CPT

## 2021-02-02 PROCEDURE — 85610 PROTHROMBIN TIME: CPT

## 2021-02-02 PROCEDURE — 87591 N.GONORRHOEAE DNA AMP PROB: CPT

## 2021-02-02 RX ORDER — DICYCLOMINE HYDROCHLORIDE 10 MG/ML
10 INJECTION INTRAMUSCULAR ONCE
Status: COMPLETED | OUTPATIENT
Start: 2021-02-02 | End: 2021-02-02

## 2021-02-02 RX ORDER — DICYCLOMINE HYDROCHLORIDE 10 MG/1
10 CAPSULE ORAL 3 TIMES DAILY PRN
Qty: 30 CAPSULE | Refills: 0 | Status: SHIPPED | OUTPATIENT
Start: 2021-02-02 | End: 2021-12-20

## 2021-02-02 RX ORDER — LORAZEPAM 2 MG/ML
1 INJECTION INTRAMUSCULAR ONCE
Status: COMPLETED | OUTPATIENT
Start: 2021-02-02 | End: 2021-02-02

## 2021-02-02 RX ADMIN — LORAZEPAM 1 MG: 2 INJECTION INTRAMUSCULAR; INTRAVENOUS at 11:27

## 2021-02-02 RX ADMIN — DICYCLOMINE HYDROCHLORIDE 10 MG: 20 INJECTION, SOLUTION INTRAMUSCULAR at 13:38

## 2021-02-02 ASSESSMENT — PAIN DESCRIPTION - PROGRESSION: CLINICAL_PROGRESSION: GRADUALLY WORSENING

## 2021-02-02 ASSESSMENT — PAIN DESCRIPTION - FREQUENCY: FREQUENCY: INTERMITTENT

## 2021-02-02 ASSESSMENT — PAIN SCALES - GENERAL: PAINLEVEL_OUTOF10: 10

## 2021-02-02 NOTE — ED TRIAGE NOTES
t is in the emergency department with multiple complaints; ABD pain, vaginal bleeding, diarrhea, constipation, bloating, hypothermia (using head scanner), hand swelling, panic attacks, and pain all over.

## 2021-02-02 NOTE — ED PROVIDER NOTES
[address]          ATTENDING PHYSICIAN NOTE       Date of evaluation: 2/2/2021    Chief Complaint     Abdominal Pain and Vaginal Bleeding      History of Present Illness     Beth Salgado is a 63 y.o. female with a PMH as described below who presents to the ED today with multiple complaints. The patient states that she has abdominal pain, nausea, body aches, she feels cold all the time, feels very anxious, and is having vaginal bleeding. His symptoms have been present for multiple weeks. She has been back and forth between her cardiologist and GI doctor. She states that things got worse last night and she called her primary care doctor who told her to come to the emergency department. She could not get to the emergency department last night shows she presents this morning. She additionally notes that she has had 1 day of vaginal bleeding. She is on Eliquis and states that she has had a hysterectomy. She denies any headache, visual changes, chest pain. She states that her abdominal pain is diffuse and all over including the front and back of her abdomen. The patientstates that there were no other associated signs and symptoms or modifying factors. Patient rates pain as 10/10. Echo 2/01/20:  Summary   Normal left ventricle size, wall thickness, and systolic function with an   estimated ejection fraction of 55-60%. No regional wall motion abnormalities are seen. Normal diastolic function. Estimated pulmonary artery systolic pressure is at 25 mmHg assuming a right   atrial pressure of 3 mmHg. Review of Systems     As described above in the HPI otherwise all the systems reviewed and negative as reported by the patient.     Past Medical, Surgical, Family, and Social History She has a past medical history of Alcohol abuse, in remission, Anxiety state, unspecified, Depressive disorder, not elsewhere classified, Diffuse cystic mastopathy, Extrinsic asthma, unspecified, Hypertension, Leiomyoma of uterus, unspecified, and Other and unspecified ovarian cyst.  She has a past surgical history that includes Hysterectomy and Cholecystectomy. Her family history is not on file. She reports that she has never smoked. She has never used smokeless tobacco. She reports current alcohol use of about 1.0 - 2.0 standard drinks of alcohol per week. She reports that she does not use drugs.     Medications     Discharge Medication List as of 2/2/2021  1:25 PM      CONTINUE these medications which have NOT CHANGED    Details   flecainide (TAMBOCOR) 50 MG tablet Take 1 tablet by mouth 2 times daily, Disp-180 tablet, R-3Normal      omeprazole (PRILOSEC) 40 MG delayed release capsule TAKE 1 CAPSULE BY MOUTH EVERY DAY, Disp-30 capsule, R-5Normal      traZODone (DESYREL) 100 MG tablet TAKE 2 TABLETS BY MOUTH AT BEDTIME, Disp-60 tablet, R-5Normal      zolpidem (AMBIEN) 10 MG tablet Take 1 tablet by mouth nightly as needed for Sleep., Disp-30 tablet, R-1Normal      PREMARIN 0.45 MG tablet TAKE 1 TABLET BY MOUTH DAILY, Disp-30 tablet, R-3Normal      potassium chloride (KLOR-CON M) 20 MEQ extended release tablet TAKE 1 TABLET BY MOUTH TWICE DAILY, Disp-60 tablet,R-5Normal      metoprolol tartrate (LOPRESSOR) 25 MG tablet TAKE 2 TABLETS(50 MG) BY MOUTH TWICE DAILY, Disp-360 tablet,R-3Normal      methylPREDNISolone (MEDROL, BERNICE,) 4 MG tablet Take 1 tablet by mouth See Admin Instructions Take by mouth., Disp-1 kit,R-0Normal      famotidine (PEPCID) 40 MG tablet TK 1 T PO DHistorical Med      butalbital-acetaminophen-caffeine (FIORICET, ESGIC) -40 MG per tablet Take 1 tablet by mouth 2 times daily as needed for Headaches, Disp-20 tablet,R-0Phone In albuterol sulfate  (90 Base) MCG/ACT inhaler INHALE 2 PUFFS EVERY 4 HOURS WHILE AWAKE FOR 7-10 DAYS, THEN EVERY 6 HOURS AS NEEDED FOR WHEEZING, Disp-6.7 g,R-1Normal      ELIQUIS 5 MG TABS tablet TAKE 1 TABLET BY MOUTH TWICE DAILY, Disp-180 tablet,R-3,DAWNormal      fluticasone-vilanterol (BREO ELLIPTA) 100-25 MCG/INH AEPB inhaler Inhale 1 puff into the lungs daily, Disp-1 each,R-5To replace UTIBRONNormal      torsemide (DEMADEX) 20 MG tablet Take 1 daily as needed for water retention, Disp-90 tablet, R-1Normal      Crisaborole (EUCRISA) 2 % OINT Apply BID, Disp-60 g, R-0Print      ketotifen (ZADITOR) 0.025 % ophthalmic solution INSTILL ONE DROP IN THE MORNING AND ONE DROP AT NIGHT INTO BOTH EYES, R-6Historical Med      prednisoLONE acetate (PRED FORTE) 1 % ophthalmic suspension INSTILL ONE DROP IN BOTH EYES TID FOR 7 DAYS, R-1Historical Med      hydrocortisone (ANUSOL-HC) 2.5 % rectal cream Place rectally 4 times daily prn, Disp-28 g, R-2, Print      diphenhydrAMINE (BENADRYL) 25 MG capsule Take 25 mg by mouth every 4 hours as needed for Itching (take 1-2 tabs PRN)             Allergies     She is allergic to amlodipine; bystolic [nebivolol hcl]; demerol; dilaudid [hydromorphone hcl]; losartan; penicillins; spironolactone; sulfa antibiotics; and iodides. Physical Exam     INITIAL VITALS: BP: (!) 168/97, Temp: 99.2 °F (37.3 °C), Pulse: 75, Resp: 24, SpO2: 100 %   Physical Exam  Vitals signs and nursing note reviewed. Constitutional:       Appearance: She is well-developed. She is obese. HENT:      Head: Normocephalic and atraumatic. Eyes:      Extraocular Movements: Extraocular movements intact. Pupils: Pupils are equal, round, and reactive to light. Cardiovascular:      Rate and Rhythm: Normal rate and regular rhythm. Pulmonary:      Effort: Pulmonary effort is normal.      Breath sounds: Normal breath sounds.    Abdominal: Comments: Mild diffuse abdominal tenderness without any rebound tenderness or involuntary guarding. Skin:     General: Skin is warm. Capillary Refill: Capillary refill takes less than 2 seconds. Neurological:      General: No focal deficit present. Mental Status: She is alert and oriented to person, place, and time. Psychiatric:         Mood and Affect: Mood is anxious. Comments: Tearful. Very anxious appearing         DiagnosticResults     EKG   Indication: Abdominal pain: Ventricular rate 75, PA interval 160, QRS 86, QTc 473, axis -24 degrees. Normal sinus rhythm. No acute ST-T wave elevations depressions concerning for acute ischemia infarct. No significant change compared to prior EKG performed on January 28, 2021. RADIOLOGY:  CT ABDOMEN PELVIS WO CONTRAST Additional Contrast? None   Final Result      1. No acute abnormality involving abdomen or pelvis   2. Right ovary 2.2 cm simple appearing cyst almost assuredly benign with no follow-up recommended   3. Moderate atrophy of left kidney   4. Hepatic steatosis   5.  Status post cholecystectomy and hysterectomy          LABS:   Results for orders placed or performed during the hospital encounter of 02/02/21   CBC auto differential   Result Value Ref Range    WBC 6.8 4.0 - 11.0 K/uL    RBC 4.99 4.00 - 5.20 M/uL    Hemoglobin 16.0 12.0 - 16.0 g/dL    Hematocrit 45.3 36.0 - 48.0 %    MCV 90.8 80.0 - 100.0 fL    MCH 32.1 26.0 - 34.0 pg    MCHC 35.4 31.0 - 36.0 g/dL    RDW 12.7 12.4 - 15.4 %    Platelets 024 021 - 914 K/uL    MPV 7.8 5.0 - 10.5 fL    Neutrophils % 47.2 %    Lymphocytes % 38.7 %    Monocytes % 11.5 %    Eosinophils % 1.9 %    Basophils % 0.7 %    Neutrophils Absolute 3.2 1.7 - 7.7 K/uL    Lymphocytes Absolute 2.6 1.0 - 5.1 K/uL    Monocytes Absolute 0.8 0.0 - 1.3 K/uL    Eosinophils Absolute 0.1 0.0 - 0.6 K/uL    Basophils Absolute 0.0 0.0 - 0.2 K/uL   Basic Metabolic Panel w/ Reflex to MG   Result Value Ref Range Ventricular Rate 75 BPM    Atrial Rate 75 BPM    P-R Interval 168 ms    QRS Duration 86 ms    Q-T Interval 424 ms    QTc Calculation (Bazett) 473 ms    P Axis 53 degrees    R Axis -24 degrees    T Axis -14 degrees    Diagnosis       EKG performed in ER and to be interpreted by ER physician. Confirmed by MD, ER ([id number]),  Chivo Stewart ([id number]) on 2/2/2021 11:45:38 AM       ED BEDSIDE ULTRASOUND:      RECENT VITALS:  BP: (!) 156/102, Temp: 99.2 °F (37.3 °C),Pulse: 74, Resp: 17, SpO2: 96 %     Procedures         ED Course     Nursing Notes, Past Medical Hx, Past Surgical Hx, Social Hx, Allergies, and Family Hx werereviewed. The patient was given thefollowing medications:  Orders Placed This Encounter   Medications    LORazepam (ATIVAN) injection 1 mg    dicyclomine (BENTYL) injection 10 mg    dicyclomine (BENTYL) 10 MG capsule     Sig: Take 1 capsule by mouth 3 times daily as needed (abdominal cramping)     Dispense:  30 capsule     Refill:  0       CONSULTS:  IP CONSULT TO [address] / ASSESSMENT / Ngozi Ramy is a 63 y.o. female who presents with multiple complaints including abdominal pain, vaginal bleeding, chills. Laboratory studies were remarkably normal.  Lipase was slightly elevated at 60 however she does not have any clinical features suggestive of pancreatitis. CT scan of the abdomen pelvis performed without contrast was also unremarkable with a small right ovarian cyst.  Pelvic exam did not reveal any vaginal bleeding whatsoever. No vaginal discharge. Ultimately no obvious explanation for the patient's symptoms and question if there was ever vaginal bleeding given the unremarkable pelvic examination. I discussed the case with her primary care doctor who agreed with the plan to start her on a short course of Bentyl and for follow-up. Patient was discharged home in good condition.         Clinical Impression 1. Abdominal pain, acute, generalized        Disposition     PATIENT REFERRED TO:  Andrew Hutchinson MD  [address] [facility]  [phone]            DISCHARGE MEDICATIONS:  Discharge Medication List as of 2/2/2021  1:25 PM      START taking these medications    Details   dicyclomine (BENTYL) 10 MG capsule Take 1 capsule by mouth 3 times daily as needed (abdominal cramping), Disp-30 capsule, R-0Print             DISPOSITION           Joaquim Hashimoto, MD  02/02/21 [id number]

## 2021-02-02 NOTE — PROGRESS NOTES
Luis Eduardo Schmidt received a viral test for COVID-19. They were educated on isolation and quarantine as appropriate. For any symptoms, they were directed to seek care from their PCP, given contact information to establish with a doctor, directed to an urgent care or the emergency room.

## 2021-02-02 NOTE — TELEPHONE ENCOUNTER
Pt calling in stating she is very sick and needs to speak to Provider. Pt recently had cardiac test and xray and has not received results as of yet. Pt is reporting the following sx:  Pain in stomach, colon, severe headache in back of head, unable to keep temp above 94.0 in the middle of the night. BP is high, hands and face swollen. Lost 9 lbs in 3 days due to diarrhea.     Pt stated having all same sx as what had her in hospital last year at [facility] does not want to go to hospital again and is asking to please speak to her PCP.      [phone] pt phone

## 2021-02-02 NOTE — ED NOTES
Patient discharged to home via family. Written discharge instructions reviewed with understanding. Copy of AVS and signed prescription sent home with patient. Patient able to walk from ED without assistance.        Adri Enriquez RN  02/02/21 1542

## 2021-02-03 ENCOUNTER — CARE COORDINATION (OUTPATIENT)
Dept: CARE COORDINATION | Age: 56
End: 2021-02-03

## 2021-02-03 ENCOUNTER — TELEPHONE (OUTPATIENT)
Dept: INTERNAL MEDICINE CLINIC | Age: 56
End: 2021-02-03

## 2021-02-03 DIAGNOSIS — R51.9 ACUTE NONINTRACTABLE HEADACHE, UNSPECIFIED HEADACHE TYPE: ICD-10-CM

## 2021-02-03 LAB
C TRACH DNA GENITAL QL NAA+PROBE: NEGATIVE
N. GONORRHOEAE DNA: NEGATIVE
SARS-COV-2, NAA: NOT DETECTED
T4 FREE: 1.8 NG/DL (ref 0.9–1.8)
TSH REFLEX: 4.57 UIU/ML (ref 0.27–4.2)

## 2021-02-03 RX ORDER — BUTALBITAL, ACETAMINOPHEN AND CAFFEINE 50; 325; 40 MG/1; MG/1; MG/1
1 TABLET ORAL 2 TIMES DAILY PRN
Qty: 20 TABLET | Refills: 0 | Status: SHIPPED | OUTPATIENT
Start: 2021-02-03 | End: 2022-06-02 | Stop reason: SDUPTHER

## 2021-02-03 NOTE — TELEPHONE ENCOUNTER
Pt would like to have a refill on firorecet for her migraines please advise     melania apple [facility]

## 2021-02-03 NOTE — CARE COORDINATION
Patient contacted regarding COVID-19 exposure. Discussed COVID-19 related testing which was pending at this time. Test results were pending. Patient informed of results, if available? Test results are pending    Care Transition Nurse/ Ambulatory Care Manager contacted the patient by telephone to perform post discharge assessment. Call within 2 business days of discharge: Yes. Verified name and  with patient as identifiers. Provided introduction to self, and explanation of the CTN/ACM role, and reason for call due to risk factors for infection and/or exposure to COVID-19. Symptoms reviewed with patient who verbalized the following symptoms: pain or aching joints, chills or shaking and headache. Patient reports that symptoms not as bad as yesterday but that she still feels really bad. .      Due to no new or worsening symptoms encounter was not routed to provider for escalation. Discussed follow-up appointments. If no appointment was previously scheduled, appointment scheduling offered: No Patient already has a follow up scheduled with [facility] follow up appointment(s):   Future Appointments   Date Time Provider Geri Brooks   2021  9:00 AM Mary Carmen Moreau MD [facility]   2021  3:00 PM Tonia Solis RD, LD [facility]   3/17/2021  2:00 PM Andrea Peters MD [facility] follow up appointment(s): augustin    Non-face-to-face services provided:  Obtained and reviewed discharge summary and/or continuity of care documents     Advance Care Planning:   Does patient have an Advance Directive:  reviewed and current. Patient has following risk factors of: HTN. CTN/ACM reviewed discharge instructions, medical action plan and red flags such as increased shortness of breath, increasing fever and signs of decompensation with patient who verbalized understanding.    Discussed exposure protocols and quarantine with CDC Guidelines What to do if you are sick with coronavirus disease 2019. Patient was given an opportunity for questions and concerns. The patient agrees to contact the [facility] exposure line [phone], [facility] department [place] Department of Health: ([phone]) and PCP office for questions related to their healthcare. CTN/ACM provided contact information for future needs. Reviewed and educated patient on any new and changed medications related to discharge diagnosis     Patient/family/caregiver given information for GetWell Loop and agrees to enroll yes  Patient's preferred e-mail: [email or web address]   Patient's preferred phone number: see chart  Based on Loop alert triggers, patient will be contacted by nurse care manager for worsening symptoms. Pt will be further monitored by COVID Loop Team based on severity of symptoms and risk factors.

## 2021-02-04 ENCOUNTER — VIRTUAL VISIT (OUTPATIENT)
Dept: INTERNAL MEDICINE CLINIC | Age: 56
End: 2021-02-04
Payer: OTHER GOVERNMENT

## 2021-02-04 ENCOUNTER — TELEPHONE (OUTPATIENT)
Dept: INTERNAL MEDICINE CLINIC | Age: 56
End: 2021-02-04

## 2021-02-04 ENCOUNTER — NURSE TRIAGE (OUTPATIENT)
Dept: OTHER | Facility: CLINIC | Age: 56
End: 2021-02-04

## 2021-02-04 DIAGNOSIS — R60.9 WATER RETENTION: ICD-10-CM

## 2021-02-04 DIAGNOSIS — F43.22 ADJUSTMENT DISORDER WITH ANXIETY: ICD-10-CM

## 2021-02-04 DIAGNOSIS — I48.0 PAROXYSMAL ATRIAL FIBRILLATION (HCC): ICD-10-CM

## 2021-02-04 DIAGNOSIS — R14.0 ABDOMINAL BLOATING: ICD-10-CM

## 2021-02-04 DIAGNOSIS — N95.1 MENOPAUSAL SYMPTOMS: ICD-10-CM

## 2021-02-04 DIAGNOSIS — R19.5 LOOSE STOOLS: ICD-10-CM

## 2021-02-04 DIAGNOSIS — R53.83 OTHER FATIGUE: Primary | ICD-10-CM

## 2021-02-04 DIAGNOSIS — K21.9 GASTROESOPHAGEAL REFLUX DISEASE, UNSPECIFIED WHETHER ESOPHAGITIS PRESENT: ICD-10-CM

## 2021-02-04 PROCEDURE — 99214 OFFICE O/P EST MOD 30 MIN: CPT | Performed by: INTERNAL MEDICINE

## 2021-02-04 PROCEDURE — 3017F COLORECTAL CA SCREEN DOC REV: CPT | Performed by: INTERNAL MEDICINE

## 2021-02-04 PROCEDURE — G8427 DOCREV CUR MEDS BY ELIG CLIN: HCPCS | Performed by: INTERNAL MEDICINE

## 2021-02-04 RX ORDER — TORSEMIDE 20 MG/1
TABLET ORAL
Qty: 90 TABLET | Refills: 1 | Status: SHIPPED | OUTPATIENT
Start: 2021-02-04 | End: 2021-07-28

## 2021-02-04 ASSESSMENT — PATIENT HEALTH QUESTIONNAIRE - PHQ9
SUM OF ALL RESPONSES TO PHQ QUESTIONS 1-9: 0
SUM OF ALL RESPONSES TO PHQ QUESTIONS 1-9: 0
1. LITTLE INTEREST OR PLEASURE IN DOING THINGS: 0
2. FEELING DOWN, DEPRESSED OR HOPELESS: 0

## 2021-02-04 ASSESSMENT — ENCOUNTER SYMPTOMS
TROUBLE SWALLOWING: 0
ABDOMINAL DISTENTION: 1
ABDOMINAL PAIN: 1
VOMITING: 0
DIARRHEA: 1
SHORTNESS OF BREATH: 0

## 2021-02-04 NOTE — PROGRESS NOTES
SUBJECTIVE:  Patient ID: Cee Henning is an 63 y.o. female. HPI: Patient here today for the f/u of chronic problems-- see Problem List and associated comments. New issues or complaints include (also see Assessment for more details):      TELEHEALTH EVALUATION -- Audio/Visual (During OIYXP-47 public health emergency)    Pursuant to the emergency declaration under the [address] [place] [address] [facility] authority and the [employer] Act, this Virtual  Visit was conducted, with patient's consent, to reduce the patient's risk of exposure to COVID-19 and provide continuity of care for an established patient. Services were provided through a video synchronous discussion virtually to substitute for in-person clinic visit. A rather long and complicated set of symptoms. Generally she feels awful in multiple respects including GI symptoms with bloating and loose stools, abdominal discomfort, vaginal bleeding, stress and anxiety, low body temperature, arthritis symptoms, occasional headaches. There have been no new or significant medication changes. She has been in contact with her gastroenterologist and cardiologist.  She was in the emergency room yesterday for the symptoms including the vaginal bleedingdiscussed with the ER doctor and records reviewedher exam was normal and there was no sign of any bleeding either from the vaginal area or urine. Her labs were okay. CT abdomen was okay except for a small right ovarian cyst noted. 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 She has tested negative for Covid several times. Review of Systems   Constitutional: Positive for fatigue. Negative for fever. Low body temperature   HENT: Negative for trouble swallowing. Respiratory: Negative for shortness of breath. Cardiovascular: Negative for chest pain and palpitations. Gastrointestinal: Positive for abdominal distention, abdominal pain and diarrhea. Negative for vomiting. Genitourinary: Positive for vaginal bleeding (Emergency room evaluation completely normal). Negative for difficulty urinating. Musculoskeletal: Positive for arthralgias and myalgias. Skin: Negative for rash. Neurological: Positive for headaches. Negative for tremors. Psychiatric/Behavioral: Positive for agitation and sleep disturbance. Negative for dysphoric mood and suicidal ideas. The patient is nervous/anxious. OBJECTIVE:    There were no vitals taken for this visit. Physical Exam  Constitutional:       Appearance: She is not toxic-appearing. Eyes:      Extraocular Movements: Extraocular movements intact. Pulmonary:      Effort: No respiratory distress. Skin:     Coloration: Skin is not pale. Neurological:      General: No focal deficit present. Mental Status: She is alert and oriented to person, place, and time. Cranial Nerves: Cranial nerves are intact. No cranial nerve deficit. Motor: No tremor. Psychiatric:         Attention and Perception: Attention normal.         Mood and Affect: Mood is anxious and depressed. Speech: Speech normal.         Behavior: Behavior is not agitated, slowed, withdrawn or hyperactive. Behavior is cooperative. Thought Content: Thought content is not delusional. Thought content does not include suicidal ideation. Cognition and Memory: Cognition normal.         ASSESSMENT:       Encounter Diagnoses   Name Primary?  Other fatigue Yes    Menopausal symptoms     Paroxysmal atrial fibrillation (HCC)     Abdominal bloating     Gastroesophageal reflux disease, unspecified whether esophagitis present     Adjustment disorder with anxiety     Loose stools        Paroxysmal atrial fibrillation ([facility] [address]  She remains on anticoagulation.   Clinically sinus rhythm including in the ER. Abdominal bloating  IBS-like symptoms. She has been unresponsive to all medications. She has been in contact with GI. There is diarrhea. No recent antibiotics. We will try a course of Xifaxan if covered by insurance. GERD (gastroesophageal reflux disease)  Currently treatedoccasional breakthrough. Menopausal symptoms  She is on Premarin 0.45 mg. She believes she is having breakthrough menopausal type symptoms. Will check FSH. Adjustment disorder with anxiety  Multiple issues going on in her life. She does have lorazepam but she has not using it should she feel she needs to stay alert because she is caring for her grandchild. Other fatigue  Generalized fatigue. Could be emotional.  She also states that her body temperature remains at 95 or lower. We will check a a.m. cortisol level soon. Other labs in the ER were okay yesterday. TSH okay. Loose stools  Relatively new symptomdoubt related to her cholecystectomy which was several years ago. Could be IBS with diarrhea. Also has abdominal bloating. We will try Xifaxan. PLAN:See ASSESSMENT for evaluation & PLAN     Orders Placed This Encounter   Procedures    Cortisol Total     Standing Status:   Future     Standing Expiration Date:   2/4/2022     Order Specific Question:   8AM or 4PM?     Answer:   NA    Follicle Stimulating Hormone     Standing Status:   Future     Standing Expiration Date:   2/4/2022     , PMH, SH and FH reviewed and noted. Recent and past labs, tests and consultsalso reviewed. Recent or new meds also reviewed.      The patient encounter today has included elements of the following:   - preparation to see the patient   - review of outside records, histories   - performing an appropriate medical evaluation and examination   - counseling pf patient/family/caregiver   - ordering/changing medications, tests, procedures   - referring and communicating with other HCPs when appropriate - documentation in the EMR   - independently interpreting results and communicating results to patient/family/caregiver   - care coordination and completing appropriate referrals and forms      The patient encounter today included at least 1 out of 3 of the following:   - review of external notes, tests and outside assessments, or   - independent interpretation of outside tests performed by other HCPs, or   - discussion of management of tests and interpretations with outside HCPs.

## 2021-02-04 NOTE — ASSESSMENT & PLAN NOTE
Generalized fatigue. Could be emotional.  She also states that her body temperature remains at 95 or lower. We will check a a.m. cortisol level soon. Other labs in the ER were okay yesterday. TSH okay.

## 2021-02-04 NOTE — TELEPHONE ENCOUNTER
1. Just started an hour ago  2.about an hour ago. 3.watery  4. No  5. Yes-   6. 8/10  7. Yes  8. No  9. No  10. No  11. Chills, headache, abdominal pain, diarrhea, nausea and fatigue. 12. na    Reason for Disposition   [1] Blood in the stool AND [2] small amount of blood   (Exception: only on toilet paper. Reason: diarrhea can cause rectal irritation with blood on wiping)    Protocols used: DIARRHEA-ADULT-    Call received on [address]. Brief description of triage: Oly Dash is having s/s since 1/31/2021 they are headache, chills, diarrhea, abdominal pain, nausea and fatigue. Caller denies any SOB or chest pain at this time. Triage indicates for patient to See PCP within 24 hours. Care advice provided. Recommended using local department of health website for testing locations and up to date information. Caller verbalizes understanding, denies any other questions or concerns; instructed to call back for any new or worsening symptoms.

## 2021-02-04 NOTE — ASSESSMENT & PLAN NOTE
She is on Premarin 0.45 mg. She believes she is having breakthrough menopausal type symptoms. Will check FSH.

## 2021-02-04 NOTE — LETTER
[facility] [address]  [address], [address] [place] [address]  Phone: [phone]  Fax: [phone]    Roxana Kerr MD        February 9, 2021    To Whom It May Concern,    Fernanda Moore is prescribed Xifaxan for irritable bowel syndrome with diarrhea. She has failed conservative measures including diet changes and dicyclomine. Fernanda Moore is also prescribed Zolpidem for chronic insomnia after having failed multiple other medications including mirtazapine, trazodone, Lunesta and melatonin.        Sincerely,        Roxana Kerr MD

## 2021-02-04 NOTE — ASSESSMENT & PLAN NOTE
Relatively new symptomdoubt related to her cholecystectomy which was several years ago. Could be IBS with diarrhea. Also has abdominal bloating. We will try Xifaxan.

## 2021-02-05 ENCOUNTER — TELEPHONE (OUTPATIENT)
Dept: INTERNAL MEDICINE CLINIC | Age: 56
End: 2021-02-05

## 2021-02-05 ENCOUNTER — TELEPHONE (OUTPATIENT)
Dept: CARDIOLOGY CLINIC | Age: 56
End: 2021-02-05

## 2021-02-05 NOTE — TELEPHONE ENCOUNTER
PA submitted via [facility] for Xifaxan 550MG tablets.   (Key: [id number])     STATUS: PENDING

## 2021-02-08 ENCOUNTER — TELEPHONE (OUTPATIENT)
Dept: INTERNAL MEDICINE CLINIC | Age: 56
End: 2021-02-08

## 2021-02-08 DIAGNOSIS — F51.04 CHRONIC INSOMNIA: ICD-10-CM

## 2021-02-08 RX ORDER — ZOLPIDEM TARTRATE 10 MG/1
10 TABLET ORAL NIGHTLY PRN
Qty: 30 TABLET | Refills: 1 | Status: SHIPPED | OUTPATIENT
Start: 2021-02-08 | End: 2021-04-26

## 2021-02-08 NOTE — TELEPHONE ENCOUNTER
Please refill Damian Rice pt is not pretty upset her preauth has not been completed and says she will call back multiple times until it is completed.  Please advise

## 2021-02-08 NOTE — TELEPHONE ENCOUNTER
Patient called and said 2ndary provider won't  rest of  the cost of the xifaxan. Form was sent and patient haven't heard anything. Pharmacy said they sent info. Pharmacy filled just for weekend. Really need.     Please call to advise

## 2021-02-09 ENCOUNTER — TELEPHONE (OUTPATIENT)
Dept: INTERNAL MEDICINE CLINIC | Age: 56
End: 2021-02-09

## 2021-02-09 NOTE — TELEPHONE ENCOUNTER
Please type and send the following letter for prior authorization to whenever prior authorization gods that need to be notified. Patient prescribed Xifaxan for irritable bowel syndrome with diarrhea. She has failed conservative measures including diet changes and dicyclomine. Patient was also prescribed zolpidem for chronic insomnia after having failed multiple other medications including mirtazapine, trazodone, Lunesta and melatonin.     Sincerely, me

## 2021-02-09 NOTE — TELEPHONE ENCOUNTER
Patient calling and was put on   rifaximin (XIFAXAN) 550 MG tablet  was  and the patient specialist  DR. Laura Chong is questioning why the patient needs to be on this medication? 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 Please advise. Patient states that she was awaken this morning with horrible stomach pains this morning and took dicyclomine 10 mg and it helped but is totally confused on what she is suppose to be doing all the Dr's do not correspond and they don't seem to  Understand. Please advise    Per the auth dept,  a PA does not work. Kyara Hutchison said that a letter of medical necessity for Xifaxan and Zolpidem needs to be faxed to [phone], Attn: [facility] CC Program Ops. I asked Latrice James if anything else is needed and she said no.

## 2021-02-09 NOTE — TELEPHONE ENCOUNTER
Called [facility] to check on status of PA for Xifaxan 550MG tablets. Spoke to Jerson. (works for their pharmacy help desk) who told me that patient's plan requires a letter of medical necessity for her medications - a PA does not work. Marcelino Melendez said that a letter of medical necessity for Xifaxan and Zolpidem needs to be faxed to [phone], Attn: [facility] CC Program Ops. I asked Ladarius Mckenzie if anything else is needed and she said no. Sorry!

## 2021-02-11 NOTE — TELEPHONE ENCOUNTER
Pt called again checking on the PA that needs to run through [employer] not [employer] because [employer] is already paid for what they are going to pay      She said that the [employer] insurance told her there was something with a PA started and nothing else is being done. Pt is demanding that it get done. Explained to her there is a process when it comes to the PA department and they will let us know when it is complete that it does take more then 24 hours sometimes especially if insurance company has to replay and questions need answered. Please let us know what is going on with the PA.  The letter requested was sent on 2/8

## 2021-02-12 NOTE — TELEPHONE ENCOUNTER
Regarding Xifaxan: Contacted Mitch ('s PBM) and they said the Xifaxan was approved because the claims went through at [facility]. Yesenia Cobian. who works for [employer] told me that on 2/5/21 there was a claim for 10 tablets that cost $116.39 and then there was another claim on 2/8/21 for the remaining 32 tablets at a cost of $371.49. So, apparently the cost of the medication, not the approval, is the issue. I asked about a tier reduction, and Tanya said that would be [facility]'s responsibility, except [facility] does not having a tiering system for their medications. Tanya also stated that through [facility] the patient is responsible for 25% of the drug's cost no matter what. Micaela Mathew suggested that patient may be able to save money using mail order or getting her medications directly from the [place]. I submitted a PA for Zolpidem Tartrate 10MG tablets to [employer].   Chantel OVERTON Case ID: [id number]    STATUS: PENDING/MARKED AS URGENT

## 2021-02-12 NOTE — TELEPHONE ENCOUNTER
Rep was able to give us a 2 week supply for the medication. Informed pt that the drug rep will bring 2 weeks worth of samples for her to have while everything gets worked out and we can see what we can do that way she has the medication. Pt agreed.      Will call pt once meds are dropped off

## 2021-02-12 NOTE — TELEPHONE ENCOUNTER
Tried to call pt but  stated she was still sleeping  Asked for him to have pt call us back when she wakes up.

## 2021-02-18 ENCOUNTER — VIRTUAL VISIT (OUTPATIENT)
Dept: INTERNAL MEDICINE CLINIC | Age: 56
End: 2021-02-18

## 2021-02-18 DIAGNOSIS — E66.3 OVERWEIGHT (BMI 25.0-29.9): Primary | ICD-10-CM

## 2021-02-18 NOTE — PROGRESS NOTES
Medical Nutrition Therapy Follow Up  Patient Care Team:  Darnell Ballesteros MD as PCP - General  Darnell Ballesteros MD as PCP - [facility] Empaneled Provider  Anai Pollack LPN as Care Transitions Nurse    Reason for visit: VIRTUAL VISIT -  F/u WM  Patient self-assessment of progress: \"I've been sick--lost 9 lb through not eating\"        ASSESSMENT/PLAN:   NUTRITION DIAGNOSIS    #1 Problem: Overweight/Obesity (NC-3.3)  Related to: Excessive energy intake or physical inactivity  As Evidenced by: BMI more than normative standard for age and sex (BMI=30.57)          NUTRITION INTERVENTION  Nutrition Prescription: Use SlimFast with Benefiber added instead of skipping a meal    Interventions:  Explanation of intestinal function      NUTRITION MONITORING AND EVALUATION  Avoid meal-skipping         Patient Active Problem List   Diagnosis    Asthma    Diffuse cystic mastopathy    Gestational diabetes    Essential hypertension    Chronic insomnia    Alcohol abuse    Adjustment disorder with anxiety    Menopausal symptoms    GERD (gastroesophageal reflux disease)    Overweight (BMI 25.0-29. 9)    Obstructive sleep apnea syndrome    Numbness of toes    Loose stools    Popliteal cyst, left    Paroxysmal atrial fibrillation (HCC)    Blepharitis    Environmental allergies    Other fatigue    Steatohepatitis    Attention deficit disorder (ADD)    Abdominal bloating       Current Outpatient Medications   Medication Sig Dispense Refill    zolpidem (AMBIEN) 10 MG tablet Take 1 tablet by mouth nightly as needed for Sleep.  30 tablet 1    torsemide (DEMADEX) 20 MG tablet TAKE 1 TABLET BY MOUTH DAILY AS NEEDED FOR WATER RETENTION 90 tablet 1    rifaximin (XIFAXAN) 550 MG tablet Take 1 tablet by mouth 3 times daily 42 tablet 0    butalbital-acetaminophen-caffeine (FIORICET, ESGIC) -40 MG per tablet Take 1 tablet by mouth 2 times daily as needed for Headaches 20 tablet 0  dicyclomine (BENTYL) 10 MG capsule Take 1 capsule by mouth 3 times daily as needed (abdominal cramping) 30 capsule 0    flecainide (TAMBOCOR) 50 MG tablet Take 1 tablet by mouth 2 times daily 180 tablet 3    omeprazole (PRILOSEC) 40 MG delayed release capsule TAKE 1 CAPSULE BY MOUTH EVERY DAY 30 capsule 5    traZODone (DESYREL) 100 MG tablet TAKE 2 TABLETS BY MOUTH AT BEDTIME 60 tablet 5    PREMARIN 0.45 MG tablet TAKE 1 TABLET BY MOUTH DAILY 30 tablet 3    potassium chloride (KLOR-CON M) 20 MEQ extended release tablet TAKE 1 TABLET BY MOUTH TWICE DAILY 60 tablet 5    metoprolol tartrate (LOPRESSOR) 25 MG tablet TAKE 2 TABLETS(50 MG) BY MOUTH TWICE DAILY 360 tablet 3    famotidine (PEPCID) 40 MG tablet TK 1 T PO D      albuterol sulfate  (90 Base) MCG/ACT inhaler INHALE 2 PUFFS EVERY 4 HOURS WHILE AWAKE FOR 7-10 DAYS, THEN EVERY 6 HOURS AS NEEDED FOR WHEEZING 6.7 g 1    ELIQUIS 5 MG TABS tablet TAKE 1 TABLET BY MOUTH TWICE DAILY 180 tablet 3    fluticasone-vilanterol (BREO ELLIPTA) 100-25 MCG/INH AEPB inhaler Inhale 1 puff into the lungs daily 1 each 5    ketotifen (ZADITOR) 0.025 % ophthalmic solution INSTILL ONE DROP IN THE MORNING AND ONE DROP AT NIGHT INTO BOTH EYES  6     No current facility-administered medications for this visit. NUTRITION RE-ASSESSMENT    Anthropometric Measurement Changes:     Wt:   Wt Readings from Last 3 Encounters:   02/02/21 207 lb (93.9 kg)   01/28/21 214 lb (97.1 kg)   08/04/20 204 lb (92.5 kg)       BMI:   BMI Readings from Last 3 Encounters:   02/02/21 30.57 kg/m²   01/28/21 31.60 kg/m²   08/04/20 30.13 kg/m²     Weight Change:Yes    Food and Nutrition Changes:   B: chacko (1 piece), egg, biscuit, gatorade zero  Drinking gatorade zero  benefiber once daily  Plans to add align    Struggling with diarrhea and constipation  Has slimfast in garage      Physical Activity Changes:   Current Activity: moving around the house, taking care of grandchild Barriers:   Chronically feeling bad    Follow Up Plan: one month    Referring Provider: Ibrahima Smith MD   Time spent with patient: 20 minutes

## 2021-02-25 ENCOUNTER — VIRTUAL VISIT (OUTPATIENT)
Dept: INTERNAL MEDICINE CLINIC | Age: 56
End: 2021-02-25

## 2021-02-25 DIAGNOSIS — R14.0 ABDOMINAL BLOATING: Primary | ICD-10-CM

## 2021-02-25 NOTE — PROGRESS NOTES
MNT     Patient was prescribed a Low-FODMAP diet by her gastroenterologist. She is confused about the contradictory guidelines she was given in the printed materials. I provided her with Low-FODMAP Nutrition Tx guidelines from the Academy of Nutrition and Dietetics. Encouraged her to follow the elimination diet strictly for 6 weeks before re-introducing foods. Follow-up scheduled in one week to review progress.     John Gimenez RDN, ADITI, CDE

## 2021-02-26 DIAGNOSIS — F41.9 ANXIETY: ICD-10-CM

## 2021-02-26 DIAGNOSIS — R89.2 ABNORMAL DRUG SCREEN: ICD-10-CM

## 2021-02-26 RX ORDER — LORAZEPAM 2 MG/1
TABLET ORAL
Qty: 90 TABLET | Refills: 1 | Status: SHIPPED | OUTPATIENT
Start: 2021-02-26 | End: 2021-03-08

## 2021-03-08 ENCOUNTER — TELEPHONE (OUTPATIENT)
Dept: ENT CLINIC | Age: 56
End: 2021-03-08

## 2021-03-08 DIAGNOSIS — J32.9 RECURRENT SINUSITIS: Primary | ICD-10-CM

## 2021-03-08 RX ORDER — AZITHROMYCIN 250 MG/1
TABLET, FILM COATED ORAL
Qty: 1 PACKET | Refills: 0 | Status: SHIPPED | OUTPATIENT
Start: 2021-03-08 | End: 2021-04-29

## 2021-03-08 NOTE — TELEPHONE ENCOUNTER
Pt called and states she would like a rx for Zpack for her sinus infection. Pt states she has a yellow nasal discharge, her body aches, ears are clogged and she has asthma and cannot use her CPAP machine because her nose being clogged.   # [phone]  [facility]

## 2021-03-25 ENCOUNTER — OFFICE VISIT (OUTPATIENT)
Dept: CARDIOLOGY CLINIC | Age: 56
End: 2021-03-25
Payer: OTHER GOVERNMENT

## 2021-03-25 VITALS
HEART RATE: 64 BPM | WEIGHT: 214 LBS | DIASTOLIC BLOOD PRESSURE: 80 MMHG | TEMPERATURE: 97.8 F | SYSTOLIC BLOOD PRESSURE: 130 MMHG | BODY MASS INDEX: 31.6 KG/M2

## 2021-03-25 DIAGNOSIS — R94.31 ABNORMAL EKG: ICD-10-CM

## 2021-03-25 DIAGNOSIS — I48.0 PAROXYSMAL ATRIAL FIBRILLATION (HCC): Primary | ICD-10-CM

## 2021-03-25 DIAGNOSIS — I10 ESSENTIAL HYPERTENSION: ICD-10-CM

## 2021-03-25 PROCEDURE — G8484 FLU IMMUNIZE NO ADMIN: HCPCS | Performed by: INTERNAL MEDICINE

## 2021-03-25 PROCEDURE — G8427 DOCREV CUR MEDS BY ELIG CLIN: HCPCS | Performed by: INTERNAL MEDICINE

## 2021-03-25 PROCEDURE — G8417 CALC BMI ABV UP PARAM F/U: HCPCS | Performed by: INTERNAL MEDICINE

## 2021-03-25 PROCEDURE — 1036F TOBACCO NON-USER: CPT | Performed by: INTERNAL MEDICINE

## 2021-03-25 PROCEDURE — 3017F COLORECTAL CA SCREEN DOC REV: CPT | Performed by: INTERNAL MEDICINE

## 2021-03-25 PROCEDURE — 99214 OFFICE O/P EST MOD 30 MIN: CPT | Performed by: INTERNAL MEDICINE

## 2021-03-25 ASSESSMENT — ENCOUNTER SYMPTOMS
COUGH: 0
SHORTNESS OF BREATH: 0
CHEST TIGHTNESS: 0
CHOKING: 0

## 2021-03-25 NOTE — PROGRESS NOTES
Relationship status: Not on file    Intimate partner violence     Fear of current or ex partner: Not on file     Emotionally abused: Not on file     Physically abused: Not on file     Forced sexual activity: Not on file   Other Topics Concern    Not on file   Social History Narrative    Not on file       FH reviewed, denies FH cardiac issues. Vitals:    03/25/21 1113   BP: 130/80   Pulse: 64   Temp: 97.8 °F (36.6 °C)       Wt 214    Review of Systems   Constitutional: Negative for activity change, appetite change and fatigue. Respiratory: Negative for cough, choking, chest tightness and shortness of breath. Cardiovascular: Negative for chest pain, palpitations and leg swelling. Denies PND or orthopnea. No tachycardia or syncope. Neurological: Negative for dizziness, syncope and light-headedness. Psychiatric/Behavioral: Negative for agitation, behavioral problems and confusion. All other systems reviewed negative as done. Objective:   Physical Exam   Constitutional: She is oriented to person, place, and time. She appears well-developed and well-nourished. No distress. HENT:   Head: Normocephalic and atraumatic. Eyes: Conjunctivae and EOM are normal. Right eye exhibits no discharge. Left eye exhibits no discharge. Neck: Normal range of motion. No JVD present. Cardiovascular: Normal rate, regular rhythm, S1 normal, S2 normal and normal heart sounds. Exam reveals no gallop. No murmur heard. Pulses:       Radial pulses are 2+ on the right side and 2+ on the left side. Pulmonary/Chest: Effort normal and breath sounds normal. No respiratory distress. She has no wheezes. She has no rales. Abdominal: Soft. Bowel sounds are normal. There is no abdominal tenderness. Musculoskeletal: Normal range of motion. General: No tenderness or edema. Neurological: She is alert and oriented to person, place, and time. Skin: Skin is warm and dry.    Psychiatric: She has a normal mood and affect. Her behavior is normal. Thought content normal.       Assessment:       Diagnosis Orders   1. Paroxysmal atrial fibrillation (HCC)     2. Essential hypertension     3. Abnormal EKG              Plan:      CV stable. Edema but eating lots of fast food. Likely cause. Continue lopressor 100 mg bid. Reviewed outside records 10/17 at [facility]. (Head CT negative. CTPA negative for PE. Echo showing normal LV function mild TR with RVSP 36. Myoview  negative for ischemia by scan.) GI/ Liver issues. Being seen by GI. Edema minimal in hands. Follow up 3 months.

## 2021-03-29 ENCOUNTER — OFFICE VISIT (OUTPATIENT)
Dept: CARDIOLOGY CLINIC | Age: 56
End: 2021-03-29
Payer: OTHER GOVERNMENT

## 2021-03-29 VITALS
DIASTOLIC BLOOD PRESSURE: 74 MMHG | HEART RATE: 72 BPM | SYSTOLIC BLOOD PRESSURE: 116 MMHG | TEMPERATURE: 97.3 F | HEIGHT: 69 IN | WEIGHT: 208.6 LBS | BODY MASS INDEX: 30.9 KG/M2

## 2021-03-29 DIAGNOSIS — I10 ESSENTIAL HYPERTENSION: ICD-10-CM

## 2021-03-29 DIAGNOSIS — I48.0 PAROXYSMAL ATRIAL FIBRILLATION (HCC): ICD-10-CM

## 2021-03-29 DIAGNOSIS — I48.0 PAROXYSMAL ATRIAL FIBRILLATION (HCC): Primary | ICD-10-CM

## 2021-03-29 DIAGNOSIS — N95.1 MENOPAUSAL SYMPTOMS: ICD-10-CM

## 2021-03-29 DIAGNOSIS — R94.31 ABNORMAL EKG: ICD-10-CM

## 2021-03-29 DIAGNOSIS — R53.83 OTHER FATIGUE: ICD-10-CM

## 2021-03-29 LAB
ALBUMIN SERPL-MCNC: 5 G/DL (ref 3.4–5)
ANION GAP SERPL CALCULATED.3IONS-SCNC: 14 MMOL/L (ref 3–16)
BUN BLDV-MCNC: 12 MG/DL (ref 7–20)
CALCIUM SERPL-MCNC: 10 MG/DL (ref 8.3–10.6)
CHLORIDE BLD-SCNC: 99 MMOL/L (ref 99–110)
CO2: 25 MMOL/L (ref 21–32)
CREAT SERPL-MCNC: 0.8 MG/DL (ref 0.6–1.1)
GFR AFRICAN AMERICAN: >60
GFR NON-AFRICAN AMERICAN: >60
GLUCOSE BLD-MCNC: 119 MG/DL (ref 70–99)
PHOSPHORUS: 3.9 MG/DL (ref 2.5–4.9)
POTASSIUM SERPL-SCNC: 4.3 MMOL/L (ref 3.5–5.1)
SODIUM BLD-SCNC: 138 MMOL/L (ref 136–145)

## 2021-03-29 PROCEDURE — 1036F TOBACCO NON-USER: CPT | Performed by: INTERNAL MEDICINE

## 2021-03-29 PROCEDURE — 3017F COLORECTAL CA SCREEN DOC REV: CPT | Performed by: INTERNAL MEDICINE

## 2021-03-29 PROCEDURE — G8427 DOCREV CUR MEDS BY ELIG CLIN: HCPCS | Performed by: INTERNAL MEDICINE

## 2021-03-29 PROCEDURE — 99213 OFFICE O/P EST LOW 20 MIN: CPT | Performed by: INTERNAL MEDICINE

## 2021-03-29 PROCEDURE — G8484 FLU IMMUNIZE NO ADMIN: HCPCS | Performed by: INTERNAL MEDICINE

## 2021-03-29 PROCEDURE — G8417 CALC BMI ABV UP PARAM F/U: HCPCS | Performed by: INTERNAL MEDICINE

## 2021-03-29 ASSESSMENT — ENCOUNTER SYMPTOMS
CHEST TIGHTNESS: 0
CHOKING: 0
SHORTNESS OF BREATH: 0
COUGH: 0

## 2021-03-29 NOTE — PROGRESS NOTES
Subjective:      Patient ID: Uvaldo Jo is a 63 y.o. female. Fatigue  Pertinent negatives include no chest pain, coughing or fatigue. Here for follow up afib/htn/abn ekg. Covid shot Friday. Felt palps. Wt gain. More stress. Son and daughter in law moved in. No exertional.  No syncope. No pnd or orthopnea. No fevers. No cough. Breathing same. Past Medical History:   Diagnosis Date    Alcohol abuse, in remission     Anxiety state, unspecified     Depressive disorder, not elsewhere classified     Diffuse cystic mastopathy     Extrinsic asthma, unspecified     Hypertension     Leiomyoma of uterus, unspecified     Other and unspecified ovarian cyst      Past Surgical History:   Procedure Laterality Date    CHOLECYSTECTOMY      HYSTERECTOMY       Social History     Socioeconomic History    Marital status:      Spouse name: Not on file    Number of children: Not on file    Years of education: Not on file    Highest education level: Not on file   Occupational History    Not on file   Social Needs    Financial resource strain: Not on file    Food insecurity     Worry: Not on file     Inability: Not on file    Transportation needs     Medical: Not on file     Non-medical: Not on file   Tobacco Use    Smoking status: Never Smoker    Smokeless tobacco: Never Used   Substance and Sexual Activity    Alcohol use:  Yes     Alcohol/week: 1.0 - 2.0 standard drinks     Types: 1 - 2 Glasses of wine per week     Comment: 2 drinks qd    Drug use: No    Sexual activity: Yes     Partners: Male   Lifestyle    Physical activity     Days per week: Not on file     Minutes per session: Not on file    Stress: Not on file   Relationships    Social connections     Talks on phone: Not on file     Gets together: Not on file     Attends Rastafari service: Not on file     Active member of club or organization: Not on file     Attends meetings of clubs or organizations: Not on file Relationship status: Not on file    Intimate partner violence     Fear of current or ex partner: Not on file     Emotionally abused: Not on file     Physically abused: Not on file     Forced sexual activity: Not on file   Other Topics Concern    Not on file   Social History Narrative    Not on file       FH reviewed, denies FH cardiac issues. Vitals:    03/29/21 1303   BP: 116/74   Pulse: 72   Temp: 97.3 °F (36.3 °C)       Wt 214    Review of Systems   Constitutional: Negative for activity change, appetite change and fatigue. Respiratory: Negative for cough, choking, chest tightness and shortness of breath. Cardiovascular: Negative for chest pain, palpitations and leg swelling. Denies PND or orthopnea. No tachycardia or syncope. Neurological: Negative for dizziness, syncope and light-headedness. Psychiatric/Behavioral: Negative for agitation, behavioral problems and confusion. All other systems reviewed negative as done. Objective:   Physical Exam   Constitutional: She is oriented to person, place, and time. She appears well-developed and well-nourished. No distress. HENT:   Head: Normocephalic and atraumatic. Eyes: Conjunctivae and EOM are normal. Right eye exhibits no discharge. Left eye exhibits no discharge. Neck: Normal range of motion. No JVD present. Cardiovascular: Normal rate, regular rhythm, S1 normal, S2 normal and normal heart sounds. Exam reveals no gallop. No murmur heard. Pulses:       Radial pulses are 2+ on the right side and 2+ on the left side. Pulmonary/Chest: Effort normal and breath sounds normal. No respiratory distress. She has no wheezes. She has no rales. Abdominal: Soft. Bowel sounds are normal. There is no abdominal tenderness. Musculoskeletal: Normal range of motion. General: No tenderness or edema. Neurological: She is alert and oriented to person, place, and time. Skin: Skin is warm and dry.    Psychiatric: She has a normal mood and affect. Her behavior is normal. Thought content normal.       Assessment:          Diagnosis Orders   1. Paroxysmal atrial fibrillation (HCC)  RENAL FUNCTION PANEL   2. Essential hypertension  RENAL FUNCTION PANEL   3. Abnormal EKG             Plan:      Palps since covid injection. Stress at home. Continue lopressor 100 mg bid. Extra half metoprolol for less controlled times. Will have EPl in view of increase in palp/afib. Reviewed outside records 10/17 at [facility]. (Head CT negative. CTPA negative for PE. Echo showing normal LV function mild TR with RVSP 36. Myoview  negative for ischemia by scan.) GI/ Liver issues. Being seen by GI. Edema minimal in hands. Follow up as scheduled.

## 2021-03-30 LAB — CORTISOL TOTAL: 11.1 UG/DL

## 2021-03-31 ENCOUNTER — TELEPHONE (OUTPATIENT)
Dept: INTERNAL MEDICINE CLINIC | Age: 56
End: 2021-03-31

## 2021-03-31 DIAGNOSIS — M25.50 ARTHRALGIA, UNSPECIFIED JOINT: Primary | ICD-10-CM

## 2021-03-31 LAB — FOLLICLE STIMULATING HORMONE: 53.3 MIU/ML

## 2021-03-31 NOTE — TELEPHONE ENCOUNTER
Pt states she has been referred to a Rheumatologist per a friend and just needs a referral ordered and sent from Dr. Julissa Ariza. Pt states she has been having to ice her hands due to the pain.   Please advise

## 2021-04-05 RX ORDER — ESTROGENS, CONJUGATED 0.45 MG/1
TABLET, FILM COATED ORAL
Qty: 30 TABLET | Refills: 3 | Status: SHIPPED | OUTPATIENT
Start: 2021-04-05 | End: 2021-12-20

## 2021-04-13 ENCOUNTER — TELEPHONE (OUTPATIENT)
Dept: ENT CLINIC | Age: 56
End: 2021-04-13

## 2021-04-13 DIAGNOSIS — J32.9 RECURRENT SINUSITIS: Primary | ICD-10-CM

## 2021-04-13 RX ORDER — BUTALBITAL, ACETAMINOPHEN AND CAFFEINE 50; 325; 40 MG/1; MG/1; MG/1
1 TABLET ORAL EVERY 4 HOURS PRN
Qty: 30 TABLET | Refills: 1 | Status: SHIPPED | OUTPATIENT
Start: 2021-04-13 | End: 2021-06-21

## 2021-04-13 RX ORDER — AZITHROMYCIN 250 MG/1
TABLET, FILM COATED ORAL
Qty: 1 PACKET | Refills: 0 | Status: SHIPPED | OUTPATIENT
Start: 2021-04-13 | End: 2021-04-29

## 2021-04-26 DIAGNOSIS — F51.04 CHRONIC INSOMNIA: ICD-10-CM

## 2021-04-26 RX ORDER — ZOLPIDEM TARTRATE 10 MG/1
10 TABLET ORAL NIGHTLY PRN
Qty: 30 TABLET | Refills: 1 | OUTPATIENT
Start: 2021-04-26 | End: 2022-04-26

## 2021-04-26 NOTE — TELEPHONE ENCOUNTER
Patient called in requesting refill for the following medication:    zolpidem (AMBIEN) 10 MG tablet     LORazepam (ATIVAN) 2 MG tablet     Patient has an appt 4/29 for a med check. [facility] [address] [facility], [address] [place] [address] & [address] -  [phone]    Pls advise.

## 2021-04-29 ENCOUNTER — VIRTUAL VISIT (OUTPATIENT)
Dept: INTERNAL MEDICINE CLINIC | Age: 56
End: 2021-04-29
Payer: OTHER GOVERNMENT

## 2021-04-29 DIAGNOSIS — F43.22 ADJUSTMENT DISORDER WITH ANXIETY: ICD-10-CM

## 2021-04-29 DIAGNOSIS — F41.9 ANXIETY: ICD-10-CM

## 2021-04-29 DIAGNOSIS — R89.2 ABNORMAL DRUG SCREEN: ICD-10-CM

## 2021-04-29 PROCEDURE — G8427 DOCREV CUR MEDS BY ELIG CLIN: HCPCS | Performed by: INTERNAL MEDICINE

## 2021-04-29 PROCEDURE — 99213 OFFICE O/P EST LOW 20 MIN: CPT | Performed by: INTERNAL MEDICINE

## 2021-04-29 PROCEDURE — 3017F COLORECTAL CA SCREEN DOC REV: CPT | Performed by: INTERNAL MEDICINE

## 2021-04-29 RX ORDER — LORAZEPAM 2 MG/1
TABLET ORAL
Qty: 90 TABLET | Refills: 1 | Status: CANCELLED | OUTPATIENT
Start: 2021-04-29 | End: 2021-05-09

## 2021-04-29 RX ORDER — ALPRAZOLAM 2 MG/1
TABLET ORAL
Qty: 90 TABLET | Refills: 2 | Status: SHIPPED | OUTPATIENT
Start: 2021-04-29 | End: 2021-08-03 | Stop reason: SDUPTHER

## 2021-04-29 NOTE — PROGRESS NOTES
SUBJECTIVE:  Patient ID: Tommy Mcdermott is an 63 y.o. female. HPI: Patient here today for the f/u of chronic problems-- see Problem List and associated comments. New issues or complaints include (also see Assessment for more details):      TELEHEALTH EVALUATION -- Audio/Visual (During UPAEP-30 public health emergency)    Pursuant to the emergency declaration under the [address] [place] [address] authority and the [employer] Act, this Virtual  Visit was conducted, with patient's consent, to reduce the patient's risk of exposure to COVID-19 and provide continuity of care for an established patient. Services were provided through a video synchronous discussion virtually to substitute for in-person clinic visit. Patient here to discuss her lorazepam and get a refill. She still sees her therapist routinely. She has a lot of stress, almost all of which is at home right now. She lives in a small new house with her , and her son and daughter-in-law and their 3 kids and moved in as well. Her daughter-in-law has significant mental health issues. There is tremendous stress and tension in the house and the ultimate solution could be that the kids and grandkids move out, however this is a difficult situation. Lorazepam does help but possibly not enough. Review of Systems   Constitutional:        Weight loss   Psychiatric/Behavioral: Positive for decreased concentration and sleep disturbance. Negative for dysphoric mood and suicidal ideas. The patient is nervous/anxious. OBJECTIVE:    There were no vitals taken for this visit. Physical Exam  Constitutional:       Appearance: She is not ill-appearing. Neurological:      General: No focal deficit present. Mental Status: She is alert and oriented to person, place, and time.    Psychiatric:         Attention and Perception: Attention normal. Mood and Affect: Mood is anxious. Mood is not depressed. Speech: Speech normal.         Behavior: Behavior normal. Behavior is not aggressive, withdrawn or hyperactive. Thought Content: Thought content does not include suicidal ideation. Cognition and Memory: Cognition normal.         ASSESSMENT:       Encounter Diagnoses   Name Primary?  Abnormal drug screen     Anxiety     Adjustment disorder with anxiety        Adjustment disorder with anxiety    see HPI. Very difficult situation at home with kids and grandkids. A significant amount of tension and this is causing some marital issues as well. She still sees her therapist at least weekly. Lorazepam helps some but after discussion we will try to see if she gets a little more benefit by switching to Xanax. Monitor report done. PLAN:See ASSESSMENT for evaluation & PLAN     No orders of the defined types were placed in this encounter.    , PMH, SH and FH reviewed and noted. Recent and past labs, tests and consultsalso reviewed. Recent or new meds also reviewed.

## 2021-04-29 NOTE — ASSESSMENT & PLAN NOTE
see HPI. Very difficult situation at home with kids and grandkids. A significant amount of tension and this is causing some marital issues as well. She still sees her therapist at least weekly. Lorazepam helps some but after discussion we will try to see if she gets a little more benefit by switching to Xanax. Monitor report done.

## 2021-05-04 RX ORDER — POTASSIUM CHLORIDE 20 MEQ/1
TABLET, EXTENDED RELEASE ORAL
Qty: 60 TABLET | Refills: 5 | Status: SHIPPED | OUTPATIENT
Start: 2021-05-04 | End: 2021-10-22

## 2021-05-05 ENCOUNTER — OFFICE VISIT (OUTPATIENT)
Dept: ENT CLINIC | Age: 56
End: 2021-05-05
Payer: OTHER GOVERNMENT

## 2021-05-05 VITALS — DIASTOLIC BLOOD PRESSURE: 85 MMHG | SYSTOLIC BLOOD PRESSURE: 120 MMHG | HEART RATE: 91 BPM | TEMPERATURE: 96.9 F

## 2021-05-05 DIAGNOSIS — J32.9 RECURRENT SINUSITIS: Primary | ICD-10-CM

## 2021-05-05 DIAGNOSIS — J30.9 ALLERGIC SINUSITIS: ICD-10-CM

## 2021-05-05 PROCEDURE — 1036F TOBACCO NON-USER: CPT | Performed by: OTOLARYNGOLOGY

## 2021-05-05 PROCEDURE — G8417 CALC BMI ABV UP PARAM F/U: HCPCS | Performed by: OTOLARYNGOLOGY

## 2021-05-05 PROCEDURE — 99213 OFFICE O/P EST LOW 20 MIN: CPT | Performed by: OTOLARYNGOLOGY

## 2021-05-05 PROCEDURE — G8427 DOCREV CUR MEDS BY ELIG CLIN: HCPCS | Performed by: OTOLARYNGOLOGY

## 2021-05-05 PROCEDURE — 3017F COLORECTAL CA SCREEN DOC REV: CPT | Performed by: OTOLARYNGOLOGY

## 2021-05-05 RX ORDER — FLUTICASONE PROPIONATE 50 MCG
2 SPRAY, SUSPENSION (ML) NASAL DAILY
Qty: 1 BOTTLE | Refills: 1 | Status: SHIPPED | OUTPATIENT
Start: 2021-05-05 | End: 2022-05-26

## 2021-05-05 RX ORDER — METHYLPREDNISOLONE 4 MG/1
4 TABLET ORAL SEE ADMIN INSTRUCTIONS
Qty: 1 KIT | Refills: 0 | Status: SHIPPED | OUTPATIENT
Start: 2021-05-05 | End: 2021-08-10 | Stop reason: ALTCHOICE

## 2021-05-05 RX ORDER — LEVOFLOXACIN 500 MG/1
500 TABLET, FILM COATED ORAL DAILY
Qty: 10 TABLET | Refills: 0 | Status: SHIPPED | OUTPATIENT
Start: 2021-05-05 | End: 2021-08-10 | Stop reason: ALTCHOICE

## 2021-05-05 NOTE — PROGRESS NOTES
[place] has noted a marked increase in pressure in both of her ears associated with some pain over the past 2 months. This is also associated with an increase in sinus congestion with an increase in her wheezing requiring her Breo and also the albuterol to be utilized more frequently. She has had a cough that is nonproductive. She has had no fever. No vertigo has been noted. Hearing seems to be similar to having some fluid in her ears. No tinnitus is been noted. Currently, she appears in no obvious acute distress although does seem congested. Ear examination reveals no evidence of fluid or inflammation and the ear canals are clear. Oral examination is unremarkable. The neck is free of any adenopathy, mass, thyroid enlargement. Nasal mucosa treated with cotton pledgets  impregnated with Afrin and lidocaine placed in middle meatuses against turbinates. Pledgets left in place for five minutes and removed enabling enhanced visualization. The exam revealed positive edema of mucosa. it was positive for erythema indicative of infection. There was not evidence of deviation of the septum which was not significant. There were not polyps present. .There were not other masses present. The turbinates were not enlarged beyond normal.  Findings appear to be those of sinus infection with associated allergy. We will start her on Levaquin along with Flonase nasal spray since Astelin does not seem to be helping. In addition, Medrol Dosepak will be given. I have asked that she contact me in 1 week if there is no significant improvement.

## 2021-06-01 RX ORDER — TRAZODONE HYDROCHLORIDE 100 MG/1
TABLET ORAL
Qty: 60 TABLET | Refills: 5 | Status: SHIPPED | OUTPATIENT
Start: 2021-06-01 | End: 2021-11-23

## 2021-06-01 RX ORDER — OMEPRAZOLE 40 MG/1
CAPSULE, DELAYED RELEASE ORAL
Qty: 30 CAPSULE | Refills: 5 | Status: SHIPPED | OUTPATIENT
Start: 2021-06-01 | End: 2021-11-23

## 2021-06-19 DIAGNOSIS — J32.9 RECURRENT SINUSITIS: ICD-10-CM

## 2021-06-21 RX ORDER — BUTALBITAL, ACETAMINOPHEN AND CAFFEINE 50; 325; 40 MG/1; MG/1; MG/1
TABLET ORAL
Qty: 30 TABLET | Refills: 1 | Status: SHIPPED | OUTPATIENT
Start: 2021-06-21 | End: 2021-08-10 | Stop reason: SDUPTHER

## 2021-07-09 ENCOUNTER — TELEPHONE (OUTPATIENT)
Dept: CARDIOLOGY CLINIC | Age: 56
End: 2021-07-09

## 2021-07-09 NOTE — TELEPHONE ENCOUNTER
The patient would like to know if it's ok for her to use epsom salt in her bath. Please call the patient back at [phone] to advise.

## 2021-07-12 ENCOUNTER — OFFICE VISIT (OUTPATIENT)
Dept: CARDIOLOGY CLINIC | Age: 56
End: 2021-07-12
Payer: OTHER GOVERNMENT

## 2021-07-12 VITALS
WEIGHT: 204 LBS | DIASTOLIC BLOOD PRESSURE: 82 MMHG | HEART RATE: 72 BPM | SYSTOLIC BLOOD PRESSURE: 126 MMHG | BODY MASS INDEX: 30.13 KG/M2

## 2021-07-12 DIAGNOSIS — I48.0 PAROXYSMAL ATRIAL FIBRILLATION (HCC): Primary | ICD-10-CM

## 2021-07-12 DIAGNOSIS — R94.31 ABNORMAL EKG: ICD-10-CM

## 2021-07-12 DIAGNOSIS — I10 ESSENTIAL HYPERTENSION: ICD-10-CM

## 2021-07-12 PROCEDURE — 1036F TOBACCO NON-USER: CPT | Performed by: INTERNAL MEDICINE

## 2021-07-12 PROCEDURE — G8427 DOCREV CUR MEDS BY ELIG CLIN: HCPCS | Performed by: INTERNAL MEDICINE

## 2021-07-12 PROCEDURE — G8417 CALC BMI ABV UP PARAM F/U: HCPCS | Performed by: INTERNAL MEDICINE

## 2021-07-12 PROCEDURE — 3017F COLORECTAL CA SCREEN DOC REV: CPT | Performed by: INTERNAL MEDICINE

## 2021-07-12 PROCEDURE — 99213 OFFICE O/P EST LOW 20 MIN: CPT | Performed by: INTERNAL MEDICINE

## 2021-07-12 RX ORDER — LEFLUNOMIDE 20 MG/1
20 TABLET ORAL DAILY
COMMUNITY
Start: 2021-07-08

## 2021-07-12 ASSESSMENT — ENCOUNTER SYMPTOMS
COUGH: 0
SHORTNESS OF BREATH: 0
CHEST TIGHTNESS: 0
CHOKING: 0

## 2021-07-12 NOTE — PROGRESS NOTES
Frequency of Social Gatherings with Friends and Family:     Attends Episcopalian Services:     Active Member of Clubs or Organizations:     Attends Club or Organization Meetings:     Marital Status:    Intimate Partner Violence:     Fear of Current or Ex-Partner:     Emotionally Abused:     Physically Abused:     Sexually Abused:        FH reviewed, denies FH cardiac issues. Vitals:    07/12/21 1450   BP: 126/82   Pulse: 72       Wt 214    Review of Systems   Constitutional: Negative for activity change, appetite change and fatigue. Respiratory: Negative for cough, choking, chest tightness and shortness of breath. Cardiovascular: Negative for chest pain, palpitations and leg swelling. Denies PND or orthopnea. No tachycardia or syncope. Neurological: Negative for dizziness, syncope and light-headedness. Psychiatric/Behavioral: Negative for agitation, behavioral problems and confusion. All other systems reviewed negative as done. Objective:   Physical Exam  Constitutional:       General: She is not in acute distress. Appearance: She is well-developed. HENT:      Head: Normocephalic and atraumatic. Eyes:      General:         Right eye: No discharge. Left eye: No discharge. Conjunctiva/sclera: Conjunctivae normal.   Neck:      Vascular: No JVD. Cardiovascular:      Rate and Rhythm: Normal rate and regular rhythm. Pulses:           Radial pulses are 2+ on the right side and 2+ on the left side. Heart sounds: Normal heart sounds, S1 normal and S2 normal. No murmur heard. No gallop. Pulmonary:      Effort: Pulmonary effort is normal. No respiratory distress. Breath sounds: Normal breath sounds. No wheezing or rales. Abdominal:      General: Bowel sounds are normal.      Palpations: Abdomen is soft. Tenderness: There is no abdominal tenderness. Musculoskeletal:         General: No tenderness. Normal range of motion.       Cervical back: Normal range of motion. Skin:     General: Skin is warm and dry. Neurological:      Mental Status: She is alert and oriented to person, place, and time. Psychiatric:         Behavior: Behavior normal.         Thought Content: Thought content normal.         Assessment:          Diagnosis Orders   1. Paroxysmal atrial fibrillation (HCC)     2. Essential hypertension     3. Abnormal EKG             Plan:      CV stable. Exercising now almost daily. Continue lopressor 100 mg bid. Extra half metoprolol for less controlled times. Reviewed outside records 10/17 at [facility]. (Head CT negative. CTPA negative for PE. Echo showing normal LV function mild TR with RVSP 36. Myoview  negative for ischemia by scan.) GI/ Liver issues. Follow up 3 months.

## 2021-07-20 ENCOUNTER — TELEPHONE (OUTPATIENT)
Dept: INTERNAL MEDICINE CLINIC | Age: 56
End: 2021-07-20

## 2021-07-20 RX ORDER — CLINDAMYCIN HYDROCHLORIDE 150 MG/1
150 CAPSULE ORAL 4 TIMES DAILY
Qty: 28 CAPSULE | Refills: 0 | Status: SHIPPED | OUTPATIENT
Start: 2021-07-20 | End: 2021-07-27

## 2021-07-20 NOTE — TELEPHONE ENCOUNTER
----- Message from [employer] sent at 7/20/2021 12:15 PM EDT -----  Subject: Appointment Request    Reason for Call: Routine (Patient Request) No Script    QUESTIONS  Type of Appointment? Established Patient  Reason for appointment request? Available appointments did not meet   patient need  Additional Information for Provider? Alex David 1965 [id number]   [phone]. Pt has right big toe that is bleeding and infection coming   from underneath the toe nail. Pt states that she in not having any pain.  ---------------------------------------------------------------------------  --------------  CALL BACK INFO  What is the best way for the office to contact you? OK to leave message on   voicemail  Preferred Call Back Phone Number? [phone]  ---------------------------------------------------------------------------  --------------  SCRIPT ANSWERS  Relationship to Patient? Self  Appointment reason? Symptomatic  Select script based on patient symptoms? Adult No Script  (Is the patient requesting to see the provider for a procedure?)? No  (Is the patient requesting to see the provider urgently  today or   tomorrow. )? No  Have you been diagnosed with, awaiting test results for, or told that you   are suspected of having COVID-19 (Coronavirus)? (If patient has tested   negative or was tested as a requirement for work, school, or travel and   not based on symptoms, answer no)? No  Do you currently have flu-like symptoms including fever or chills, cough,   shortness of breath, difficulty breathing, or new loss of taste or smell? No  Have you had close contact with someone with COVID-19 in the last 14 days? No  (Service Expert  click yes below to proceed with [employer] As Usual   Scheduling)?  Yes

## 2021-07-28 ENCOUNTER — TELEPHONE (OUTPATIENT)
Dept: INTERNAL MEDICINE CLINIC | Age: 56
End: 2021-07-28

## 2021-07-28 DIAGNOSIS — R60.9 WATER RETENTION: ICD-10-CM

## 2021-07-28 DIAGNOSIS — F51.04 CHRONIC INSOMNIA: ICD-10-CM

## 2021-07-28 RX ORDER — TORSEMIDE 20 MG/1
TABLET ORAL
Qty: 90 TABLET | Refills: 1 | Status: SHIPPED | OUTPATIENT
Start: 2021-07-28 | End: 2022-01-10

## 2021-07-28 RX ORDER — ZOLPIDEM TARTRATE 10 MG/1
TABLET ORAL
Qty: 15 TABLET | Refills: 0 | Status: SHIPPED | OUTPATIENT
Start: 2021-07-28 | End: 2021-08-10 | Stop reason: SDUPTHER

## 2021-07-28 RX ORDER — ZOLPIDEM TARTRATE 10 MG/1
TABLET ORAL
Qty: 15 TABLET | Refills: 0 | Status: CANCELLED | OUTPATIENT
Start: 2021-07-28 | End: 2021-08-10

## 2021-07-28 RX ORDER — APIXABAN 5 MG/1
TABLET, FILM COATED ORAL
Qty: 180 TABLET | Refills: 3 | Status: SHIPPED | OUTPATIENT
Start: 2021-07-28 | End: 2022-07-15

## 2021-07-28 NOTE — TELEPHONE ENCOUNTER
Medication Refill:     zolpidem (AMBIEN) 10 MG tablet [[id number]    Patient has a virtual visit tomorrow but has been out of this medication since 07/24/21 any has not had any sleep.           [facility] [address] [address]

## 2021-07-28 NOTE — TELEPHONE ENCOUNTER
Orders Placed This Encounter   Medications    zolpidem (AMBIEN) 10 MG tablet     Sig: TAKE 1 TABLET BY MOUTH AT NIGHT IF NEEDED FOR SLEEP     Dispense:  15 tablet     Refill:  0         Controlled Substance Monitoring:    Acute and Chronic Pain Monitoring:   RX Monitoring 7/28/2021   Attestation -   Periodic Controlled Substance Monitoring No signs of potential drug abuse or diversion identified.

## 2021-07-28 NOTE — TELEPHONE ENCOUNTER
Requested Prescriptions     Pending Prescriptions Disp Refills    ELIQUIS 5 MG TABS tablet [Pharmacy Med Name: ELIQUIS 5MG TABLETS] 180 tablet 3     Sig: TAKE 1 TABLET BY MOUTH TWICE DAILY          Number: 180    Refills: 3    Last Office Visit: 3/25/2021     Next Office Visit: 10.18.2021     Last Labs: 3.29.2021

## 2021-07-30 ENCOUNTER — TELEPHONE (OUTPATIENT)
Dept: CARDIOLOGY CLINIC | Age: 56
End: 2021-07-30

## 2021-07-30 NOTE — TELEPHONE ENCOUNTER
Spoke with patient, these medications do not interfere or have any adverse reaction to being on the Eliquis. Patient was relieved to hear this.

## 2021-08-03 DIAGNOSIS — F41.9 ANXIETY: ICD-10-CM

## 2021-08-03 RX ORDER — ALPRAZOLAM 2 MG/1
TABLET ORAL
Qty: 90 TABLET | Refills: 2 | Status: SHIPPED | OUTPATIENT
Start: 2021-08-03 | End: 2021-12-03

## 2021-08-11 ENCOUNTER — TELEPHONE (OUTPATIENT)
Dept: INTERNAL MEDICINE CLINIC | Age: 56
End: 2021-08-11

## 2021-08-11 ENCOUNTER — OFFICE VISIT (OUTPATIENT)
Dept: INTERNAL MEDICINE CLINIC | Age: 56
End: 2021-08-11
Payer: OTHER GOVERNMENT

## 2021-08-11 VITALS
SYSTOLIC BLOOD PRESSURE: 130 MMHG | BODY MASS INDEX: 27.99 KG/M2 | DIASTOLIC BLOOD PRESSURE: 70 MMHG | WEIGHT: 189 LBS | HEIGHT: 69 IN

## 2021-08-11 DIAGNOSIS — F51.04 CHRONIC INSOMNIA: ICD-10-CM

## 2021-08-11 DIAGNOSIS — I10 ESSENTIAL HYPERTENSION: ICD-10-CM

## 2021-08-11 DIAGNOSIS — F43.22 ADJUSTMENT DISORDER WITH ANXIETY: ICD-10-CM

## 2021-08-11 DIAGNOSIS — R41.89 COGNITIVE IMPAIRMENT: ICD-10-CM

## 2021-08-11 PROBLEM — R14.0 ABDOMINAL BLOATING: Status: RESOLVED | Noted: 2021-02-04 | Resolved: 2021-08-11

## 2021-08-11 PROBLEM — H01.009 BLEPHARITIS: Status: RESOLVED | Noted: 2020-06-08 | Resolved: 2021-08-11

## 2021-08-11 PROBLEM — R20.0 NUMBNESS OF TOES: Status: RESOLVED | Noted: 2018-12-12 | Resolved: 2021-08-11

## 2021-08-11 PROBLEM — R19.5 LOOSE STOOLS: Status: RESOLVED | Noted: 2018-12-12 | Resolved: 2021-08-11

## 2021-08-11 PROBLEM — M71.22 POPLITEAL CYST, LEFT: Status: RESOLVED | Noted: 2019-06-19 | Resolved: 2021-08-11

## 2021-08-11 PROCEDURE — 3017F COLORECTAL CA SCREEN DOC REV: CPT | Performed by: INTERNAL MEDICINE

## 2021-08-11 PROCEDURE — G8417 CALC BMI ABV UP PARAM F/U: HCPCS | Performed by: INTERNAL MEDICINE

## 2021-08-11 PROCEDURE — G8427 DOCREV CUR MEDS BY ELIG CLIN: HCPCS | Performed by: INTERNAL MEDICINE

## 2021-08-11 PROCEDURE — 99214 OFFICE O/P EST MOD 30 MIN: CPT | Performed by: INTERNAL MEDICINE

## 2021-08-11 PROCEDURE — 1036F TOBACCO NON-USER: CPT | Performed by: INTERNAL MEDICINE

## 2021-08-11 RX ORDER — ZOLPIDEM TARTRATE 10 MG/1
TABLET ORAL
Qty: 30 TABLET | Refills: 0 | Status: SHIPPED | OUTPATIENT
Start: 2021-08-11 | End: 2021-09-15

## 2021-08-11 ASSESSMENT — ENCOUNTER SYMPTOMS: BACK PAIN: 1

## 2021-08-11 NOTE — ASSESSMENT & PLAN NOTE
she has noticed increasing problems with cognitive issues including memory. She gets confused often during the day and has difficulty completing tasks. She is particularly concerned about some of her memory problems. Her paternal grandmother did have Alzheimer's. She is possibly seeking disability for all of her issues, including the cognitive disorder, and her rheumatoid arthritis. I have recommended she go to [address] [place] [address] for neuropsychological evaluation.

## 2021-08-11 NOTE — PROGRESS NOTES
SUBJECTIVE:  Patient ID: Mahesh Larios is an 63 y.o. female. HPI: Patient here today for the f/u of chronic problems-- see Problem List and associated comments. New issues or complaints include (also see Assessment for more details): Overall she is not doing well. Significant problems with her rheumatoid arthritis which gives her significant physical limitations, and she is having significant cognitive and memory problems. She is not sure of the source of all these problems, which could be organic or related to medications. This is been going on for a while and slowly getting worse. At times she has significant periods of confusion. She has decided to pursue disability. There is also a lot of stress at her homeher  has been diagnosed with prostate cancer, along with his other medical problems. Review of Systems   Constitutional:        She is trying to lose weight despite now being on prednisone   Cardiovascular: Negative for palpitations. Musculoskeletal: Positive for arthralgias, back pain and myalgias. Skin:        Recent paronychia of toeresolved   Neurological: Positive for headaches. Psychiatric/Behavioral: Positive for confusion, decreased concentration and sleep disturbance. Negative for suicidal ideas. The patient is nervous/anxious. OBJECTIVE:    /70 (Site: Right Upper Arm)   Ht 5' 9\" (1.753 m)   Wt 189 lb (85.7 kg)   BMI 27.91 kg/m²      Physical Exam  Constitutional:       General: She is not in acute distress. Appearance: She is not ill-appearing or toxic-appearing. Eyes:      General: No scleral icterus. Extraocular Movements: Extraocular movements intact. Cardiovascular:      Rate and Rhythm: Normal rate and regular rhythm. Heart sounds: No murmur heard. No gallop. Pulmonary:      Effort: Pulmonary effort is normal. No respiratory distress. Breath sounds: Normal breath sounds.    Abdominal:      General: Bowel sounds are normal. Palpations: Abdomen is soft. Musculoskeletal:      Right lower leg: No edema. Left lower leg: No edema. Skin:     Coloration: Skin is not pale. Neurological:      General: No focal deficit present. Mental Status: She is alert and oriented to person, place, and time. Cranial Nerves: Cranial nerves are intact. Coordination: Coordination is intact. Gait: Gait is intact. Psychiatric:         Attention and Perception: Attention normal.         Mood and Affect: Mood is depressed. Mood is not anxious. Speech: Speech normal.         Behavior: Behavior is not aggressive or withdrawn. Thought Content: Thought content is not delusional. Thought content does not include homicidal or suicidal ideation. Cognition and Memory: She exhibits impaired recent memory (Difficulty remembering some recent events). ASSESSMENT:       Encounter Diagnoses   Name Primary?  Essential hypertension     Chronic insomnia     Adjustment disorder with anxiety     Cognitive impairment        Essential hypertension    BP okay    Chronic insomnia    continues to be an issue. She believes a lot of this is from stress related issues at home. Adjustment disorder with anxiety    she feels depressed but is reactive to her current situation.  has metastatic prostate cancer, along with other medical problems including diffuse arthritic issueshe is on disability. She is having difficulty coping with her chronic pain from her rheumatoid arthritis which now involves most of the joints of her body. Additionally she has stress of her familyher son and family recently moved out of their house. She wants to work but finds that impossible due to her physical limitations. Now she is having some cognitive issues as well. She still has a therapist.    Cognitive impairment    she has noticed increasing problems with cognitive issues including memory.   She gets confused often during the day and has difficulty completing tasks. She is particularly concerned about some of her memory problems. Her paternal grandmother did have Alzheimer's. She is possibly seeking disability for all of her issues, including the cognitive disorder, and her rheumatoid arthritis. I have recommended she go to [facility] neuroscience for neuropsychological evaluation. PLAN:See ASSESSMENT for evaluation & PLAN     No orders of the defined types were placed in this encounter.    , PMH, SH and FH reviewed and noted. Recent and past labs, tests and consultsalso reviewed. Recent or new meds also reviewed.

## 2021-08-11 NOTE — TELEPHONE ENCOUNTER
Medication Refill:    zolpidem (AMBIEN) 10 MG tablet [[id number]    [facility] [place] [facility]

## 2021-08-11 NOTE — ASSESSMENT & PLAN NOTE
she feels depressed but is reactive to her current situation.  has metastatic prostate cancer, along with other medical problems including diffuse arthritic issueshe is on disability. She is having difficulty coping with her chronic pain from her rheumatoid arthritis which now involves most of the joints of her body. Additionally she has stress of her familyher son and family recently moved out of their house. She wants to work but finds that impossible due to her physical limitations. Now she is having some cognitive issues as well.   She still has a therapist.

## 2021-08-27 ENCOUNTER — TELEPHONE (OUTPATIENT)
Dept: INTERNAL MEDICINE CLINIC | Age: 56
End: 2021-08-27

## 2021-08-27 NOTE — TELEPHONE ENCOUNTER
Patient called stating she has a deep cough, stuffy nose, whole body aches , headache and weak. She has only been awake 5 hours for the last two days. I spoke to her yesterday and she was going to try to get a COVID 19 test but she states she is too weak to walk and that her  is disabled and he can not carry her. Her granddaughter was diagnosed with RSV and later went into bronchitises and pneumonia. She was exposed to this. Patient is aware that Dr. Rafaela Issa is out of the office. Can you please send this to the on call?     Please call to advise

## 2021-08-27 NOTE — TELEPHONE ENCOUNTER
RSV can certainly do this, I would try to get a covid test still. Lots of rest, fluids, tylenol, but if she is severely ill may need ED evaluation.

## 2021-08-30 ENCOUNTER — TELEPHONE (OUTPATIENT)
Dept: INTERNAL MEDICINE CLINIC | Age: 56
End: 2021-08-30

## 2021-08-30 DIAGNOSIS — Z20.822 EXPOSURE TO COVID-19 VIRUS: Primary | ICD-10-CM

## 2021-08-30 NOTE — TELEPHONE ENCOUNTER
Patient called stating that in order for her to get a COVID 19 test she has to make an appointment on the computer and it is telling her that the dates on her card from her original Covid vaccine are not right. So, she called [address] [place] [address], they told her she needs an order called in for her to get a COVID 19 test , can you please one in the system?      Please call to advise

## 2021-08-30 NOTE — TELEPHONE ENCOUNTER
Pt was notified that the order yasemin be placed in the system for her Co-vid test.     Please advise.

## 2021-09-01 ENCOUNTER — TELEPHONE (OUTPATIENT)
Dept: INTERNAL MEDICINE CLINIC | Age: 56
End: 2021-09-01

## 2021-09-01 LAB — SARS-COV-2: NOT DETECTED

## 2021-09-01 NOTE — TELEPHONE ENCOUNTER
Spoke to pt she stated she has a lot of health problems she is exhausted all she is doing is sleeping. She stated she has been sick since last Monday and coughing up green phelhm. She stated she raised 3 kids she knows there is infection.        She said even though RSV is viral what about bronchitis and pneumonia

## 2021-09-01 NOTE — TELEPHONE ENCOUNTER
If she thinks she has pneumonia, she shouldn't be treated over the phone, someone should examine her.

## 2021-09-02 ENCOUNTER — OFFICE VISIT (OUTPATIENT)
Dept: INTERNAL MEDICINE CLINIC | Age: 56
End: 2021-09-02
Payer: OTHER GOVERNMENT

## 2021-09-02 VITALS
HEIGHT: 69 IN | WEIGHT: 190.8 LBS | BODY MASS INDEX: 28.26 KG/M2 | DIASTOLIC BLOOD PRESSURE: 80 MMHG | SYSTOLIC BLOOD PRESSURE: 134 MMHG | OXYGEN SATURATION: 97 % | HEART RATE: 73 BPM

## 2021-09-02 DIAGNOSIS — B97.89 VIRAL RESPIRATORY INFECTION: Primary | ICD-10-CM

## 2021-09-02 DIAGNOSIS — J98.8 VIRAL RESPIRATORY INFECTION: Primary | ICD-10-CM

## 2021-09-02 PROCEDURE — 1036F TOBACCO NON-USER: CPT | Performed by: INTERNAL MEDICINE

## 2021-09-02 PROCEDURE — G8427 DOCREV CUR MEDS BY ELIG CLIN: HCPCS | Performed by: INTERNAL MEDICINE

## 2021-09-02 PROCEDURE — 99213 OFFICE O/P EST LOW 20 MIN: CPT | Performed by: INTERNAL MEDICINE

## 2021-09-02 PROCEDURE — G8417 CALC BMI ABV UP PARAM F/U: HCPCS | Performed by: INTERNAL MEDICINE

## 2021-09-02 PROCEDURE — 3017F COLORECTAL CA SCREEN DOC REV: CPT | Performed by: INTERNAL MEDICINE

## 2021-09-02 RX ORDER — LEVOFLOXACIN 500 MG/1
500 TABLET, FILM COATED ORAL DAILY
Qty: 7 TABLET | Refills: 0 | Status: SHIPPED | OUTPATIENT
Start: 2021-09-02 | End: 2021-09-09

## 2021-09-02 NOTE — PROGRESS NOTES
Rashi Ashraf (:  1965) is a 63 y.o. female,Established patient, here for evaluation of the following chief complaint(s):  Cough (pt c/o excessive coughing with green mucus,ear pain,sleeping,sob x 1 week  negative covid test)         ASSESSMENT/PLAN:  1. Viral respiratory infection  -Consistent with RSV, appears to be improving at this time. If her sinusitis does not clear up within the next few days, will treat for bacterial sinusitis with levofloxacin 500 mg daily x7 days. No signs of pneumonia on exam at this point. I do not think that she needs further steroids for her asthma. Return if symptoms worsen or fail to improve. Subjective   SUBJECTIVE/OBJECTIVE:  HPI    Symptoms started two  days ago (about 10 days). She has had severe fatigue, some shortness of breath, clogged sinuses, and sputum production. Both ears have been hurting as well. She has started to feel better in the last 24 to 48 hours. She is on prednisone for her rheumatologist.  She is using the Breo once a day. She was wheezing more but that has improved. She cared for her granddaughter who had RSV and became ill. Review of Systems       Objective   Physical Exam  Vitals reviewed. Constitutional:       General: She is not in acute distress. Appearance: Normal appearance. She is well-developed. HENT:      Head: Normocephalic and atraumatic. Right Ear: Tympanic membrane, ear canal and external ear normal.      Left Ear: Tympanic membrane, ear canal and external ear normal.   Cardiovascular:      Rate and Rhythm: Normal rate and regular rhythm. Heart sounds: Normal heart sounds. Pulmonary:      Effort: Pulmonary effort is normal. No respiratory distress. Breath sounds: Normal breath sounds. No wheezing. Lymphadenopathy:      Cervical: No cervical adenopathy. Skin:     General: Skin is warm and dry. Neurological:      Mental Status: She is alert.                   An electronic signature was used to authenticate this note.     --Jodi Au MD

## 2021-09-10 ENCOUNTER — TELEPHONE (OUTPATIENT)
Dept: INTERNAL MEDICINE CLINIC | Age: 56
End: 2021-09-10

## 2021-09-10 DIAGNOSIS — R51.9 ACUTE NONINTRACTABLE HEADACHE, UNSPECIFIED HEADACHE TYPE: ICD-10-CM

## 2021-09-10 RX ORDER — SULFAMETHOXAZOLE AND TRIMETHOPRIM 800; 160 MG/1; MG/1
1 TABLET ORAL 2 TIMES DAILY
Qty: 20 TABLET | Refills: 0 | Status: SHIPPED | OUTPATIENT
Start: 2021-09-10 | End: 2021-09-20

## 2021-09-10 NOTE — TELEPHONE ENCOUNTER
\"pusing\" again? Pus?     Bactrim DS    #20       1 twice daily  If this is recurrent and I would refer to podiatry for evaluation

## 2021-09-10 NOTE — TELEPHONE ENCOUNTER
Patient is calling in to say she is still having issues with her right big toe. She said its pusing again. She Is not sure what to do.           Please advise and call

## 2021-09-13 ENCOUNTER — TELEPHONE (OUTPATIENT)
Dept: INTERNAL MEDICINE CLINIC | Age: 56
End: 2021-09-13

## 2021-09-13 RX ORDER — BUTALBITAL, ACETAMINOPHEN AND CAFFEINE 50; 325; 40 MG/1; MG/1; MG/1
TABLET ORAL
Qty: 30 TABLET | OUTPATIENT
Start: 2021-09-13

## 2021-09-13 NOTE — TELEPHONE ENCOUNTER
Pharmacy called in statin that patient is allergic to    sulfamethoxazole-trimethoprim (BACTRIM DS;SEPTRA DS) 800-160 MG per tablet         Please send alternative to : Heather  [address] [facility], [address] [place] [address] & [facility] [address] [facility] -  [phone]    Please advise

## 2021-09-16 ENCOUNTER — TELEPHONE (OUTPATIENT)
Dept: INTERNAL MEDICINE CLINIC | Age: 56
End: 2021-09-16

## 2021-09-17 RX ORDER — CLINDAMYCIN HYDROCHLORIDE 300 MG/1
300 CAPSULE ORAL 3 TIMES DAILY
Qty: 21 CAPSULE | Refills: 0 | Status: SHIPPED | OUTPATIENT
Start: 2021-09-17 | End: 2021-09-24

## 2021-09-17 NOTE — TELEPHONE ENCOUNTER
Bactrim has sulfa in it-we will have to prescribe something else.   Clindamycin 300 mg #21     1 3 times daily

## 2021-09-27 NOTE — TELEPHONE ENCOUNTER
Requested Prescriptions     Pending Prescriptions Disp Refills    metoprolol tartrate (LOPRESSOR) 25 MG tablet [Pharmacy Med Name: METOPROLOL TARTRATE 25MG TABLETS] 360 tablet 3     Sig: TAKE 2 TABLETS(50 MG) BY MOUTH TWICE DAILY                Last Office Visit: 3/25/2021     Next Office Visit:10/18/2021

## 2021-10-06 ENCOUNTER — HOSPITAL ENCOUNTER (OUTPATIENT)
Dept: WOMENS IMAGING | Age: 56
Discharge: HOME | End: 2021-10-06
Payer: OTHER GOVERNMENT

## 2021-10-06 DIAGNOSIS — Z12.31 VISIT FOR SCREENING MAMMOGRAM: ICD-10-CM

## 2021-10-06 PROCEDURE — 77063 BREAST TOMOSYNTHESIS BI: CPT

## 2021-10-11 DIAGNOSIS — F51.04 CHRONIC INSOMNIA: ICD-10-CM

## 2021-10-11 RX ORDER — ZOLPIDEM TARTRATE 10 MG/1
TABLET ORAL
Qty: 30 TABLET | Refills: 0 | Status: SHIPPED | OUTPATIENT
Start: 2021-10-11 | End: 2021-12-03

## 2021-10-11 NOTE — TELEPHONE ENCOUNTER
Called pt about the mammogram and pt stated that the second round of clindamycin did not work and her nail of her big toe fell off and there was blood everywhere. She is wanting a referral for a podiatrist.       Also wants a refill on her Ambien because she is under a lot of stress and her PTSD is not great.

## 2021-10-15 RX ORDER — ZOLPIDEM TARTRATE 10 MG/1
TABLET ORAL
Qty: 30 TABLET | Refills: 0 | OUTPATIENT
Start: 2021-10-15 | End: 2021-11-15

## 2021-10-18 ENCOUNTER — OFFICE VISIT (OUTPATIENT)
Dept: CARDIOLOGY CLINIC | Age: 56
End: 2021-10-18
Payer: OTHER GOVERNMENT

## 2021-10-18 VITALS
DIASTOLIC BLOOD PRESSURE: 80 MMHG | WEIGHT: 187 LBS | OXYGEN SATURATION: 95 % | BODY MASS INDEX: 27.7 KG/M2 | HEIGHT: 69 IN | SYSTOLIC BLOOD PRESSURE: 110 MMHG | HEART RATE: 85 BPM

## 2021-10-18 DIAGNOSIS — R94.31 ABNORMAL EKG: ICD-10-CM

## 2021-10-18 DIAGNOSIS — I10 PRIMARY HYPERTENSION: ICD-10-CM

## 2021-10-18 DIAGNOSIS — I48.0 PAROXYSMAL ATRIAL FIBRILLATION (HCC): Primary | ICD-10-CM

## 2021-10-18 PROCEDURE — G8427 DOCREV CUR MEDS BY ELIG CLIN: HCPCS | Performed by: INTERNAL MEDICINE

## 2021-10-18 PROCEDURE — G8484 FLU IMMUNIZE NO ADMIN: HCPCS | Performed by: INTERNAL MEDICINE

## 2021-10-18 PROCEDURE — 1036F TOBACCO NON-USER: CPT | Performed by: INTERNAL MEDICINE

## 2021-10-18 PROCEDURE — 3017F COLORECTAL CA SCREEN DOC REV: CPT | Performed by: INTERNAL MEDICINE

## 2021-10-18 PROCEDURE — 99214 OFFICE O/P EST MOD 30 MIN: CPT | Performed by: INTERNAL MEDICINE

## 2021-10-18 PROCEDURE — G8417 CALC BMI ABV UP PARAM F/U: HCPCS | Performed by: INTERNAL MEDICINE

## 2021-10-18 RX ORDER — ACETAMINOPHEN AND CODEINE PHOSPHATE 300; 30 MG/1; MG/1
1-2 TABLET ORAL EVERY 6 HOURS PRN
COMMUNITY
Start: 2021-10-13 | End: 2021-11-10

## 2021-10-18 RX ORDER — PREDNISONE 1 MG/1
1 TABLET ORAL DAILY
COMMUNITY
Start: 2021-09-15 | End: 2021-11-18 | Stop reason: ALTCHOICE

## 2021-10-18 ASSESSMENT — ENCOUNTER SYMPTOMS
SHORTNESS OF BREATH: 0
CHOKING: 0
COUGH: 0
CHEST TIGHTNESS: 0

## 2021-10-18 NOTE — PROGRESS NOTES
Subjective:      Patient ID: Sweta Hyde is a 61 y.o. female. Fatigue  Pertinent negatives include no chest pain, coughing or fatigue. Here for follow up afib/htn/abn ekg. Exercising now. Almost daily. Loosing wt. No exertional.  No syncope. No pnd or orthopnea. No fevers. No cough. Breathing stable. Rhythm stable. bp reasonable. Past Medical History:   Diagnosis Date    Alcohol abuse, in remission     Anxiety state, unspecified     Depressive disorder, not elsewhere classified     Diffuse cystic mastopathy     Extrinsic asthma, unspecified     Hypertension     Leiomyoma of uterus, unspecified     Other and unspecified ovarian cyst      Past Surgical History:   Procedure Laterality Date    CHOLECYSTECTOMY      HYSTERECTOMY       Social History     Socioeconomic History    Marital status:      Spouse name: Not on file    Number of children: Not on file    Years of education: Not on file    Highest education level: Not on file   Occupational History    Not on file   Tobacco Use    Smoking status: Never Smoker    Smokeless tobacco: Never Used   Substance and Sexual Activity    Alcohol use: Yes     Alcohol/week: 1.0 - 2.0 standard drinks     Types: 1 - 2 Glasses of wine per week     Comment: 2 drinks qd    Drug use: No    Sexual activity: Yes     Partners: Male   Other Topics Concern    Not on file   Social History Narrative    Not on file     Social Determinants of Health     Financial Resource Strain: Low Risk     Difficulty of Paying Living Expenses: Not hard at all   Food Insecurity: No Food Insecurity    Worried About Running Out of Food in the Last Year: Never true    [address] in the Last Year: Never true   Transportation Needs:     Lack of Transportation (Medical):      Lack of Transportation (Non-Medical):    Physical Activity:     Days of Exercise per Week:     Minutes of Exercise per Session:    Stress:     Feeling of Stress :    Social Musculoskeletal:         General: No tenderness. Normal range of motion. Cervical back: Normal range of motion. Skin:     General: Skin is warm and dry. Neurological:      Mental Status: She is alert and oriented to person, place, and time. Psychiatric:         Behavior: Behavior normal.         Thought Content: Thought content normal.         Assessment:       Diagnosis Orders   1. Paroxysmal atrial fibrillation (HCC)     2. Primary hypertension     3. Abnormal EKG              Plan:      CV stable. Exercising now almost daily. Continue lopressor 100 mg bid. Extra half metoprolol for less controlled times. Will continue Eliquis for CVA proph. Will continue lopressor/flecanide for afib. Reviewed outside records 10/17 at [facility]. (Head CT negative. CTPA negative for PE. Echo showing normal LV function mild TR with RVSP 36. Myoview  negative for ischemia by scan.) GI/ Liver issues. Follow up 3 months.

## 2021-10-22 RX ORDER — POTASSIUM CHLORIDE 20 MEQ/1
TABLET, EXTENDED RELEASE ORAL
Qty: 60 TABLET | Refills: 5 | Status: SHIPPED | OUTPATIENT
Start: 2021-10-22 | End: 2022-04-11

## 2021-11-18 ENCOUNTER — NURSE TRIAGE (OUTPATIENT)
Dept: OTHER | Facility: CLINIC | Age: 56
End: 2021-11-18

## 2021-11-18 ENCOUNTER — OFFICE VISIT (OUTPATIENT)
Dept: INTERNAL MEDICINE CLINIC | Age: 56
End: 2021-11-18
Payer: OTHER GOVERNMENT

## 2021-11-18 VITALS
DIASTOLIC BLOOD PRESSURE: 70 MMHG | TEMPERATURE: 97.5 F | OXYGEN SATURATION: 97 % | SYSTOLIC BLOOD PRESSURE: 130 MMHG | WEIGHT: 185.8 LBS | HEIGHT: 69 IN | BODY MASS INDEX: 27.52 KG/M2 | HEART RATE: 77 BPM

## 2021-11-18 DIAGNOSIS — I48.0 PAROXYSMAL ATRIAL FIBRILLATION (HCC): ICD-10-CM

## 2021-11-18 DIAGNOSIS — L85.3 DRY SKIN DERMATITIS: ICD-10-CM

## 2021-11-18 DIAGNOSIS — F51.01 PRIMARY INSOMNIA: ICD-10-CM

## 2021-11-18 DIAGNOSIS — I10 ESSENTIAL HYPERTENSION: ICD-10-CM

## 2021-11-18 DIAGNOSIS — R23.3: Primary | ICD-10-CM

## 2021-11-18 DIAGNOSIS — K21.9 GASTROESOPHAGEAL REFLUX DISEASE WITHOUT ESOPHAGITIS: ICD-10-CM

## 2021-11-18 PROCEDURE — G8427 DOCREV CUR MEDS BY ELIG CLIN: HCPCS | Performed by: INTERNAL MEDICINE

## 2021-11-18 PROCEDURE — G8484 FLU IMMUNIZE NO ADMIN: HCPCS | Performed by: INTERNAL MEDICINE

## 2021-11-18 PROCEDURE — 99203 OFFICE O/P NEW LOW 30 MIN: CPT | Performed by: INTERNAL MEDICINE

## 2021-11-18 PROCEDURE — 1036F TOBACCO NON-USER: CPT | Performed by: INTERNAL MEDICINE

## 2021-11-18 PROCEDURE — 3017F COLORECTAL CA SCREEN DOC REV: CPT | Performed by: INTERNAL MEDICINE

## 2021-11-18 PROCEDURE — G8417 CALC BMI ABV UP PARAM F/U: HCPCS | Performed by: INTERNAL MEDICINE

## 2021-11-18 RX ORDER — PREDNISONE 1 MG/1
4 TABLET ORAL DAILY
COMMUNITY

## 2021-11-18 ASSESSMENT — ENCOUNTER SYMPTOMS
TROUBLE SWALLOWING: 0
SHORTNESS OF BREATH: 0
BLOOD IN STOOL: 0
SORE THROAT: 0
NAUSEA: 0
WHEEZING: 0
SINUS PRESSURE: 0
EYE DISCHARGE: 0
EYE PAIN: 0
SINUS PAIN: 0
ABDOMINAL PAIN: 0
COUGH: 0
VOMITING: 0
RHINORRHEA: 0
CHEST TIGHTNESS: 0

## 2021-11-18 NOTE — PATIENT INSTRUCTIONS
No harsh chemical exposure and make sure wearing yellow gloves with the kitchen and laundry detergent as well as frequent handwashing only use Dove soap. Use only warm or cold water. Blood work tomorrow. Eucerin cream 3 or 4 times a day or little bit after each hand wash. No Dial soap. No hand  unless emergency situation. 64 ounce water from 8 AM to 6 PM.    Extensive counseling done to keep avoiding spicy, acidic tomato based foods or fatty foods like chocolate, citrus fruits (oranges, grapefruit etc.) and fruit juices. Limit intake of coffee, tea, alcohol and Robe to one a day after food only. Watch your weight. Being overweight increases intra-abdominal pressure - which can aggravate heartburn or reflux. Don't gorge yourself at meal time. Eat smaller meals. Wait for 2 hours for exercise after eating. No eating or drinking (not even water) for 3-4 hours before lying down. May elevate head of bed with blocks , if needed. No caffeine    May have half ambien ---5mg at night    Discussed use, benefit, and side effects of prescribed medications. Barriers to compliance discussed. All patient questions answered. Pt voiced understanding. IF YOU NEED A PRESCRIPTION REFILL, THEN PLEASE GIVE US THREE WORKING DAYS TO REFILL A PRESCRIPTION.   Office Hours to answer questions--Tuesday thru Friday --9.00 AM to 4.00 PM

## 2021-11-18 NOTE — TELEPHONE ENCOUNTER
Received call from Bethany Elizabeth at [facility] with [employer] Complaint. Brief description of triage: Fingers with bruising and bleeding under nails. Nails appear to be lifting up off the finger and all fingers are swollen. Pain is 9/10. Triage indicates for patient to Go to PCP office now. Care advice provided, patient verbalizes understanding; denies any other questions or concerns; instructed to call back for any new or worsening symptoms. Writer provided warm transfer to Armstrong at [facility] for appointment scheduling. Attention Provider: Thank you for allowing me to participate in the care of your patient. The patient was connected to triage in response to information provided to the [facility]/PSC. Please do not respond through this encounter as the response is not directed to a shared pool. Reason for Disposition   SEVERE pain (e.g., excruciating, unable to use hand at all)    Answer Assessment - Initial Assessment Questions  1. ONSET: \"When did the pain start? \"      About a week ago  2. LOCATION: \"Where is the pain located? \"      Fingers and nails on both hands. Bruising near tips of fingers, nails appear to be lifting up and some blood under the nails. 3. PAIN: \"How bad is the pain? \" (Scale 1-10; or mild, moderate, severe)    - MILD (1-3): doesn't interfere with normal activities    - MODERATE (4-7): interferes with normal activities (e.g., work or school) or awakens from sleep    - SEVERE (8-10): excruciating pain, unable to use hand at all      9/10    4. WORK OR EXERCISE: \"Has there been any recent work or exercise that involved this part of the body? \"      Washing hands a lot d/t  going through cancer treatment    5. CAUSE: \"What do you think is causing the pain? \"      Possibly a new medication or an infection    6. AGGRAVATING FACTORS: \"What makes the pain worse? \" (e.g., using computer)      \"Using my fingers\"  7. OTHER SYMPTOMS: \"Do you have any other symptoms? \" (e.g., neck pain, swelling, rash, numbness, fever)      Fingers are swollen    8. PREGNANCY: \"Is there any chance you are pregnant? \" \"When was your last menstrual period? \"      NA    Protocols used: HAND AND WRIST PAIN-ADULT-[place]

## 2021-11-18 NOTE — PROGRESS NOTES
2021    Edelmira White (: 1965) is a 61 y.o. female, here for evaluation of the following medical concerns:    Chief Complaint   Patient presents with    Nail Problem     bruising and bleeding undernails and pulling away from nails x 2 weeks       RA --Dr Danielle Lloyd saw dr --he did not think  Nail hge--Dr Ena Mathews mth ago--labs ago--ok    Bruise under nail--has nail polishes--she has been using Dial soap and washing hands and take get of her  and trying to avoid the infection and skin is cracking and under the left thumb with same big crack and it is getting eczematous changes. Eliquis--nails hurt    Kidney surgery--Hydronephrosis----[facility] obst--released ? Gastroesophageal Reflux Disease:    Watching gastroesophageal reflux diet. Taking medications as prescribed without any side effects. Working on trying to lose weight. Working on trying not to eat for 3 to 4 hours before lying down. Hypertension    Watching low-sodium diet. Taking blood pressure medications regularly. Blood pressure checked off and on and trying to keep a goal of blood pressure less than 130/85 most of the time. Denies any chest pain / palpitation / shortness of breath / lightheadedness etc.   The available labs reviewed and analyzed and independent interpretation of the results explained at length.   Lab Results       Component                Value               Date                       NA                       138                 2021                 K                        4.3                 2021                 K                        3.8                 2021                 CL                       99                  2021                 CO2                      25                  2021                 BUN                      12                  2021                 CREATININE               0.8                 2021                 GLUCOSE 119                 03/29/2021                 CALCIUM                  10.0                03/29/2021                Insomnia:     Reminded to avoid any caffeine for 10 to 12 hours before sleeping. She has been drinking too much caffeine and taking Ambien 10 mg explained to her that decrease Ambien to 5 mg and  Avoid tea coffee pop cola or chocolate. Keep proper sleep hygiene as explained. Taking sleeping medications regularly as recommended. Not noticing any side effects from medication. No falls or drowsiness next day. Anxiety wise says she is taking Xanax 2 million explained to her that does too much medication with the risk of sleepwalking and she needs to cut back on Ambien and see how that does and she says she needs these medications she cannot function without those. She understand the risk of addiction. Review of Systems   Constitutional: Negative for appetite change, chills, fever and unexpected weight change. HENT: Negative for congestion, ear discharge, ear pain, nosebleeds, rhinorrhea, sinus pressure, sinus pain, sore throat and trouble swallowing. Eyes: Negative for pain and discharge. Respiratory: Negative for cough, chest tightness, shortness of breath and wheezing. Cardiovascular: Negative for chest pain, palpitations and leg swelling. Gastrointestinal: Negative for abdominal pain, blood in stool, nausea and vomiting. Endocrine: Negative for polydipsia and polyphagia. Genitourinary: Negative for difficulty urinating, enuresis, flank pain and hematuria. Musculoskeletal: Negative for myalgias. Skin: Positive for wound. Negative for rash. Neurological: Negative for facial asymmetry, weakness, light-headedness, numbness and headaches. Psychiatric/Behavioral: Negative for confusion.        Current Outpatient Medications on File Prior to Visit   Medication Sig Dispense Refill    predniSONE (DELTASONE) 1 MG tablet Take 4 mg by mouth daily      potassium chloride (KLOR-CON M) 20 MEQ extended release tablet TAKE 1 TABLET BY MOUTH TWICE DAILY 60 tablet 5    rifaximin (XIFAXAN) 550 MG tablet Take 550 mg by mouth 3 times daily      metoprolol tartrate (LOPRESSOR) 25 MG tablet TAKE 2 TABLETS(50 MG) BY MOUTH TWICE DAILY 360 tablet 3    ALPRAZolam (XANAX) 2 MG tablet 1/2 to 1 tab TID prn anxiety 90 tablet 2    ELIQUIS 5 MG TABS tablet TAKE 1 TABLET BY MOUTH TWICE DAILY 180 tablet 3    torsemide (DEMADEX) 20 MG tablet TAKE 1 TABLET BY MOUTH DAILY AS NEEDED FOR WATER RETENTION (Patient taking differently: Take 20 mg by mouth daily ) 90 tablet 1    leflunomide (ARAVA) 20 MG tablet Take 20 mg by mouth daily      omeprazole (PRILOSEC) 40 MG delayed release capsule TAKE 1 CAPSULE BY MOUTH EVERY DAY 30 capsule 5    traZODone (DESYREL) 100 MG tablet TAKE 2 TABLETS BY MOUTH AT BEDTIME 60 tablet 5    fluticasone (FLONASE) 50 MCG/ACT nasal spray 2 sprays by Nasal route daily 1 Bottle 1    PREMARIN 0.45 MG tablet TAKE 1 TABLET BY MOUTH DAILY (Patient taking differently: Take 0.625 mg by mouth daily ) 30 tablet 3    butalbital-acetaminophen-caffeine (FIORICET, ESGIC) -40 MG per tablet Take 1 tablet by mouth 2 times daily as needed for Headaches 20 tablet 0    dicyclomine (BENTYL) 10 MG capsule Take 1 capsule by mouth 3 times daily as needed (abdominal cramping) 30 capsule 0    flecainide (TAMBOCOR) 50 MG tablet Take 1 tablet by mouth 2 times daily 180 tablet 3    albuterol sulfate  (90 Base) MCG/ACT inhaler INHALE 2 PUFFS EVERY 4 HOURS WHILE AWAKE FOR 7-10 DAYS, THEN EVERY 6 HOURS AS NEEDED FOR WHEEZING 6.7 g 1    fluticasone-vilanterol (BREO ELLIPTA) 100-25 MCG/INH AEPB inhaler Inhale 1 puff into the lungs daily 1 each 5    ketotifen (ZADITOR) 0.025 % ophthalmic solution INSTILL ONE DROP IN THE MORNING AND ONE DROP AT NIGHT INTO BOTH EYES  6     No current facility-administered medications on file prior to visit.       Allergies   Allergen Reactions    Amlodipine Other (See Comments)     edema    Bystolic [Nebivolol Hcl] Other (See Comments)     Hair falling out    Demerol Hives    Dilaudid [Hydromorphone Hcl] Hives    Losartan Other (See Comments)     Possible arthralgias    Penicillins     Spironolactone Other (See Comments) and Hives     ?  Sulfa Antibiotics     Iodides Nausea And Vomiting     Radioactive dye, sever h/a high b/p     Past Medical History:   Diagnosis Date    Alcohol abuse, in remission     Anxiety state, unspecified     Depressive disorder, not elsewhere classified     Diffuse cystic mastopathy     Extrinsic asthma, unspecified     Hypertension     Leiomyoma of uterus, unspecified     Other and unspecified ovarian cyst      Past Surgical History:   Procedure Laterality Date    CHOLECYSTECTOMY      HYSTERECTOMY        Social History     Tobacco Use    Smoking status: Never Smoker    Smokeless tobacco: Never Used   Substance Use Topics    Alcohol use: Yes     Alcohol/week: 1.0 - 2.0 standard drink     Types: 1 - 2 Glasses of wine per week     Comment: 2 drinks qd      History reviewed. No pertinent family history. Vitals:    11/18/21 1654   BP: 130/70   Site: Left Upper Arm   Position: Sitting   Cuff Size: Large Adult   Pulse: 77   Temp: 97.5 °F (36.4 °C)   TempSrc: Temporal   SpO2: 97%   Weight: 185 lb 12.8 oz (84.3 kg)   Height: 5' 9\" (1.753 m)     Estimated body mass index is 27.44 kg/m² as calculated from the following:    Height as of this encounter: 5' 9\" (1.753 m). Weight as of this encounter: 185 lb 12.8 oz (84.3 kg). Physical Exam  Vitals and nursing note reviewed. Constitutional:       General: She is not in acute distress. Appearance: She is well-developed. HENT:      Head: Normocephalic and atraumatic. Right Ear: External ear normal.      Left Ear: External ear normal.   Eyes:      General: Lids are normal. No scleral icterus. Right eye: No discharge. Left eye: No discharge. laundry detergent as well as frequent handwashing only use Dove soap. Use only warm or cold water. Blood work tomorrow. Eucerin cream 3 or 4 times a day or little bit after each hand wash. No Dial soap. No hand  unless emergency situation. 64 ounce water from 8 AM to 6 PM.    Extensive counseling done to keep avoiding spicy, acidic tomato based foods or fatty foods like chocolate, citrus fruits (oranges, grapefruit etc.) and fruit juices. Limit intake of coffee, tea, alcohol and Robe to one a day after food only. Watch your weight. Being overweight increases intra-abdominal pressure - which can aggravate heartburn or reflux. Don't gorge yourself at meal time. Eat smaller meals. Wait for 2 hours for exercise after eating. No eating or drinking (not even water) for 3-4 hours before lying down. May elevate head of bed with blocks , if needed. No caffeine    May have half ambien ---5mg at night    Discussed use, benefit, and side effects of prescribed medications. Barriers to compliance discussed. All patient questions answered. Pt voiced understanding. IF YOU NEED A PRESCRIPTION REFILL, THEN PLEASE GIVE US THREE WORKING DAYS TO REFILL A PRESCRIPTION. Office Hours to answer questions--Tuesday thru Friday --9.00 AM to 4.00 PM                   Electronically signed by Shellie Baum MD on 11/18/2021 at 5:34 PM     This dictation was generated by voice recognition computer software. Although all attempts are made to edit the dictation for accuracy, there may be errors in the transcription that are not intended.

## 2021-11-19 DIAGNOSIS — R23.3: ICD-10-CM

## 2021-11-19 LAB
A/G RATIO: 1.7 (ref 1.1–2.2)
ALBUMIN SERPL-MCNC: 4.6 G/DL (ref 3.4–5)
ALP BLD-CCNC: 86 U/L (ref 40–129)
ALT SERPL-CCNC: 35 U/L (ref 10–40)
ANION GAP SERPL CALCULATED.3IONS-SCNC: 14 MMOL/L (ref 3–16)
AST SERPL-CCNC: 31 U/L (ref 15–37)
BASOPHILS ABSOLUTE: 0 K/UL (ref 0–0.2)
BASOPHILS RELATIVE PERCENT: 0.6 %
BILIRUB SERPL-MCNC: 0.6 MG/DL (ref 0–1)
BUN BLDV-MCNC: 11 MG/DL (ref 7–20)
CALCIUM SERPL-MCNC: 9 MG/DL (ref 8.3–10.6)
CHLORIDE BLD-SCNC: 100 MMOL/L (ref 99–110)
CO2: 26 MMOL/L (ref 21–32)
CREAT SERPL-MCNC: 0.8 MG/DL (ref 0.6–1.1)
EOSINOPHILS ABSOLUTE: 0.1 K/UL (ref 0–0.6)
EOSINOPHILS RELATIVE PERCENT: 1.2 %
GFR AFRICAN AMERICAN: >60
GFR NON-AFRICAN AMERICAN: >60
GLUCOSE BLD-MCNC: 120 MG/DL (ref 70–99)
HCT VFR BLD CALC: 44 % (ref 36–48)
HEMOGLOBIN: 15 G/DL (ref 12–16)
LYMPHOCYTES ABSOLUTE: 2.4 K/UL (ref 1–5.1)
LYMPHOCYTES RELATIVE PERCENT: 43.6 %
MCH RBC QN AUTO: 33.7 PG (ref 26–34)
MCHC RBC AUTO-ENTMCNC: 34.2 G/DL (ref 31–36)
MCV RBC AUTO: 98.4 FL (ref 80–100)
MONOCYTES ABSOLUTE: 0.5 K/UL (ref 0–1.3)
MONOCYTES RELATIVE PERCENT: 9.5 %
NEUTROPHILS ABSOLUTE: 2.5 K/UL (ref 1.7–7.7)
NEUTROPHILS RELATIVE PERCENT: 45.1 %
PDW BLD-RTO: 13.4 % (ref 12.4–15.4)
PLATELET # BLD: 227 K/UL (ref 135–450)
PMV BLD AUTO: 8.6 FL (ref 5–10.5)
POTASSIUM SERPL-SCNC: 4.2 MMOL/L (ref 3.5–5.1)
RBC # BLD: 4.47 M/UL (ref 4–5.2)
SODIUM BLD-SCNC: 140 MMOL/L (ref 136–145)
TOTAL PROTEIN: 7.3 G/DL (ref 6.4–8.2)
WBC # BLD: 5.5 K/UL (ref 4–11)

## 2021-11-23 DIAGNOSIS — F32.1 CURRENT MODERATE EPISODE OF MAJOR DEPRESSIVE DISORDER, UNSPECIFIED WHETHER RECURRENT (HCC): ICD-10-CM

## 2021-11-23 DIAGNOSIS — F43.22 ADJUSTMENT DISORDER WITH ANXIETY: ICD-10-CM

## 2021-11-23 DIAGNOSIS — F51.04 CHRONIC INSOMNIA: Primary | ICD-10-CM

## 2021-11-23 RX ORDER — TRAZODONE HYDROCHLORIDE 100 MG/1
TABLET ORAL
Qty: 60 TABLET | Refills: 0 | Status: SHIPPED | OUTPATIENT
Start: 2021-11-23 | End: 2021-12-15

## 2021-11-23 RX ORDER — OMEPRAZOLE 40 MG/1
CAPSULE, DELAYED RELEASE ORAL
Qty: 30 CAPSULE | Refills: 0 | Status: SHIPPED | OUTPATIENT
Start: 2021-11-23 | End: 2021-12-15

## 2021-11-23 NOTE — TELEPHONE ENCOUNTER
She needs to see a psychiatrist as soon as possible. She is taking high dosage of anxiety/depression meds. Doing a referral in her chart --she can see on my chart -- Name and phone numbers --she needs to call and schedule follow-up with them as soon as possible.

## 2021-11-29 ENCOUNTER — TELEPHONE (OUTPATIENT)
Dept: INTERNAL MEDICINE CLINIC | Age: 56
End: 2021-11-29

## 2021-11-29 NOTE — TELEPHONE ENCOUNTER
----- Message from Marck Cai sent at 11/29/2021 11:23 AM EST -----  Subject: Message to Provider    QUESTIONS  Information for Provider? Patient would like to establish with another   doctor in the practice. She is not happy with Dr. Radha German. She is also   concerned about him changing all her medications. Would another doctor in   the practice accept her as a new patient? Please reach out to patient.  ---------------------------------------------------------------------------  --------------  CALL BACK INFO  What is the best way for the office to contact you? OK to leave message on   voicemail  Preferred Call Back Phone Number? [phone]  ---------------------------------------------------------------------------  --------------  SCRIPT ANSWERS  Relationship to Patient?  Self

## 2021-12-03 DIAGNOSIS — F51.04 CHRONIC INSOMNIA: ICD-10-CM

## 2021-12-03 DIAGNOSIS — F41.9 ANXIETY: ICD-10-CM

## 2021-12-03 RX ORDER — ALPRAZOLAM 2 MG/1
TABLET ORAL
Qty: 90 TABLET | Refills: 0 | Status: SHIPPED | OUTPATIENT
Start: 2021-12-03 | End: 2022-02-09

## 2021-12-03 RX ORDER — ZOLPIDEM TARTRATE 10 MG/1
TABLET ORAL
Qty: 30 TABLET | Refills: 0 | Status: SHIPPED | OUTPATIENT
Start: 2021-12-03 | End: 2022-01-18 | Stop reason: SDUPTHER

## 2021-12-03 NOTE — TELEPHONE ENCOUNTER
Patient called in concerned about getting her refill for her medications. Patient needs them refilled before the weekend. Pls advise.

## 2021-12-03 NOTE — TELEPHONE ENCOUNTER
She needs to find a psychiatrist as soon as possible who can continue high dose of medications. Otherwise she does need to wean down on medication as these high-dose medication is not going to be continued and refills will be half the amount of this medication dosage.

## 2021-12-15 RX ORDER — FLECAINIDE ACETATE 50 MG/1
50 TABLET ORAL 2 TIMES DAILY
Qty: 180 TABLET | Refills: 3 | Status: SHIPPED | OUTPATIENT
Start: 2021-12-15

## 2021-12-15 RX ORDER — TRAZODONE HYDROCHLORIDE 100 MG/1
TABLET ORAL
Qty: 60 TABLET | Refills: 0 | Status: SHIPPED | OUTPATIENT
Start: 2021-12-15 | End: 2022-01-10

## 2021-12-15 RX ORDER — OMEPRAZOLE 40 MG/1
CAPSULE, DELAYED RELEASE ORAL
Qty: 30 CAPSULE | Refills: 0 | Status: SHIPPED | OUTPATIENT
Start: 2021-12-15 | End: 2022-01-10

## 2021-12-15 NOTE — TELEPHONE ENCOUNTER
Requested Prescriptions     Pending Prescriptions Disp Refills    flecainide (TAMBOCOR) 50 MG tablet 180 tablet 3     Sig: Take 1 tablet by mouth 2 times daily                Last Office Visit: 3/25/2021     Next Office Visit: 01/24/2022

## 2021-12-20 ENCOUNTER — OFFICE VISIT (OUTPATIENT)
Dept: INTERNAL MEDICINE CLINIC | Age: 56
End: 2021-12-20
Payer: OTHER GOVERNMENT

## 2021-12-20 VITALS
HEIGHT: 69 IN | TEMPERATURE: 97.2 F | BODY MASS INDEX: 26.78 KG/M2 | DIASTOLIC BLOOD PRESSURE: 88 MMHG | SYSTOLIC BLOOD PRESSURE: 128 MMHG | OXYGEN SATURATION: 96 % | WEIGHT: 180.8 LBS | HEART RATE: 77 BPM

## 2021-12-20 DIAGNOSIS — L60.9 NAIL DISEASE: ICD-10-CM

## 2021-12-20 DIAGNOSIS — I48.0 PAROXYSMAL ATRIAL FIBRILLATION (HCC): Primary | ICD-10-CM

## 2021-12-20 PROCEDURE — G8417 CALC BMI ABV UP PARAM F/U: HCPCS | Performed by: HOSPITALIST

## 2021-12-20 PROCEDURE — 3017F COLORECTAL CA SCREEN DOC REV: CPT | Performed by: HOSPITALIST

## 2021-12-20 PROCEDURE — G8484 FLU IMMUNIZE NO ADMIN: HCPCS | Performed by: HOSPITALIST

## 2021-12-20 PROCEDURE — 1036F TOBACCO NON-USER: CPT | Performed by: HOSPITALIST

## 2021-12-20 PROCEDURE — G8427 DOCREV CUR MEDS BY ELIG CLIN: HCPCS | Performed by: HOSPITALIST

## 2021-12-20 PROCEDURE — 99213 OFFICE O/P EST LOW 20 MIN: CPT | Performed by: HOSPITALIST

## 2021-12-20 ASSESSMENT — ENCOUNTER SYMPTOMS
ABDOMINAL DISTENTION: 0
ABDOMINAL PAIN: 0
VOICE CHANGE: 0
WHEEZING: 0
DIARRHEA: 0
SINUS PAIN: 0
NAUSEA: 0
COUGH: 0
SINUS PRESSURE: 0
BACK PAIN: 0
VOMITING: 0
CONSTIPATION: 0
SHORTNESS OF BREATH: 0
SORE THROAT: 0
CHEST TIGHTNESS: 0
TROUBLE SWALLOWING: 0
BLOOD IN STOOL: 0

## 2021-12-20 NOTE — PROGRESS NOTES
[facility]  Establish care visit   2021    Kelvin Snow (:  1965) is a 61 y.o. female, here to establish care. Chief Complaint   Patient presents with   Hermelinda Goldman Establish Care    Rash     on both hands under the nails, nail bed lifted         Patient Active Problem List   Diagnosis    Asthma    Diffuse cystic mastopathy    Gestational diabetes    Essential hypertension    Chronic insomnia    Alcohol abuse    Adjustment disorder with anxiety    Menopausal symptoms    GERD (gastroesophageal reflux disease)    Overweight (BMI 25.0-29. 9)    Obstructive sleep apnea syndrome    Paroxysmal atrial fibrillation (HCC)    Environmental allergies    Other fatigue    Steatohepatitis    Attention deficit disorder (ADD)    Cognitive impairment    Dry skin dermatitis    Gastroesophageal reflux disease without esophagitis    Primary insomnia       HPI  -RA:  Diagnosed thirty years ago. Currently on Arava and prednisone. Sees Dr. Jonna Ye once monthly. -Afib: Well controlled on lopressor/flecanide. Eliquis for CVA prophylaxis.      -Hx of MDD/PTSD:  Father  in . Nasty divorce . Then there was a stalker. Son is retired  and has PTSD. She has night terrors. She is on trazadone 100, ambien 10, prn Xanex. ROS  Review of Systems   Constitutional: Positive for fatigue. Negative for activity change, appetite change, chills, diaphoresis, fever and unexpected weight change. HENT: Negative for sinus pressure, sinus pain, sore throat, trouble swallowing and voice change. Eyes: Negative for visual disturbance. Respiratory: Negative for cough, chest tightness, shortness of breath and wheezing. Cardiovascular: Negative for chest pain, palpitations and leg swelling. Gastrointestinal: Negative for abdominal distention, abdominal pain, blood in stool, constipation, diarrhea, nausea and vomiting. Endocrine: Negative for polydipsia and polyphagia. Genitourinary: Negative for decreased urine volume, difficulty urinating, dysuria and urgency. Musculoskeletal: Positive for arthralgias. Negative for back pain, gait problem, joint swelling and myalgias. Neurological: Negative for dizziness, seizures, syncope, light-headedness and headaches. Psychiatric/Behavioral: Positive for dysphoric mood. Negative for agitation, behavioral problems, confusion and suicidal ideas. The patient is nervous/anxious.          HISTORIES  Current Outpatient Medications on File Prior to Visit   Medication Sig Dispense Refill    traZODone (DESYREL) 100 MG tablet TAKE 2 TABLETS BY MOUTH AT BEDTIME 60 tablet 0    omeprazole (PRILOSEC) 40 MG delayed release capsule TAKE 1 CAPSULE BY MOUTH EVERY DAY 30 capsule 0    flecainide (TAMBOCOR) 50 MG tablet Take 1 tablet by mouth 2 times daily 180 tablet 3    zolpidem (AMBIEN) 10 MG tablet TAKE 1 TABLET BY MOUTH EVERY NIGHT AS NEEDED FOR SLEEP 30 tablet 0    ALPRAZolam (XANAX) 2 MG tablet TAKE 1/2 TO 1 TABLET BY MOUTH THREE TIMES DAILY AS NEEDED FOR ANXIETY 90 tablet 0    predniSONE (DELTASONE) 1 MG tablet Take 4 mg by mouth daily      potassium chloride (KLOR-CON M) 20 MEQ extended release tablet TAKE 1 TABLET BY MOUTH TWICE DAILY 60 tablet 5    rifaximin (XIFAXAN) 550 MG tablet Take 550 mg by mouth 3 times daily      metoprolol tartrate (LOPRESSOR) 25 MG tablet TAKE 2 TABLETS(50 MG) BY MOUTH TWICE DAILY 360 tablet 3    ELIQUIS 5 MG TABS tablet TAKE 1 TABLET BY MOUTH TWICE DAILY 180 tablet 3    torsemide (DEMADEX) 20 MG tablet TAKE 1 TABLET BY MOUTH DAILY AS NEEDED FOR WATER RETENTION (Patient taking differently: Take 20 mg by mouth daily ) 90 tablet 1    leflunomide (ARAVA) 20 MG tablet Take 20 mg by mouth daily      fluticasone (FLONASE) 50 MCG/ACT nasal spray 2 sprays by Nasal route daily 1 Bottle 1    butalbital-acetaminophen-caffeine (FIORICET, ESGIC) -40 MG per tablet Take 1 tablet by mouth 2 times daily as needed for Headaches 20 tablet 0    albuterol sulfate  (90 Base) MCG/ACT inhaler INHALE 2 PUFFS EVERY 4 HOURS WHILE AWAKE FOR 7-10 DAYS, THEN EVERY 6 HOURS AS NEEDED FOR WHEEZING 6.7 g 1    fluticasone-vilanterol (BREO ELLIPTA) 100-25 MCG/INH AEPB inhaler Inhale 1 puff into the lungs daily 1 each 5    ketotifen (ZADITOR) 0.025 % ophthalmic solution INSTILL ONE DROP IN THE MORNING AND ONE DROP AT NIGHT INTO BOTH EYES  6     No current facility-administered medications on file prior to visit. Allergies   Allergen Reactions    Amlodipine Other (See Comments)     edema    Bystolic [Nebivolol Hcl] Other (See Comments)     Hair falling out    Demerol Hives    Dilaudid [Hydromorphone Hcl] Hives    Losartan Other (See Comments)     Possible arthralgias    Penicillins     Spironolactone Other (See Comments) and Hives     ?  Sulfa Antibiotics     Iodides Nausea And Vomiting     Radioactive dye, sever h/a high b/p       Past Medical History:   Diagnosis Date    Alcohol abuse, in remission     Anxiety state, unspecified     Depressive disorder, not elsewhere classified     Diffuse cystic mastopathy     Extrinsic asthma, unspecified     Hypertension     Leiomyoma of uterus, unspecified     Other and unspecified ovarian cyst        Patient Active Problem List   Diagnosis    Asthma    Diffuse cystic mastopathy    Gestational diabetes    Essential hypertension    Chronic insomnia    Alcohol abuse    Adjustment disorder with anxiety    Menopausal symptoms    GERD (gastroesophageal reflux disease)    Overweight (BMI 25.0-29. 9)    Obstructive sleep apnea syndrome    Paroxysmal atrial fibrillation (HCC)    Environmental allergies    Other fatigue    Steatohepatitis    Attention deficit disorder (ADD)    Cognitive impairment    Dry skin dermatitis    Gastroesophageal reflux disease without esophagitis    Primary insomnia       Past Surgical History:   Procedure Laterality Date    CHOLECYSTECTOMY      HYSTERECTOMY         Social History     Socioeconomic History    Marital status:      Spouse name: Not on file    Number of children: Not on file    Years of education: Not on file    Highest education level: Not on file   Occupational History    Not on file   Tobacco Use    Smoking status: Never Smoker    Smokeless tobacco: Never Used   Substance and Sexual Activity    Alcohol use: Yes     Alcohol/week: 1.0 - 2.0 standard drink     Types: 1 - 2 Glasses of wine per week     Comment: 2 drinks qd    Drug use: No    Sexual activity: Yes     Partners: Male   Other Topics Concern    Not on file   Social History Narrative    Not on file     Social Determinants of Health     Financial Resource Strain: Low Risk     Difficulty of Paying Living Expenses: Not hard at all   Food Insecurity: No Food Insecurity    Worried About Running Out of Food in the Last Year: Never true    [address] in the Last Year: Never true   Transportation Needs:     Lack of Transportation (Medical): Not on file    Lack of Transportation (Non-Medical):  Not on file   Physical Activity:     Days of Exercise per Week: Not on file    Minutes of Exercise per Session: Not on file   Stress:     Feeling of Stress : Not on file   Social Connections:     Frequency of Communication with Friends and Family: Not on file    Frequency of Social Gatherings with Friends and Family: Not on file    Attends Confucianist Services: Not on file    Active Member of Clubs or Organizations: Not on file    Attends Club or Organization Meetings: Not on file    Marital Status: Not on file   Intimate Partner Violence:     Fear of Current or Ex-Partner: Not on file    Emotionally Abused: Not on file    Physically Abused: Not on file    Sexually Abused: Not on file   Housing Stability:     Unable to Pay for Housing in the Last Year: Not on file    Number of [place] in the Last Year: Not on file    Unstable Housing in the Last Year: Not on file        History reviewed. No pertinent family history. PE  Vitals:    12/20/21 1259   BP: 128/88   Site: Left Upper Arm   Pulse: 77   Temp: 97.2 °F (36.2 °C)   TempSrc: Infrared   SpO2: 96%   Weight: 180 lb 12.8 oz (82 kg)   Height: 5' 9\" (1.753 m)     Estimated body mass index is 26.7 kg/m² as calculated from the following:    Height as of this encounter: 5' 9\" (1.753 m). Weight as of this encounter: 180 lb 12.8 oz (82 kg). Physical Exam  Vitals reviewed. Constitutional:       General: She is not in acute distress. Appearance: Normal appearance. HENT:      Head: Normocephalic and atraumatic. Mouth/Throat:      Pharynx: Oropharynx is clear. Eyes:      Conjunctiva/sclera: Conjunctivae normal.      Pupils: Pupils are equal, round, and reactive to light. Cardiovascular:      Rate and Rhythm: Normal rate and regular rhythm. Pulses: Normal pulses. Heart sounds: Normal heart sounds. Pulmonary:      Effort: Pulmonary effort is normal. No respiratory distress. Breath sounds: Normal breath sounds. No wheezing or rales. Abdominal:      Palpations: Abdomen is soft. Tenderness: There is no abdominal tenderness. There is no rebound. Musculoskeletal:         General: No signs of injury. Normal range of motion. Cervical back: Normal range of motion and neck supple. Skin:     General: Skin is warm and dry. Coloration: Skin is not jaundiced. Neurological:      General: No focal deficit present. Mental Status: She is alert and oriented to person, place, and time. Psychiatric:         Behavior: Behavior normal.         Thought Content:  Thought content normal.         Judgment: Judgment normal.         Immunization History   Administered Date(s) Administered    COVID-19, Starr Estimable, Primary or Immunocompromised, PF, 100mcg/0.5mL 03/19/2021, 04/24/2021    Influenza Vaccine, unspecified formulation 09/12/2018    Tdap (Boostrix, Adacel) 09/12/2018       Health Maintenance   Topic Date Due    Pneumococcal 0-64 years Vaccine (1 of 2 - PPSV23) Never done    COVID-19 Vaccine (3 - Booster for Moderna series) 10/24/2021    Shingles Vaccine (1 of 2) 04/29/2022 (Originally 9/13/2014)    HIV screen  04/29/2022 (Originally 9/13/1980)    Potassium monitoring  11/19/2022    Creatinine monitoring  11/19/2022    Lipid screen  07/05/2023    Diabetes screen  07/14/2023    Breast cancer screen  10/06/2023    DTaP/Tdap/Td vaccine (2 - Td or Tdap) 09/12/2028    Colon cancer screen colonoscopy  11/10/2030    Flu vaccine  Completed    Hepatitis C screen  Completed    Hepatitis A vaccine  Aged Out    Hepatitis B vaccine  Aged Out    Hib vaccine  Aged Out    Meningococcal (ACWY) vaccine  Aged Out       PSH, PMH, SH and FH reviewed and noted. Recent and past labs, tests and consults also reviewed. Recent or new meds also reviewed. ASSESSMENT/ PLAN:  -RA:  Diagnosed thirty years ago. Currently on Arava and prednisone. Sees Dr. Zach Hogan once monthly. -Afib: Well controlled on lopressor/flecanide. Eliquis for CVA prophylaxis. No orders of the defined types were placed in this encounter. No orders of the defined types were placed in this encounter. Medications Discontinued During This Encounter   Medication Reason    PREMARIN 0.45 MG tablet LIST CLEANUP    dicyclomine (BENTYL) 10 MG capsule LIST CLEANUP        No follow-ups on file. Shakira Schwarz MD    This dictation was generated by voice recognition computer software. Although all attempts are made to edit the dictation for accuracy, there may be errors in the transcription that are not intended.

## 2021-12-22 ENCOUNTER — TELEPHONE (OUTPATIENT)
Dept: INTERNAL MEDICINE CLINIC | Age: 56
End: 2021-12-22

## 2021-12-22 DIAGNOSIS — L03.019 INFECTION OF NAIL BED OF FINGER, UNSPECIFIED LATERALITY: Primary | ICD-10-CM

## 2021-12-22 DIAGNOSIS — L90.8 SKIN AGING: ICD-10-CM

## 2021-12-30 ENCOUNTER — TELEPHONE (OUTPATIENT)
Dept: INTERNAL MEDICINE CLINIC | Age: 56
End: 2021-12-30

## 2021-12-30 NOTE — TELEPHONE ENCOUNTER
Patient's  called in stating that the patient has been exposed to covid and she is really sick. Patient has not been tested for covid. Patient is going to go to ER. Pls advise.

## 2022-01-10 DIAGNOSIS — R60.9 WATER RETENTION: ICD-10-CM

## 2022-01-10 RX ORDER — TRAZODONE HYDROCHLORIDE 100 MG/1
TABLET ORAL
Qty: 60 TABLET | Refills: 1 | Status: SHIPPED | OUTPATIENT
Start: 2022-01-10 | End: 2022-06-27 | Stop reason: ALTCHOICE

## 2022-01-10 RX ORDER — OMEPRAZOLE 40 MG/1
CAPSULE, DELAYED RELEASE ORAL
Qty: 30 CAPSULE | Refills: 1 | Status: SHIPPED | OUTPATIENT
Start: 2022-01-10 | End: 2022-09-21

## 2022-01-10 RX ORDER — TORSEMIDE 20 MG/1
20 TABLET ORAL DAILY
Qty: 30 TABLET | Refills: 5 | Status: SHIPPED | OUTPATIENT
Start: 2022-01-10 | End: 2022-09-21

## 2022-01-18 ENCOUNTER — TELEPHONE (OUTPATIENT)
Dept: INTERNAL MEDICINE CLINIC | Age: 57
End: 2022-01-18

## 2022-01-18 DIAGNOSIS — F51.04 CHRONIC INSOMNIA: ICD-10-CM

## 2022-01-18 RX ORDER — ZOLPIDEM TARTRATE 10 MG/1
TABLET ORAL
Qty: 30 TABLET | Refills: 5 | Status: SHIPPED | OUTPATIENT
Start: 2022-01-18 | End: 2022-07-22 | Stop reason: SDUPTHER

## 2022-01-18 NOTE — TELEPHONE ENCOUNTER
Patient called in requesting refill for the following medication:        zolpidem (AMBIEN) 10 MG tablet        [facility] [address] [place] [address], [facility] - F [phone]              Pls advise.

## 2022-01-31 ENCOUNTER — OFFICE VISIT (OUTPATIENT)
Dept: CARDIOLOGY CLINIC | Age: 57
End: 2022-01-31
Payer: OTHER GOVERNMENT

## 2022-01-31 VITALS
BODY MASS INDEX: 25.25 KG/M2 | HEART RATE: 78 BPM | SYSTOLIC BLOOD PRESSURE: 110 MMHG | DIASTOLIC BLOOD PRESSURE: 80 MMHG | WEIGHT: 171 LBS | OXYGEN SATURATION: 97 %

## 2022-01-31 DIAGNOSIS — R94.31 ABNORMAL EKG: ICD-10-CM

## 2022-01-31 DIAGNOSIS — I48.0 PAROXYSMAL ATRIAL FIBRILLATION (HCC): Primary | ICD-10-CM

## 2022-01-31 DIAGNOSIS — I10 ESSENTIAL HYPERTENSION: ICD-10-CM

## 2022-01-31 PROCEDURE — 1036F TOBACCO NON-USER: CPT | Performed by: INTERNAL MEDICINE

## 2022-01-31 PROCEDURE — 3017F COLORECTAL CA SCREEN DOC REV: CPT | Performed by: INTERNAL MEDICINE

## 2022-01-31 PROCEDURE — G8484 FLU IMMUNIZE NO ADMIN: HCPCS | Performed by: INTERNAL MEDICINE

## 2022-01-31 PROCEDURE — G8427 DOCREV CUR MEDS BY ELIG CLIN: HCPCS | Performed by: INTERNAL MEDICINE

## 2022-01-31 PROCEDURE — G8417 CALC BMI ABV UP PARAM F/U: HCPCS | Performed by: INTERNAL MEDICINE

## 2022-01-31 PROCEDURE — 99214 OFFICE O/P EST MOD 30 MIN: CPT | Performed by: INTERNAL MEDICINE

## 2022-01-31 ASSESSMENT — ENCOUNTER SYMPTOMS
SHORTNESS OF BREATH: 0
COUGH: 0
CHEST TIGHTNESS: 0
CHOKING: 0

## 2022-01-31 NOTE — PROGRESS NOTES
Subjective:      Patient ID: Ninoska Jesus is a 61 y.o. female. Fatigue  Pertinent negatives include no chest pain, coughing or fatigue. Here for follow up afib/htn/abn ekg. Was in hospital 21 days wit COVID. Lost wt. Out about l wk ago. No exertional.  No syncope. No pnd or orthopnea. No fevers. No cough. Breathing stable. Rhythm stable. bp reasonable. Past Medical History:   Diagnosis Date    Alcohol abuse, in remission     Anxiety state, unspecified     Depressive disorder, not elsewhere classified     Diffuse cystic mastopathy     Extrinsic asthma, unspecified     Hypertension     Leiomyoma of uterus, unspecified     Other and unspecified ovarian cyst      Past Surgical History:   Procedure Laterality Date    CHOLECYSTECTOMY      HYSTERECTOMY       Social History     Socioeconomic History    Marital status:      Spouse name: Not on file    Number of children: Not on file    Years of education: Not on file    Highest education level: Not on file   Occupational History    Not on file   Tobacco Use    Smoking status: Never Smoker    Smokeless tobacco: Never Used   Substance and Sexual Activity    Alcohol use: Yes     Alcohol/week: 1.0 - 2.0 standard drink     Types: 1 - 2 Glasses of wine per week     Comment: 2 drinks qd    Drug use: No    Sexual activity: Yes     Partners: Male   Other Topics Concern    Not on file   Social History Narrative    Not on file     Social Determinants of Health     Financial Resource Strain: Low Risk     Difficulty of Paying Living Expenses: Not hard at all   Food Insecurity: No Food Insecurity    Worried About Running Out of Food in the Last Year: Never true    [address] in the Last Year: Never true   Transportation Needs:     Lack of Transportation (Medical): Not on file    Lack of Transportation (Non-Medical):  Not on file   Physical Activity:     Days of Exercise per Week: Not on file    Minutes of Exercise per Session: Not on file   Stress:     Feeling of Stress : Not on file   Social Connections:     Frequency of Communication with Friends and Family: Not on file    Frequency of Social Gatherings with Friends and Family: Not on file    Attends Jainism Services: Not on file    Active Member of Clubs or Organizations: Not on file    Attends Club or Organization Meetings: Not on file    Marital Status: Not on file   Intimate Partner Violence:     Fear of Current or Ex-Partner: Not on file    Emotionally Abused: Not on file    Physically Abused: Not on file    Sexually Abused: Not on file   Housing Stability:     Unable to Pay for Housing in the Last Year: Not on file    Number of [place] in the Last Year: Not on file    Unstable Housing in the Last Year: Not on file       FH reviewed, denies FH cardiac issues. Vitals:    01/31/22 1350   BP: 110/80   Pulse: 78   SpO2: 97%         Wt 214    Review of Systems   Constitutional: Negative for activity change, appetite change and fatigue. Respiratory: Negative for cough, choking, chest tightness and shortness of breath. Cardiovascular: Negative for chest pain, palpitations and leg swelling. Denies PND or orthopnea. No tachycardia or syncope. Neurological: Negative for dizziness, syncope and light-headedness. Psychiatric/Behavioral: Negative for agitation, behavioral problems and confusion. All other systems reviewed negative as done. Objective:   Physical Exam  Constitutional:       General: She is not in acute distress. Appearance: She is well-developed. HENT:      Head: Normocephalic and atraumatic. Eyes:      General:         Right eye: No discharge. Left eye: No discharge. Conjunctiva/sclera: Conjunctivae normal.   Neck:      Vascular: No JVD. Cardiovascular:      Rate and Rhythm: Normal rate and regular rhythm. Pulses:           Radial pulses are 2+ on the right side and 2+ on the left side. Heart sounds: Normal heart sounds, S1 normal and S2 normal. No murmur heard. No gallop. Pulmonary:      Effort: Pulmonary effort is normal. No respiratory distress. Breath sounds: Normal breath sounds. No wheezing or rales. Abdominal:      General: Bowel sounds are normal.      Palpations: Abdomen is soft. Tenderness: There is no abdominal tenderness. Musculoskeletal:         General: No tenderness. Normal range of motion. Cervical back: Normal range of motion. Skin:     General: Skin is warm and dry. Neurological:      Mental Status: She is alert and oriented to person, place, and time. Psychiatric:         Behavior: Behavior normal.         Thought Content: Thought content normal.         Assessment:       Diagnosis Orders   1. Paroxysmal atrial fibrillation (HCC)     2. Essential hypertension     3. Abnormal EKG             Plan:      CV stable. Now out of hosp after 21 days for COVID. Continue lopressor 50 mg bid for HTN/afib. Extra half metoprolol for less controlled times. Will continue Eliquis 5 mg bid for CVA proph/afib. Will continue lopressor 50 mg bid/flecanide 50 mg bid for afib. Reviewed outside records 10/17 at [facility]. (Head CT negative. CTPA negative for PE. Echo showing normal LV function mild TR with RVSP 36. Myoview  negative for ischemia by scan.) GI/ Liver issues. Follow up 3 months.

## 2022-02-09 DIAGNOSIS — F41.9 ANXIETY: ICD-10-CM

## 2022-02-09 RX ORDER — ALPRAZOLAM 2 MG/1
TABLET ORAL
Qty: 90 TABLET | Refills: 0 | Status: SHIPPED | OUTPATIENT
Start: 2022-02-09 | End: 2022-04-04

## 2022-03-21 ENCOUNTER — OFFICE VISIT (OUTPATIENT)
Dept: INTERNAL MEDICINE CLINIC | Age: 57
End: 2022-03-21
Payer: OTHER GOVERNMENT

## 2022-03-21 VITALS
DIASTOLIC BLOOD PRESSURE: 84 MMHG | SYSTOLIC BLOOD PRESSURE: 122 MMHG | HEART RATE: 69 BPM | WEIGHT: 168 LBS | TEMPERATURE: 98.1 F | BODY MASS INDEX: 24.81 KG/M2 | OXYGEN SATURATION: 99 %

## 2022-03-21 DIAGNOSIS — I10 ESSENTIAL HYPERTENSION: ICD-10-CM

## 2022-03-21 DIAGNOSIS — L62 NAIL DISORDERS IN DISEASES CLASSIFIED ELSEWHERE: ICD-10-CM

## 2022-03-21 DIAGNOSIS — I48.0 PAROXYSMAL ATRIAL FIBRILLATION (HCC): ICD-10-CM

## 2022-03-21 DIAGNOSIS — L62 NAIL DISORDERS IN DISEASES CLASSIFIED ELSEWHERE: Primary | ICD-10-CM

## 2022-03-21 DIAGNOSIS — R41.3 MEMORY CHANGE: ICD-10-CM

## 2022-03-21 DIAGNOSIS — J45.20 INTERMITTENT ASTHMA WITHOUT COMPLICATION, UNSPECIFIED ASTHMA SEVERITY: ICD-10-CM

## 2022-03-21 PROCEDURE — G8420 CALC BMI NORM PARAMETERS: HCPCS | Performed by: HOSPITALIST

## 2022-03-21 PROCEDURE — 1036F TOBACCO NON-USER: CPT | Performed by: HOSPITALIST

## 2022-03-21 PROCEDURE — 3017F COLORECTAL CA SCREEN DOC REV: CPT | Performed by: HOSPITALIST

## 2022-03-21 PROCEDURE — G8484 FLU IMMUNIZE NO ADMIN: HCPCS | Performed by: HOSPITALIST

## 2022-03-21 PROCEDURE — G8427 DOCREV CUR MEDS BY ELIG CLIN: HCPCS | Performed by: HOSPITALIST

## 2022-03-21 PROCEDURE — 99214 OFFICE O/P EST MOD 30 MIN: CPT | Performed by: HOSPITALIST

## 2022-03-21 ASSESSMENT — PATIENT HEALTH QUESTIONNAIRE - PHQ9
SUM OF ALL RESPONSES TO PHQ QUESTIONS 1-9: 2
SUM OF ALL RESPONSES TO PHQ9 QUESTIONS 1 & 2: 2
SUM OF ALL RESPONSES TO PHQ QUESTIONS 1-9: 2
1. LITTLE INTEREST OR PLEASURE IN DOING THINGS: 1
SUM OF ALL RESPONSES TO PHQ QUESTIONS 1-9: 2
SUM OF ALL RESPONSES TO PHQ QUESTIONS 1-9: 2
2. FEELING DOWN, DEPRESSED OR HOPELESS: 1

## 2022-03-21 NOTE — PROGRESS NOTES
[facility]  Follow-up visit   3/21/2022    Celso Anabelle (:  1965) is a 61 y.o. female, here for evaluation of the following medical concerns:    Chief Complaint   Patient presents with    Medication Refill    Nail Problem    Arthritis     Rheumatoid        HPI  -RA:  Diagnosed thirty years ago. Currently on Arava and prednisone. Sees Dr. Trey Jara once monthly.       -Afib: Sees Dr. Griselda Flaherty. Well controlled on lopressor/flecanide. Eliquis for CVA prophylaxis.       -Hx of MDD/PTSD:  Father  in . Nasty divorce . Then there was a stalker. Father committed suicide. Son is retired  and has PTSD. She has night terrors. She is on trazadone 100, ambien 10, prn Xanex.        -Recent 21 day hospitalization for COVID-19.      -Ongoing problems with very brittle nails and perhaps fumgal infection refractory to courses of Nizoril.      -Hasn't worked since . 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 ROS  Constitutional: Positive for fatigue. Negative for activity change, appetite change, chills, diaphoresis, fever and unexpected weight change. HENT: Negative for sinus pressure, sinus pain, sore throat, trouble swallowing and voice change. Eyes: Negative for visual disturbance. Respiratory: Negative for cough, chest tightness, shortness of breath and wheezing. Cardiovascular: Negative for chest pain, palpitations and leg swelling. Gastrointestinal: Negative for abdominal distention, abdominal pain, blood in stool, constipation, diarrhea, nausea and vomiting. Endocrine: Negative for polydipsia and polyphagia. Genitourinary: Negative for decreased urine volume, difficulty urinating, dysuria and urgency. Musculoskeletal: Positive for arthralgias. Negative for back pain, gait problem, joint swelling and myalgias. Neurological: Negative for dizziness, seizures, syncope, light-headedness and headaches.    Psychiatric/Behavioral: Positive for dysphoric mood. Negative for agitation, behavioral problems, confusion and suicidal ideas. The patient is nervous/anxious. HISTORIES  Current Outpatient Medications on File Prior to Visit   Medication Sig Dispense Refill    ALPRAZolam (XANAX) 2 MG tablet TAKE 1/2 TO 1 TABLET BY MOUTH THREE TIMES DAILY AS NEEDED FOR ANXIETY 90 tablet 0    zolpidem (AMBIEN) 10 MG tablet TAKE 1 TABLET BY MOUTH EVERY NIGHT AS NEEDED FOR SLEEP 30 tablet 5    torsemide (DEMADEX) 20 MG tablet Take 1 tablet by mouth daily 30 tablet 5    traZODone (DESYREL) 100 MG tablet TAKE 2 TABLETS BY MOUTH AT BEDTIME 60 tablet 1    flecainide (TAMBOCOR) 50 MG tablet Take 1 tablet by mouth 2 times daily 180 tablet 3    predniSONE (DELTASONE) 1 MG tablet Take 4 mg by mouth daily      potassium chloride (KLOR-CON M) 20 MEQ extended release tablet TAKE 1 TABLET BY MOUTH TWICE DAILY 60 tablet 5    metoprolol tartrate (LOPRESSOR) 25 MG tablet TAKE 2 TABLETS(50 MG) BY MOUTH TWICE DAILY 360 tablet 3    ELIQUIS 5 MG TABS tablet TAKE 1 TABLET BY MOUTH TWICE DAILY 180 tablet 3    leflunomide (ARAVA) 20 MG tablet Take 20 mg by mouth daily      butalbital-acetaminophen-caffeine (FIORICET, ESGIC) -40 MG per tablet Take 1 tablet by mouth 2 times daily as needed for Headaches 20 tablet 0    albuterol sulfate  (90 Base) MCG/ACT inhaler INHALE 2 PUFFS EVERY 4 HOURS WHILE AWAKE FOR 7-10 DAYS, THEN EVERY 6 HOURS AS NEEDED FOR WHEEZING 6.7 g 1    fluticasone-vilanterol (BREO ELLIPTA) 100-25 MCG/INH AEPB inhaler Inhale 1 puff into the lungs daily 1 each 5    ketotifen (ZADITOR) 0.025 % ophthalmic solution INSTILL ONE DROP IN THE MORNING AND ONE DROP AT NIGHT INTO BOTH EYES  6    omeprazole (PRILOSEC) 40 MG delayed release capsule TAKE 1 CAPSULE BY MOUTH EVERY DAY 30 capsule 1    fluticasone (FLONASE) 50 MCG/ACT nasal spray 2 sprays by Nasal route daily 1 Bottle 1     No current facility-administered medications on file prior to visit.       Allergies Allergen Reactions    Amlodipine Other (See Comments)     edema    Bystolic [Nebivolol Hcl] Other (See Comments)     Hair falling out    Demerol Hives    Dilaudid [Hydromorphone Hcl] Hives    Losartan Other (See Comments)     Possible arthralgias    Penicillins     Spironolactone Other (See Comments) and Hives     ?  Sulfa Antibiotics     Iodides Nausea And Vomiting     Radioactive dye, sever h/a high b/p     Past Medical History:   Diagnosis Date    Alcohol abuse, in remission     Anxiety state, unspecified     COVID     Depressive disorder, not elsewhere classified     Diffuse cystic mastopathy     Extrinsic asthma, unspecified     Hypertension     Leiomyoma of uterus, unspecified     Other and unspecified ovarian cyst      Patient Active Problem List   Diagnosis    Asthma    Diffuse cystic mastopathy    Gestational diabetes    Essential hypertension    Chronic insomnia    Alcohol abuse    Adjustment disorder with anxiety    Menopausal symptoms    GERD (gastroesophageal reflux disease)    Overweight (BMI 25.0-29. 9)    Obstructive sleep apnea syndrome    Paroxysmal atrial fibrillation (HCC)    Environmental allergies    Other fatigue    Steatohepatitis    Attention deficit disorder (ADD)    Cognitive impairment    Dry skin dermatitis    Gastroesophageal reflux disease without esophagitis    Primary insomnia     Past Surgical History:   Procedure Laterality Date    CHOLECYSTECTOMY      HYSTERECTOMY       Social History     Socioeconomic History    Marital status:      Spouse name: Not on file    Number of children: Not on file    Years of education: Not on file    Highest education level: Not on file   Occupational History    Not on file   Tobacco Use    Smoking status: Never Smoker    Smokeless tobacco: Never Used   Substance and Sexual Activity    Alcohol use:  Yes     Alcohol/week: 1.0 - 2.0 standard drink     Types: 1 - 2 Glasses of wine per week Comment: 2 drinks qd    Drug use: No    Sexual activity: Yes     Partners: Male   Other Topics Concern    Not on file   Social History Narrative    Not on file     Social Determinants of Health     Financial Resource Strain: Low Risk     Difficulty of Paying Living Expenses: Not hard at all   Food Insecurity: No Food Insecurity    Worried About Running Out of Food in the Last Year: Never true    [address] in the Last Year: Never true   Transportation Needs:     Lack of Transportation (Medical): Not on file    Lack of Transportation (Non-Medical): Not on file   Physical Activity:     Days of Exercise per Week: Not on file    Minutes of Exercise per Session: Not on file   Stress:     Feeling of Stress : Not on file   Social Connections:     Frequency of Communication with Friends and Family: Not on file    Frequency of Social Gatherings with Friends and Family: Not on file    Attends Alevism Services: Not on file    Active Member of [address] [place] [address] or Organizations: Not on file    Attends Club or Organization Meetings: Not on file    Marital Status: Not on file   Intimate Partner Violence:     Fear of Current or Ex-Partner: Not on file    Emotionally Abused: Not on file    Physically Abused: Not on file    Sexually Abused: Not on file   Housing Stability:     Unable to Pay for Housing in the Last Year: Not on file    Number of [place] in the Last Year: Not on file    Unstable Housing in the Last Year: Not on file      No family history on file. PE  Vitals:    03/21/22 1320   BP: 122/84   Pulse: 69   Temp: 98.1 °F (36.7 °C)   SpO2: 99%   Weight: 168 lb (76.2 kg)     Estimated body mass index is 24.81 kg/m² as calculated from the following:    Height as of 12/20/21: 5' 9\" (1.753 m). Weight as of this encounter: 168 lb (76.2 kg). Physical Exam  Vitals reviewed. Constitutional:       General: She is not in acute distress. Appearance: Normal appearance.    HENT:      Head: Normocephalic and atraumatic. Mouth/Throat:      Pharynx: Oropharynx is clear. Eyes:      Conjunctiva/sclera: Conjunctivae normal.      Pupils: Pupils are equal, round, and reactive to light. Cardiovascular:      Rate and Rhythm: Normal rate and regular rhythm. Pulses: Normal pulses. Heart sounds: Normal heart sounds. Pulmonary:      Effort: Pulmonary effort is normal. No respiratory distress. Breath sounds: Normal breath sounds. No wheezing or rales. Abdominal:      Palpations: Abdomen is soft. Tenderness: There is no abdominal tenderness. There is no rebound. Musculoskeletal:         General: No signs of injury. Normal range of motion. Cervical back: Normal range of motion and neck supple. Skin:     General: Skin is warm and dry. Coloration: Skin is not jaundiced. Neurological:      General: No focal deficit present. Mental Status: She is alert and oriented to person, place, and time. Psychiatric:         Behavior: Behavior normal.         Thought Content: Thought content normal.         Judgment: Judgment normal.         ASSESSMENT/ PLAN:  1. Nail disorders in diseases classified elsewhere  - Tania Rider MD, Dermatology, [facility]    2. Paroxysmal atrial fibrillation (HCC)  -Well controlled on lopressor/flecanide. Eliquis for CVA prophylaxis. Follows with Dr. Robe Magaña. 3. Essential hypertension  -Well controlled. Continue  lopresser. 4. Intermittent asthma without complication, unspecified asthma severity  -Well controlled with albuterol. 5.  RA:  Diagnosed thirty years ago. Currently on Arava and prednisone. Sees Dr. Malachy Boast once monthly. 6.  Hx of MDD/PTSD:  Crowe Dimes. She is on trazadone 100, ambien 10, prn Xanex.         7.  LATONIA:  On CPAP         Orders Placed This Encounter   Procedures    Comprehensive Metabolic Panel    TSH with Reflex    Maya Prabhakar MD, Dermatology, [facility] No orders of the defined types were placed in this encounter. Medications Discontinued During This Encounter   Medication Reason    rifaximin (XIFAXAN) 550 MG tablet LIST CLEANUP        Return in about 6 months (around 9/21/2022). Stefany Champion MD    This dictation was generated by voice recognition computer software. Although all attempts are made to edit the dictation for accuracy, there may be errors in the transcription that are not intended.

## 2022-03-22 LAB
A/G RATIO: 2 (ref 1.1–2.2)
ALBUMIN SERPL-MCNC: 5 G/DL (ref 3.4–5)
ALP BLD-CCNC: 77 U/L (ref 40–129)
ALT SERPL-CCNC: 21 U/L (ref 10–40)
ANION GAP SERPL CALCULATED.3IONS-SCNC: 18 MMOL/L (ref 3–16)
AST SERPL-CCNC: 25 U/L (ref 15–37)
BILIRUB SERPL-MCNC: 0.6 MG/DL (ref 0–1)
BUN BLDV-MCNC: 9 MG/DL (ref 7–20)
CALCIUM SERPL-MCNC: 9.6 MG/DL (ref 8.3–10.6)
CHLORIDE BLD-SCNC: 102 MMOL/L (ref 99–110)
CO2: 21 MMOL/L (ref 21–32)
CREAT SERPL-MCNC: 0.7 MG/DL (ref 0.6–1.1)
GFR AFRICAN AMERICAN: >60
GFR NON-AFRICAN AMERICAN: >60
GLUCOSE BLD-MCNC: 112 MG/DL (ref 70–99)
HCT VFR BLD CALC: 42.9 % (ref 36–48)
HEMOGLOBIN: 14.6 G/DL (ref 12–16)
MCH RBC QN AUTO: 33.5 PG (ref 26–34)
MCHC RBC AUTO-ENTMCNC: 34.1 G/DL (ref 31–36)
MCV RBC AUTO: 98.4 FL (ref 80–100)
PDW BLD-RTO: 13.8 % (ref 12.4–15.4)
PLATELET # BLD: 237 K/UL (ref 135–450)
PMV BLD AUTO: 8.3 FL (ref 5–10.5)
POTASSIUM SERPL-SCNC: 4.4 MMOL/L (ref 3.5–5.1)
RBC # BLD: 4.36 M/UL (ref 4–5.2)
SODIUM BLD-SCNC: 141 MMOL/L (ref 136–145)
TOTAL PROTEIN: 7.5 G/DL (ref 6.4–8.2)
TSH REFLEX: 1.04 UIU/ML (ref 0.27–4.2)
WBC # BLD: 6.5 K/UL (ref 4–11)

## 2022-04-04 DIAGNOSIS — F41.9 ANXIETY: ICD-10-CM

## 2022-04-04 RX ORDER — ALPRAZOLAM 2 MG/1
TABLET ORAL
Qty: 90 TABLET | Refills: 0 | Status: SHIPPED | OUTPATIENT
Start: 2022-04-04 | End: 2022-05-09

## 2022-04-11 RX ORDER — POTASSIUM CHLORIDE 20 MEQ/1
TABLET, EXTENDED RELEASE ORAL
Qty: 60 TABLET | Refills: 5 | Status: SHIPPED | OUTPATIENT
Start: 2022-04-11 | End: 2022-09-19

## 2022-05-02 ENCOUNTER — OFFICE VISIT (OUTPATIENT)
Dept: CARDIOLOGY CLINIC | Age: 57
End: 2022-05-02
Payer: OTHER GOVERNMENT

## 2022-05-02 VITALS
WEIGHT: 163 LBS | SYSTOLIC BLOOD PRESSURE: 124 MMHG | DIASTOLIC BLOOD PRESSURE: 80 MMHG | BODY MASS INDEX: 24.07 KG/M2 | HEART RATE: 72 BPM

## 2022-05-02 DIAGNOSIS — I10 ESSENTIAL HYPERTENSION: ICD-10-CM

## 2022-05-02 DIAGNOSIS — I48.0 PAROXYSMAL ATRIAL FIBRILLATION (HCC): Primary | ICD-10-CM

## 2022-05-02 DIAGNOSIS — R94.31 ABNORMAL EKG: ICD-10-CM

## 2022-05-02 PROCEDURE — 1036F TOBACCO NON-USER: CPT | Performed by: INTERNAL MEDICINE

## 2022-05-02 PROCEDURE — 99214 OFFICE O/P EST MOD 30 MIN: CPT | Performed by: INTERNAL MEDICINE

## 2022-05-02 PROCEDURE — G8427 DOCREV CUR MEDS BY ELIG CLIN: HCPCS | Performed by: INTERNAL MEDICINE

## 2022-05-02 PROCEDURE — 3017F COLORECTAL CA SCREEN DOC REV: CPT | Performed by: INTERNAL MEDICINE

## 2022-05-02 PROCEDURE — G8420 CALC BMI NORM PARAMETERS: HCPCS | Performed by: INTERNAL MEDICINE

## 2022-05-02 RX ORDER — DICYCLOMINE HCL 20 MG
20 TABLET ORAL 4 TIMES DAILY PRN
COMMUNITY
Start: 2022-01-20

## 2022-05-02 RX ORDER — FAMOTIDINE 40 MG/1
40 TABLET, FILM COATED ORAL DAILY
COMMUNITY
End: 2022-09-13

## 2022-05-02 ASSESSMENT — ENCOUNTER SYMPTOMS
SHORTNESS OF BREATH: 0
COUGH: 0
CHEST TIGHTNESS: 0
CHOKING: 0

## 2022-05-02 NOTE — PROGRESS NOTES
Subjective:      Patient ID: Michele Santos is a 61 y.o. female. Fatigue  Pertinent negatives include no chest pain, coughing or fatigue. Here for follow up afib/htn/abn ekg. No exertional.  No syncope. No pnd or orthopnea. No fevers. No cough. Breathing stable. Rhythm stable. bp reasonable.  with prostate issues. Past Medical History:   Diagnosis Date    Alcohol abuse, in remission     Anxiety state, unspecified     COVID     Depressive disorder, not elsewhere classified     Diffuse cystic mastopathy     Extrinsic asthma, unspecified     Hypertension     Leiomyoma of uterus, unspecified     Other and unspecified ovarian cyst      Past Surgical History:   Procedure Laterality Date    CHOLECYSTECTOMY      HYSTERECTOMY       Social History     Socioeconomic History    Marital status:      Spouse name: Not on file    Number of children: Not on file    Years of education: Not on file    Highest education level: Not on file   Occupational History    Not on file   Tobacco Use    Smoking status: Never Smoker    Smokeless tobacco: Never Used   Substance and Sexual Activity    Alcohol use: Yes     Alcohol/week: 1.0 - 2.0 standard drink     Types: 1 - 2 Glasses of wine per week     Comment: 2 drinks qd    Drug use: No    Sexual activity: Yes     Partners: Male   Other Topics Concern    Not on file   Social History Narrative    Not on file     Social Determinants of Health     Financial Resource Strain: Low Risk     Difficulty of Paying Living Expenses: Not hard at all   Food Insecurity: No Food Insecurity    Worried About Running Out of Food in the Last Year: Never true    [address] in the Last Year: Never true   Transportation Needs:     Lack of Transportation (Medical): Not on file    Lack of Transportation (Non-Medical):  Not on file   Physical Activity:     Days of Exercise per Week: Not on file    Minutes of Exercise per Session: Not on file   Stress:  Feeling of Stress : Not on file   Social Connections:     Frequency of Communication with Friends and Family: Not on file    Frequency of Social Gatherings with Friends and Family: Not on file    Attends Jainism Services: Not on file    Active Member of Clubs or Organizations: Not on file    Attends Club or Organization Meetings: Not on file    Marital Status: Not on file   Intimate Partner Violence:     Fear of Current or Ex-Partner: Not on file    Emotionally Abused: Not on file    Physically Abused: Not on file    Sexually Abused: Not on file   Housing Stability:     Unable to Pay for Housing in the Last Year: Not on file    Number of [place] in the Last Year: Not on file    Unstable Housing in the Last Year: Not on file       FH reviewed, denies FH cardiac issues. Vitals:    05/02/22 1340   BP: 124/80   Pulse: 72         Wt 214    Review of Systems   Constitutional: Negative for activity change, appetite change and fatigue. Respiratory: Negative for cough, choking, chest tightness and shortness of breath. Cardiovascular: Negative for chest pain, palpitations and leg swelling. Denies PND or orthopnea. No tachycardia or syncope. Neurological: Negative for dizziness, syncope and light-headedness. Psychiatric/Behavioral: Negative for agitation, behavioral problems and confusion. All other systems reviewed negative as done. Objective:   Physical Exam  Constitutional:       General: She is not in acute distress. Appearance: She is well-developed. HENT:      Head: Normocephalic and atraumatic. Eyes:      General:         Right eye: No discharge. Left eye: No discharge. Conjunctiva/sclera: Conjunctivae normal.   Neck:      Vascular: No JVD. Cardiovascular:      Rate and Rhythm: Normal rate and regular rhythm. Pulses:           Radial pulses are 2+ on the right side and 2+ on the left side.       Heart sounds: Normal heart sounds, S1 normal and S2 normal. No murmur heard. No gallop. Pulmonary:      Effort: Pulmonary effort is normal. No respiratory distress. Breath sounds: Normal breath sounds. No wheezing or rales. Abdominal:      General: Bowel sounds are normal.      Palpations: Abdomen is soft. Tenderness: There is no abdominal tenderness. Musculoskeletal:         General: No tenderness. Normal range of motion. Cervical back: Normal range of motion. Skin:     General: Skin is warm and dry. Neurological:      Mental Status: She is alert and oriented to person, place, and time. Psychiatric:         Behavior: Behavior normal.         Thought Content: Thought content normal.         Assessment:       Diagnosis Orders   1. Paroxysmal atrial fibrillation (HCC)     2. Essential hypertension     3. Abnormal EKG           Plan:      CV stable. Still with  prostate issues. Rhythm stable. Continue lopressor 50 mg bid for HTN/afib. Extra half metoprolol for less controlled times. Will continue Eliquis 5 mg bid for CVA proph/afib. Will continue lopressor 50 mg bid/flecanide 50 mg bid for afib. Review[facility] 10/17 [facility]. (Head CT negative. CTPA negative for PE. Echo showing normal LV function mild TR with RVSP 36. Myoview  negative for ischemia by scan.) GI/ Liver issues. Follow up 3 months.

## 2022-05-06 ENCOUNTER — TELEPHONE (OUTPATIENT)
Dept: INTERNAL MEDICINE CLINIC | Age: 57
End: 2022-05-06

## 2022-05-06 DIAGNOSIS — F41.9 ANXIETY: ICD-10-CM

## 2022-05-06 NOTE — TELEPHONE ENCOUNTER
Please refill    ALPRAZolam [place]) 2 MG tablet     [facility] [address] [facility], [address] [place] [address] [place] [address]   Phone:  [phone]  Fax:  [phone]      Please advise and call

## 2022-05-09 RX ORDER — ALPRAZOLAM 2 MG/1
TABLET ORAL
Qty: 90 TABLET | Refills: 0 | OUTPATIENT
Start: 2022-05-09 | End: 2022-06-20 | Stop reason: SDUPTHER

## 2022-05-09 NOTE — TELEPHONE ENCOUNTER
OARRS completed on patient. Last fill on 4/4/22 #90 x 30 days. Script called in and Patient advised.

## 2022-05-10 RX ORDER — FLUTICASONE FUROATE AND VILANTEROL TRIFENATATE 100; 25 UG/1; UG/1
1 POWDER RESPIRATORY (INHALATION) DAILY
Qty: 1 EACH | Refills: 5 | Status: SHIPPED | OUTPATIENT
Start: 2022-05-10 | End: 2022-09-13

## 2022-05-10 NOTE — TELEPHONE ENCOUNTER
Patient called in requesting refill for the following medication:      fluticasone-vilanterol (BREO ELLIPTA) 100-25 MCG/INH AEPB inhaler    Patient stated she can not wait 48 hours for a refill or she will end up in ED. Patient has really bad asthma and allergies. [facility] 200 [facility], [address] [place] [address] & [address] [facility] -  [phone]    Pls advise.

## 2022-05-18 NOTE — TELEPHONE ENCOUNTER
Patient called in requesting a prior authorization needed for the following medication:      fluticasone-vilanterol (BREO ELLIPTA) 100-25 MCG/INH AEPB inhaler     Patient stated she can not wait 48 hours for this or she will end up in ED. Patient has really bad asthma, allergies and heart problems. The medication was already filled, her insurance just needs a prior authorization         [facility] 200 [facility], [address] [place] [address]     Pls advise.

## 2022-05-19 NOTE — TELEPHONE ENCOUNTER
Submitted PA for Breo Ellipta 100-25MCG/INH aerosol powder  Via CMM Key: [id number] STATUS: DENIED; DENIAL LETTER ATTACHED. If this requires a response please respond to the pool ( [facility] [address]). Thank you please advise patient.

## 2022-05-25 RX ORDER — ALBUTEROL SULFATE 90 UG/1
AEROSOL, METERED RESPIRATORY (INHALATION)
Qty: 6.7 G | Refills: 1 | Status: SHIPPED | OUTPATIENT
Start: 2022-05-25

## 2022-05-25 NOTE — TELEPHONE ENCOUNTER
Patient is following up on her refill request for       albuterol sulfate  (90 Base) MCG/ACT inhaler    She is completely out of this medication and she needs it desperately.     [facility] 200 [facility], [address] [place] [address] [address] [place] [address] [phone]     Please call and advise

## 2022-05-26 ENCOUNTER — OFFICE VISIT (OUTPATIENT)
Dept: ENT CLINIC | Age: 57
End: 2022-05-26
Payer: OTHER GOVERNMENT

## 2022-05-26 ENCOUNTER — TELEPHONE (OUTPATIENT)
Dept: ENT CLINIC | Age: 57
End: 2022-05-26

## 2022-05-26 VITALS — SYSTOLIC BLOOD PRESSURE: 119 MMHG | TEMPERATURE: 97.6 F | DIASTOLIC BLOOD PRESSURE: 80 MMHG | HEART RATE: 78 BPM

## 2022-05-26 DIAGNOSIS — G47.33 OSA ON CPAP: ICD-10-CM

## 2022-05-26 DIAGNOSIS — J34.2 DNS (DEVIATED NASAL SEPTUM): ICD-10-CM

## 2022-05-26 DIAGNOSIS — J30.9 ALLERGIC RHINITIS, UNSPECIFIED SEASONALITY, UNSPECIFIED TRIGGER: Primary | ICD-10-CM

## 2022-05-26 DIAGNOSIS — Z99.89 OSA ON CPAP: ICD-10-CM

## 2022-05-26 DIAGNOSIS — J34.3 HYPERTROPHY OF BOTH INFERIOR NASAL TURBINATES: ICD-10-CM

## 2022-05-26 PROCEDURE — G8427 DOCREV CUR MEDS BY ELIG CLIN: HCPCS | Performed by: STUDENT IN AN ORGANIZED HEALTH CARE EDUCATION/TRAINING PROGRAM

## 2022-05-26 PROCEDURE — 3017F COLORECTAL CA SCREEN DOC REV: CPT | Performed by: STUDENT IN AN ORGANIZED HEALTH CARE EDUCATION/TRAINING PROGRAM

## 2022-05-26 PROCEDURE — 1036F TOBACCO NON-USER: CPT | Performed by: STUDENT IN AN ORGANIZED HEALTH CARE EDUCATION/TRAINING PROGRAM

## 2022-05-26 PROCEDURE — G8420 CALC BMI NORM PARAMETERS: HCPCS | Performed by: STUDENT IN AN ORGANIZED HEALTH CARE EDUCATION/TRAINING PROGRAM

## 2022-05-26 PROCEDURE — 99214 OFFICE O/P EST MOD 30 MIN: CPT | Performed by: STUDENT IN AN ORGANIZED HEALTH CARE EDUCATION/TRAINING PROGRAM

## 2022-05-26 RX ORDER — MONTELUKAST SODIUM 10 MG/1
10 TABLET ORAL DAILY
Qty: 30 TABLET | Refills: 3 | Status: SHIPPED | OUTPATIENT
Start: 2022-05-26 | End: 2022-08-04 | Stop reason: SDUPTHER

## 2022-05-26 RX ORDER — FLUTICASONE PROPIONATE 50 MCG
1 SPRAY, SUSPENSION (ML) NASAL DAILY
Qty: 32 G | Refills: 1 | Status: SHIPPED | OUTPATIENT
Start: 2022-05-26 | End: 2022-09-13

## 2022-05-26 RX ORDER — LEVOCETIRIZINE DIHYDROCHLORIDE 5 MG/1
5 TABLET, FILM COATED ORAL NIGHTLY
Qty: 30 TABLET | Refills: 3 | Status: SHIPPED | OUTPATIENT
Start: 2022-05-26 | End: 2022-06-13

## 2022-05-26 RX ORDER — AZELASTINE 1 MG/ML
1 SPRAY, METERED NASAL 2 TIMES DAILY
Qty: 60 ML | Refills: 1 | Status: SHIPPED | OUTPATIENT
Start: 2022-05-26

## 2022-05-26 NOTE — PROGRESS NOTES
[address] VISIT      Patient Name: Johnnie Schroeder  Medical Record Number:  [id number]  Primary Care Physician:  Polly Ordoñez MD    ChiefComplaint     Chief Complaint   Patient presents with    Other     allergies. History of Present Illness     Johnnie Schroeder is an 61 y.o. female previously seen for sinusitis and associated allergy, seen last by Dr. Ned Salomon on 5/5/2021. Interval History:   Longstanding allergies. Stopped allergy immunotherapy in 2014. Was helping immensely but had to stop after getting  and losing insurance. [place] allergies worse. Astelin nasal spray, sustain allergy eye drops, and over-the-counter antihistamine - none of which provides significant relief. Unable to digest allegra due to GI issues. + asthma which has been bad recently. Will occasionally get steroid shot in the past by Dr. Ned Salomon. Prior use of Flonase but has not used recently. No neti pot use in the past.     Has not had sinus infection in a while. Was blowing green mucus from the nose a few weeks ago. Sense of smell has been decreased for years. Hospitalized with COVID in Jan for 1 month. On metoprolol. Past Medical History     Past Medical History:   Diagnosis Date    Alcohol abuse, in remission     Anxiety state, unspecified     COVID     Depressive disorder, not elsewhere classified     Diffuse cystic mastopathy     Extrinsic asthma, unspecified     Hypertension     Leiomyoma of uterus, unspecified     Other and unspecified ovarian cyst        Past Surgical History     Past Surgical History:   Procedure Laterality Date    CHOLECYSTECTOMY      HYSTERECTOMY         Family History     History reviewed. No pertinent family history.     Social History     Social History     Tobacco Use    Smoking status: Never Smoker    Smokeless tobacco: Never Used   Substance Use Topics    Alcohol use: Yes     Alcohol/week: 1.0 - 2.0 standard drink     Types: 1 - 2 Glasses of wine per week     Comment: 2 drinks qd    Drug use: No        Allergies     Allergies   Allergen Reactions    Amlodipine Other (See Comments)     edema    Bystolic [Nebivolol Hcl] Other (See Comments)     Hair falling out    Demerol Hives    Dilaudid [Hydromorphone Hcl] Hives    Losartan Other (See Comments)     Possible arthralgias    Penicillins     Spironolactone Other (See Comments) and Hives     ?     Sulfa Antibiotics     Iodides Nausea And Vomiting     Radioactive dye, sever h/a high b/p       Medications     Current Outpatient Medications   Medication Sig Dispense Refill    levocetirizine (XYZAL) 5 MG tablet Take 1 tablet by mouth nightly 30 tablet 3    fluticasone (FLONASE) 50 MCG/ACT nasal spray 1 spray by Each Nostril route daily 32 g 1    azelastine (ASTELIN) 0.1 % nasal spray 1 spray by Nasal route 2 times daily Use in each nostril as directed 60 mL 1    montelukast (SINGULAIR) 10 MG tablet Take 1 tablet by mouth daily 30 tablet 3    albuterol sulfate  (90 Base) MCG/ACT inhaler INHALE 2 PUFFS EVERY 4 HOURS WHILE AWAKE FOR 7-10 DAYS, THEN EVERY 6 HOURS AS NEEDED FOR WHEEZING 6.7 g 1    fluticasone-salmeterol (ADVAIR HFA) 115-21 MCG/ACT inhaler Inhale 2 puffs into the lungs daily 1 each 2    fluticasone-vilanterol (BREO ELLIPTA) 100-25 MCG/INH AEPB inhaler Inhale 1 puff into the lungs daily 1 each 5    ALPRAZolam (XANAX) 2 MG tablet TAKE 1/2 TO 1 TABLET BY MOUTH THREE TIMES DAILY AS NEEDED FOR ANXIETY 90 tablet 0    dicyclomine (BENTYL) 20 MG tablet Take 20 mg by mouth 4 times daily as needed      famotidine (PEPCID) 40 MG tablet Take 40 mg by mouth daily      rifAXIMin (XIFAXAN) 550 MG tablet Take 550 mg by mouth 3 times daily      potassium chloride (KLOR-CON M) 20 MEQ extended release tablet TAKE 1 TABLET BY MOUTH TWICE DAILY 60 tablet 5    zolpidem (AMBIEN) 10 MG tablet TAKE 1 TABLET BY MOUTH EVERY NIGHT AS NEEDED FOR SLEEP 30 tablet 5    torsemide (DEMADEX) 20 MG tablet Take 1 tablet by mouth daily 30 tablet 5    traZODone (DESYREL) 100 MG tablet TAKE 2 TABLETS BY MOUTH AT BEDTIME 60 tablet 1    omeprazole (PRILOSEC) 40 MG delayed release capsule TAKE 1 CAPSULE BY MOUTH EVERY DAY 30 capsule 1    flecainide (TAMBOCOR) 50 MG tablet Take 1 tablet by mouth 2 times daily 180 tablet 3    predniSONE (DELTASONE) 1 MG tablet Take 4 mg by mouth daily      metoprolol tartrate (LOPRESSOR) 25 MG tablet TAKE 2 TABLETS(50 MG) BY MOUTH TWICE DAILY 360 tablet 3    ELIQUIS 5 MG TABS tablet TAKE 1 TABLET BY MOUTH TWICE DAILY 180 tablet 3    leflunomide (ARAVA) 20 MG tablet Take 20 mg by mouth daily      butalbital-acetaminophen-caffeine (FIORICET, ESGIC) -40 MG per tablet Take 1 tablet by mouth 2 times daily as needed for Headaches 20 tablet 0    ketotifen (ZADITOR) 0.025 % ophthalmic solution INSTILL ONE DROP IN THE MORNING AND ONE DROP AT NIGHT INTO BOTH EYES  6     No current facility-administered medications for this visit. Review of Systems     REVIEW OF SYSTEM: See HPI above    PhysicalExam     Vitals:    05/26/22 1313   BP: 119/80   Site: Left Upper Arm   Position: Sitting   Cuff Size: Medium Adult   Pulse: 78   Temp: 97.6 °F (36.4 °C)   TempSrc: Temporal       PHYSICAL EXAM  /80 (Site: Left Upper Arm, Position: Sitting, Cuff Size: Medium Adult)   Pulse 78   Temp 97.6 °F (36.4 °C) (Temporal)     GENERAL: No acute distress, alert and oriented. EYES: EOMI, Anti-icteric. NOSE: On anterior rhinoscopy there is no epistaxis, nasal mucosa moist and normal appearing, no purulent drainage. Nasal septum deviated to the right. Bilateral inferior turbinates hypertrophied.   EARS: Normal external appearance; on portable otomicroscopy:     -Ad: External auditory canal without stenosis, tympanic membrane clear, no middle ear effusions or retractions -As: External auditory canal without stenosis, tympanic membrane clear, no middle ear effusions or retractions  FACE: HB 1/6 bilaterally, symmetric appearing, sensation equal bilaterally  ORAL CAVITY: No masses or lesions visualized or palpated, uvula is midline, moist mucous membranes  NECK: Normal range of motion, no thyromegaly, trachea is midline, no palpable lymphadenopathy or neck masses, no crepitus  CHEST: Normal respiratory effort, breathing comfortably, no retractions  SKIN: No rashes, normal appearing skin, no evidence of skin lesions/tumors  NEURO: Cranial Nerves 2, 3, 4, 5, 6, 7, 11, 12 grossly intact bilaterally     I have performed a head and neck physical exam personally or was physically present during the key or critical portions of the service. Assessment and Plan     1. Allergic rhinitis, unspecified seasonality, unspecified trigger  -Unfortunately she is on metoprolol and thus not a candidate for subcutaneous allergy immunotherapy injections  -Add Singulair daily  - Continue Astelin, add Flonase daily  - Start sinonasal saline irrigations daily    2. DNS (deviated nasal septum)  3. Hypertrophy of both inferior nasal turbinates  -See above. If she develops any worsening nasal congestion when she restarts her CPAP can consider septoplasty inferior turbinate reduction to restore patency of nasal airway    4. LATONIA on CPAP  -Currently getting refitted for her CPAP device. Follow-Up     Return if symptoms worsen or fail to improve. [place]. [place] [address]  Department of Otolaryngology/Head and Neck Surgery  5/26/22    Medical Decision Making: The following items were considered in medical decision making:  Independent review of images  Review / order clinical lab tests  Review / order radiology tests  Decision to obtain old records      This note was generated completely or in part utilizing Dragon dictation speech recognition software. Occasionally, words are mistranscribed and despite editing, the text may contain inaccuracies due to incorrect word recognition. If further clarification is needed please contact the office at [phone]. Attending

## 2022-05-31 DIAGNOSIS — R51.9 ACUTE NONINTRACTABLE HEADACHE, UNSPECIFIED HEADACHE TYPE: ICD-10-CM

## 2022-05-31 NOTE — TELEPHONE ENCOUNTER
Patient would like a refill for her migraines related to sinus.  Patient's last refill on this medication was 06/21/21

## 2022-06-01 ENCOUNTER — TELEPHONE (OUTPATIENT)
Dept: ENT CLINIC | Age: 57
End: 2022-06-01

## 2022-06-01 NOTE — TELEPHONE ENCOUNTER
melania OVERTON Closure 2 Information: This tab is for additional flaps and grafts, including complex repair and grafts and complex repair and flaps. You can also specify a different location for the additional defect, if the location is the same you do not need to select a new one. We will insert the automated text for the repair you select below just as we do for solitary flaps and grafts. Please note that at this time if you select a location with a different insurance zone you will need to override the ICD10 and CPT if appropriate.

## 2022-06-02 RX ORDER — BUTALBITAL, ACETAMINOPHEN AND CAFFEINE 50; 325; 40 MG/1; MG/1; MG/1
1 TABLET ORAL 2 TIMES DAILY PRN
Qty: 20 TABLET | Refills: 0 | Status: SHIPPED | OUTPATIENT
Start: 2022-06-02 | End: 2022-08-04

## 2022-06-03 ENCOUNTER — TELEPHONE (OUTPATIENT)
Dept: INTERNAL MEDICINE CLINIC | Age: 57
End: 2022-06-03

## 2022-06-03 RX ORDER — AMLODIPINE BESYLATE 5 MG/1
5 TABLET ORAL DAILY
COMMUNITY
End: 2022-06-03 | Stop reason: SDUPTHER

## 2022-06-03 RX ORDER — HYDRALAZINE HYDROCHLORIDE 25 MG/1
25 TABLET, FILM COATED ORAL 2 TIMES DAILY
Qty: 60 TABLET | Refills: 0 | Status: SHIPPED | OUTPATIENT
Start: 2022-06-03 | End: 2022-09-13

## 2022-06-03 RX ORDER — AMLODIPINE BESYLATE 5 MG/1
5 TABLET ORAL DAILY
Qty: 30 TABLET | Refills: 2 | Status: SHIPPED | OUTPATIENT
Start: 2022-06-03 | End: 2022-06-03 | Stop reason: SINTOL

## 2022-06-03 NOTE — TELEPHONE ENCOUNTER
Per  start Norvasc 5 mg daily see how she feels over there weekend and call in to report her BP readings.  Pt informed and Rx called in to local pharmacy

## 2022-06-03 NOTE — TELEPHONE ENCOUNTER
Start Hydralazine 25 mg Take one twice daily    Make appointment to see Rory Mckeon on Monday or Tuesday. Script sent to pharmacy. Patient spoke to on call physician (Jennyfer Verma).

## 2022-06-03 NOTE — TELEPHONE ENCOUNTER
Pt was seen at [facility] last night and her BP has been running 697 and 326 systolic  and diastolic 699 most recent Bp was 191/106. She is Currently taking Torsemide 20 mg daily, Flecainide 50 mg BID, and Eliquis 5 mg BID. Please advise.

## 2022-06-13 RX ORDER — LORATADINE 10 MG/1
10 TABLET ORAL DAILY
Qty: 30 TABLET | Refills: 3 | Status: SHIPPED | OUTPATIENT
Start: 2022-06-13 | End: 2022-09-13

## 2022-06-13 NOTE — TELEPHONE ENCOUNTER
Please call the patient and let her know that the Xyzal was denied by her insurance company. I have placed a prescription for Claritin which should be covered per her insurance company recommendations. If she would like to continue to try Xyzal she can obtain it over-the-counter and pay out-of-pocket.

## 2022-06-20 ENCOUNTER — TELEPHONE (OUTPATIENT)
Dept: INTERNAL MEDICINE CLINIC | Age: 57
End: 2022-06-20

## 2022-06-20 ENCOUNTER — TELEPHONE (OUTPATIENT)
Dept: CARDIOLOGY CLINIC | Age: 57
End: 2022-06-20

## 2022-06-20 DIAGNOSIS — F41.9 ANXIETY: ICD-10-CM

## 2022-06-20 RX ORDER — ALPRAZOLAM 2 MG/1
TABLET ORAL
Qty: 90 TABLET | Refills: 0 | Status: SHIPPED | OUTPATIENT
Start: 2022-06-20 | End: 2022-08-01

## 2022-06-20 NOTE — TELEPHONE ENCOUNTER
Refill    ALPRAZolam Brady Basset) 2 MG tablet     [facility] [address] [facility], [address] [facility] ([place], [place]), [address] [place] [address] [place] [address]   Phone:  [phone]  Fax:  [phone]

## 2022-06-20 NOTE — TELEPHONE ENCOUNTER
----- Message from Mona Molina sent at 6/20/2022  9:39 AM EDT -----  Subject: Appointment Request    Reason for Call: Routine Hospital Follow Up    QUESTIONS  Type of Appointment? Established Patient  Reason for appointment request? Available appointments did not meet   patient need  Additional Information for Provider? Patient needing to be seen for   hospital follow up from stay on 6/1-6/10 for various issues ( high BP,   omitting, dehydrated, diarrhea, couldn't eat/drink, lost weight, needing   blood work for concerning low potassium levels, rectal bleeding ). 6/29 is   too late and already out of recommended week window for follow up. Please   call to schedule.   ---------------------------------------------------------------------------  --------------  CALL BACK INFO  What is the best way for the office to contact you? OK to leave message on   voicemail  Preferred Call Back Phone Number? [phone]  ---------------------------------------------------------------------------  --------------  SCRIPT ANSWERS  Relationship to Patient? Self  (Patient requests to see provider urgently. )? No  (Has the patient been discharged from the hospital within 2 business days   AND does not have a Telephone Encounter  Follow Up From [address] [place] [address]   documented in [address]?)? No  Do you have any questions for your primary care provider that need to be   answered prior to your appointment? (Use RN Triage if question pertains to   anything on the red flag list)? No  (Patient needs follow up visit after hospital discharge) Book first   available appointment within 7 days OF DISCHARGE, if no appt, proceed to   book the next available time slot within 14 days OF DISCHARGE AND Send   Message to Provider. [address] Follow Up appointment cannot be booked   beyond 14 Days and should result in a Message to Provider. ? Yes   Have you been diagnosed with COVID-19 in the past 10 days?  No  (Service Expert  click yes below to proceed with [employer] As Usual   Scheduling)?  Yes

## 2022-06-20 NOTE — TELEPHONE ENCOUNTER
Patient was in [facility] and they changed her medicine. Patient made appointment with dr Thu Yung on 6-27-22 in [place].   elisabeth

## 2022-06-27 ENCOUNTER — OFFICE VISIT (OUTPATIENT)
Dept: CARDIOLOGY CLINIC | Age: 57
End: 2022-06-27
Payer: OTHER GOVERNMENT

## 2022-06-27 VITALS
SYSTOLIC BLOOD PRESSURE: 100 MMHG | BODY MASS INDEX: 24.04 KG/M2 | OXYGEN SATURATION: 97 % | HEART RATE: 88 BPM | WEIGHT: 162.8 LBS | DIASTOLIC BLOOD PRESSURE: 60 MMHG

## 2022-06-27 DIAGNOSIS — I48.0 PAROXYSMAL ATRIAL FIBRILLATION (HCC): Primary | ICD-10-CM

## 2022-06-27 DIAGNOSIS — I10 ESSENTIAL HYPERTENSION: ICD-10-CM

## 2022-06-27 PROCEDURE — 3017F COLORECTAL CA SCREEN DOC REV: CPT | Performed by: NURSE PRACTITIONER

## 2022-06-27 PROCEDURE — G8420 CALC BMI NORM PARAMETERS: HCPCS | Performed by: NURSE PRACTITIONER

## 2022-06-27 PROCEDURE — G8427 DOCREV CUR MEDS BY ELIG CLIN: HCPCS | Performed by: NURSE PRACTITIONER

## 2022-06-27 PROCEDURE — 1036F TOBACCO NON-USER: CPT | Performed by: NURSE PRACTITIONER

## 2022-06-27 PROCEDURE — 99214 OFFICE O/P EST MOD 30 MIN: CPT | Performed by: NURSE PRACTITIONER

## 2022-06-27 NOTE — PROGRESS NOTES
hospital follow up    HPI:  61 y.o. patient of Dr Ellie Hashimoto with pAF, HTN, and LATONIA who was recently hospitalized at [place] [address] for intractable n/v/d. Presented today to discuss medications because a lot were changed at discharge. Apparently she was treated for [facility] urgency with /100's, intractable n/v/d, GI and vaginal bleeding. She had endoscopy and colonoscopy and treated for Hiatal hernia, gastritis, polyps and GERD. She was taking torsemide daily for edema. She was given OK to resume Eliquis. BP now 100/60 and not taking the prescribed hydralazine . She wasn't to resume exercising. No c/o cp, sob, LH/dizziness, palpitations, syncope or falls. No edema today. Past Medical History:   Diagnosis Date    Alcohol abuse, in remission     Anxiety state, unspecified     COVID     Depressive disorder, not elsewhere classified     Diffuse cystic mastopathy     Extrinsic asthma, unspecified     Hypertension     Leiomyoma of uterus, unspecified     Other and unspecified ovarian cyst      Past Surgical History:   Procedure Laterality Date    CHOLECYSTECTOMY      HYSTERECTOMY (CERVIX STATUS UNKNOWN)       History reviewed. No pertinent family history. Social History     Tobacco Use    Smoking status: Never Smoker    Smokeless tobacco: Never Used   Substance Use Topics    Alcohol use: Yes     Alcohol/week: 1.0 - 2.0 standard drink     Types: 1 - 2 Glasses of wine per week     Comment: 2 drinks qd    Drug use: No     Allergies:Amlodipine, Bystolic [nebivolol hcl], Demerol, Dilaudid [hydromorphone hcl], Losartan, Penicillins, Spironolactone, Sulfa antibiotics, and Iodides    Review of Systems  General: No changes in weight, fatigue, or night sweats. HEENT: No blurry or decreased vision. No changes in hearing, nasal discharge or sore throat. Cardiovascular:  See HPI. Respiratory: No cough, hemoptysis, or wheezing. Gastrointestinal:  See above   Genito-Urinary: No dysuria or hematuria.   No urgency or polyuria. Musculoskeletal:  No complaints of joint pain, joint swelling or muscular weakness/soreness. Neurological:  No dizziness, headaches, numbness/tingling, speech problems or weakness. Psychological:  +PTSD   Hematological and Lymphatic: No abnormal bleeding or bruising, blood clots, jaundice or swollen lymph nodes. Endocrine:   No malaise/lethargy, palpitations, polydipsia/polyuria, temperature intolerance or unexpected weight changes  Skin:  No rashes or non-healing ulcers. Physical Exam:  /60   Pulse 88   Wt 162 lb 12.8 oz (73.8 kg)   SpO2 97%   BMI 24.04 kg/m²    General (appearance):  No acute distress  Eyes: anicteric   Neck: soft, No JVD  Ears/Nose/Mouth/Thorat: No cyanosis  CV: RRR   Respiratory:  Clear, normal effort  GI: soft, non-tender, non-distended  Skin: Warm, dry. No rashes  Neuro/Psych: Alert and oriented x 3. Appropriate behavior  Ext:  No c/c.  No siginficant edema  Pulses:  2+ radial     Weight  Wt Readings from Last 3 Encounters:   06/27/22 162 lb 12.8 oz (73.8 kg)   05/02/22 163 lb (73.9 kg)   03/21/22 168 lb (76.2 kg)          CBC:   Lab Results   Component Value Date    WBC 6.5 03/21/2022    HGB 14.6 03/21/2022    HCT 42.9 03/21/2022    MCV 98.4 03/21/2022     03/21/2022     BMP:  Lab Results   Component Value Date    CREATININE 0.7 03/21/2022    BUN 9 03/21/2022     03/21/2022    K 4.4 03/21/2022     03/21/2022    CO2 21 03/21/2022     Mag:   Lab Results   Component Value Date    MG 2.2 12/31/2021     LIVER PROFILE:   Lab Results   Component Value Date    ALT 21 03/21/2022    AST 25 03/21/2022    ALKPHOS 77 03/21/2022    BILITOT 0.6 03/21/2022     PT/INR:   Lab Results   Component Value Date    INR 1.19 (H) 02/02/2021    INR 1.13 07/08/2018    INR 0.95 12/30/2009    PROTIME 13.8 (H) 02/02/2021    PROTIME 12.9 07/08/2018    PROTIME 10.7 12/30/2009     Pro-BNP   Lab Results   Component Value Date    PROBNP 41 01/11/2018    PROBNP 117 06/21/2017 LIPIDS:  No components found for: CHLPL  Lab Results   Component Value Date    TRIG 85 2018    TRIG 54 2011     Lab Results   Component Value Date    HDL 66 (H) 2018    HDL 74 2011     Lab Results   Component Value Date    LDLCALC 128 (H) 2018    LDLCALC 112 (H) 2011     Lab Results   Component Value Date    LABVLDL 17 2018    LABVLDL 11 2011     TSH:  Lab Results   Component Value Date    TSH 0.64 05/15/2012       IMAGIN/15/2022 Echo    - Left ventricle: The cavity size is normal. Wall thickness is     normal. Systolic function was normal. The calculated ejection     fraction was 58%. Wall motion was normal; there were no regional     wall motion abnormalities. The global longitudinal strain was     -18%. Ejection fraction (EF) was calculated using 3D full volume     imaging method. - Right ventricle: The cavity size is normal. Wall thickness is     normal. Systolic function is normal.   - Inferior vena cava: The vessel was normal in size; the     respirophasic diameter changes were in the normal range (&gt;= 50%);     findings are consistent with normal central venous pressure.        Medications:   Current Outpatient Medications   Medication Sig Dispense Refill    estrogens, conjugated, (PREMARIN) 0.625 MG tablet Take 0.625 mg by mouth daily      ALPRAZolam (XANAX) 2 MG tablet TAKE 1/2 TO 1 TABLET BY MOUTH THREE TIMES DAILY AS NEEDED FOR ANXIETY 90 tablet 0    loratadine (CLARITIN) 10 MG tablet Take 1 tablet by mouth daily 30 tablet 3    hydrALAZINE (APRESOLINE) 25 MG tablet Take 1 tablet by mouth 2 times daily 60 tablet 0    butalbital-acetaminophen-caffeine (FIORICET, ESGIC) -40 MG per tablet Take 1 tablet by mouth 2 times daily as needed for Headaches 20 tablet 0    fluticasone (FLONASE) 50 MCG/ACT nasal spray 1 spray by Each Nostril route daily 32 g 1    azelastine (ASTELIN) 0.1 % nasal spray 1 spray by Nasal route 2 times daily Use in each nostril as directed 60 mL 1    montelukast (SINGULAIR) 10 MG tablet Take 1 tablet by mouth daily 30 tablet 3    albuterol sulfate  (90 Base) MCG/ACT inhaler INHALE 2 PUFFS EVERY 4 HOURS WHILE AWAKE FOR 7-10 DAYS, THEN EVERY 6 HOURS AS NEEDED FOR WHEEZING 6.7 g 1    fluticasone-salmeterol (ADVAIR HFA) 115-21 MCG/ACT inhaler Inhale 2 puffs into the lungs daily 1 each 2    dicyclomine (BENTYL) 20 MG tablet Take 20 mg by mouth 4 times daily as needed      famotidine (PEPCID) 40 MG tablet Take 40 mg by mouth daily      rifAXIMin (XIFAXAN) 550 MG tablet Take 550 mg by mouth 3 times daily      potassium chloride (KLOR-CON M) 20 MEQ extended release tablet TAKE 1 TABLET BY MOUTH TWICE DAILY 60 tablet 5    zolpidem (AMBIEN) 10 MG tablet TAKE 1 TABLET BY MOUTH EVERY NIGHT AS NEEDED FOR SLEEP 30 tablet 5    omeprazole (PRILOSEC) 40 MG delayed release capsule TAKE 1 CAPSULE BY MOUTH EVERY DAY 30 capsule 1    flecainide (TAMBOCOR) 50 MG tablet Take 1 tablet by mouth 2 times daily 180 tablet 3    predniSONE (DELTASONE) 1 MG tablet Take 4 mg by mouth daily       metoprolol tartrate (LOPRESSOR) 25 MG tablet TAKE 2 TABLETS(50 MG) BY MOUTH TWICE DAILY 360 tablet 3    ketotifen (ZADITOR) 0.025 % ophthalmic solution INSTILL ONE DROP IN THE MORNING AND ONE DROP AT NIGHT INTO BOTH EYES  6    fluticasone-vilanterol (BREO ELLIPTA) 100-25 MCG/INH AEPB inhaler Inhale 1 puff into the lungs daily (Patient not taking: Reported on 6/27/2022) 1 each 5    torsemide (DEMADEX) 20 MG tablet Take 1 tablet by mouth daily (Patient not taking: Reported on 6/27/2022) 30 tablet 5    ELIQUIS 5 MG TABS tablet TAKE 1 TABLET BY MOUTH TWICE DAILY (Patient not taking: Reported on 6/27/2022) 180 tablet 3    leflunomide (ARAVA) 20 MG tablet Take 20 mg by mouth daily (Patient not taking: Reported on 6/27/2022)       No current facility-administered medications for this visit. Assessment:  1.  Paroxysmal atrial fibrillation (HCC) 2. Essential hypertension        Plan:  PAF: stable   flecaiide   Metoprolol   Eliquis  HTN: stable    /60    Monitor at home    [facility] to resume exercise  Follow up with Dr Toshia Spencer to discuss Watchman   Follow up with Dr Rigoberto Hernadez     35 minutes spent with patient     Reviewed most recent: CBC, BMP, LFT, Lipids, Mag, PT/INR, BNP, TSH  Reviewed most recent: ECG, Echo

## 2022-06-29 ENCOUNTER — OFFICE VISIT (OUTPATIENT)
Dept: INTERNAL MEDICINE CLINIC | Age: 57
End: 2022-06-29
Payer: OTHER GOVERNMENT

## 2022-06-29 VITALS
DIASTOLIC BLOOD PRESSURE: 88 MMHG | HEIGHT: 69 IN | TEMPERATURE: 98.6 F | WEIGHT: 157 LBS | HEART RATE: 82 BPM | BODY MASS INDEX: 23.25 KG/M2 | OXYGEN SATURATION: 99 % | SYSTOLIC BLOOD PRESSURE: 146 MMHG

## 2022-06-29 DIAGNOSIS — G62.9 NEUROPATHY: ICD-10-CM

## 2022-06-29 DIAGNOSIS — R41.3 MEMORY LOSS OR IMPAIRMENT: ICD-10-CM

## 2022-06-29 DIAGNOSIS — K64.9 ACUTE HEMORRHOID: Primary | ICD-10-CM

## 2022-06-29 DIAGNOSIS — K29.71 GASTROINTESTINAL HEMORRHAGE ASSOCIATED WITH GASTRITIS, UNSPECIFIED GASTRITIS TYPE: ICD-10-CM

## 2022-06-29 DIAGNOSIS — Z09 HOSPITAL DISCHARGE FOLLOW-UP: ICD-10-CM

## 2022-06-29 PROCEDURE — 1111F DSCHRG MED/CURRENT MED MERGE: CPT | Performed by: HOSPITALIST

## 2022-06-29 PROCEDURE — 99495 TRANSJ CARE MGMT MOD F2F 14D: CPT | Performed by: HOSPITALIST

## 2022-06-29 RX ORDER — HYDROCORTISONE ACETATE 25 MG/1
25 SUPPOSITORY RECTAL NIGHTLY
Qty: 24 SUPPOSITORY | Refills: 0 | Status: SHIPPED | OUTPATIENT
Start: 2022-06-29 | End: 2022-07-23

## 2022-06-29 ASSESSMENT — PATIENT HEALTH QUESTIONNAIRE - PHQ9
SUM OF ALL RESPONSES TO PHQ9 QUESTIONS 1 & 2: 2
2. FEELING DOWN, DEPRESSED OR HOPELESS: 1
SUM OF ALL RESPONSES TO PHQ QUESTIONS 1-9: 2
1. LITTLE INTEREST OR PLEASURE IN DOING THINGS: 1
SUM OF ALL RESPONSES TO PHQ QUESTIONS 1-9: 2

## 2022-07-07 ENCOUNTER — TELEPHONE (OUTPATIENT)
Dept: INTERNAL MEDICINE CLINIC | Age: 57
End: 2022-07-07

## 2022-07-07 ENCOUNTER — TELEPHONE (OUTPATIENT)
Dept: CARDIOLOGY CLINIC | Age: 57
End: 2022-07-07

## 2022-07-07 NOTE — TELEPHONE ENCOUNTER
The patient needs a PA on:  hydrocortisone (ANUSOL-HC) 25 MG suppository  Sig - Route: Place 1 suppository rectally nightly for 24 days

## 2022-07-07 NOTE — TELEPHONE ENCOUNTER
Spoke with pt. She was confused and thought that when she was discharged from the hospital that she was sent home with 2 months of Anucort- HC 25mg. It was one month Anucort-HC 25 mg and Dr. Samuel Betancur sent in a 1 month supply of  Anusol 25mg to the pharmacy 06/29/22. Pt informed. Says she'll pick it up today.

## 2022-07-07 NOTE — TELEPHONE ENCOUNTER
Patient was scheduled to discuss Watchman on 7/7/2022. The appointment notes says she will call to reschedule.

## 2022-07-08 NOTE — TELEPHONE ENCOUNTER
Submitted PA for Hydrocortisone Acetate 25MG suppositories  Via CMM Key: [id number] STATUS: DENIED; Denial letter attached. If this requires a response please respond to the pool ( P [facility] [address]). Thank you please advise patient.

## 2022-07-12 ENCOUNTER — TELEPHONE (OUTPATIENT)
Dept: CARDIOLOGY CLINIC | Age: 57
End: 2022-07-12

## 2022-07-15 RX ORDER — APIXABAN 5 MG/1
TABLET, FILM COATED ORAL
Qty: 180 TABLET | Refills: 3 | Status: SHIPPED | OUTPATIENT
Start: 2022-07-15

## 2022-07-15 NOTE — TELEPHONE ENCOUNTER
Requested Prescriptions     Pending Prescriptions Disp Refills    ELIQUIS 5 MG TABS tablet [Pharmacy Med Name: ELIQUIS 5MG TABLETS] 180 tablet 3     Sig: TAKE 1 TABLET BY MOUTH TWICE DAILY              Last Office Visit: 3/25/2021     Next office visit :[phone]

## 2022-07-19 NOTE — TELEPHONE ENCOUNTER
Spoke with patient today and she informed me that she wants to talk to Dr. Syd Hi on 8/8/22 and discuss Watchman with him prior to making an appointment with Mirna Covarrubias.

## 2022-07-22 DIAGNOSIS — F51.04 CHRONIC INSOMNIA: ICD-10-CM

## 2022-07-22 RX ORDER — ZOLPIDEM TARTRATE 10 MG/1
TABLET ORAL
Qty: 30 TABLET | Refills: 5 | Status: SHIPPED | OUTPATIENT
Start: 2022-07-22 | End: 2023-07-22

## 2022-07-25 ENCOUNTER — TELEPHONE (OUTPATIENT)
Dept: INTERNAL MEDICINE CLINIC | Age: 57
End: 2022-07-25

## 2022-07-25 NOTE — TELEPHONE ENCOUNTER
Pt wants to know if she should get the booster shot.  She has been in the hospital twice  this year with covid         Please advise

## 2022-08-01 ENCOUNTER — TELEPHONE (OUTPATIENT)
Dept: ENT CLINIC | Age: 57
End: 2022-08-01

## 2022-08-01 DIAGNOSIS — F41.9 ANXIETY: ICD-10-CM

## 2022-08-01 DIAGNOSIS — R51.9 ACUTE NONINTRACTABLE HEADACHE, UNSPECIFIED HEADACHE TYPE: ICD-10-CM

## 2022-08-01 RX ORDER — ALPRAZOLAM 2 MG/1
TABLET ORAL
Qty: 90 TABLET | Refills: 0 | Status: SHIPPED | OUTPATIENT
Start: 2022-08-01 | End: 2022-09-02

## 2022-08-01 NOTE — TELEPHONE ENCOUNTER
Patient called in stating that she doesn't feel comfortable taking the montelukast 10mg, due to being on another medication with a black box drug warning label. Patient would like to know if there is something else she can take.      Patient would also like a refill on Systane    Refill Request:     Last Office Visit:  5/26/2022     Next Scheduled Visit : Visit date not found     Medication Requested: [facility]    Pharmacy:    [place] [id number]  Phone: [phone] Fax: [phone]

## 2022-08-04 ENCOUNTER — TELEPHONE (OUTPATIENT)
Dept: ENT CLINIC | Age: 57
End: 2022-08-04

## 2022-08-04 DIAGNOSIS — G43.809 OTHER MIGRAINE WITHOUT STATUS MIGRAINOSUS, NOT INTRACTABLE: Primary | ICD-10-CM

## 2022-08-04 DIAGNOSIS — J30.9 ALLERGIC RHINITIS, UNSPECIFIED SEASONALITY, UNSPECIFIED TRIGGER: ICD-10-CM

## 2022-08-04 RX ORDER — BUTALBITAL, ACETAMINOPHEN AND CAFFEINE 50; 325; 40 MG/1; MG/1; MG/1
TABLET ORAL
Qty: 20 TABLET | Refills: 0 | Status: SHIPPED | OUTPATIENT
Start: 2022-08-04 | End: 2022-09-21

## 2022-08-04 RX ORDER — MONTELUKAST SODIUM 10 MG/1
10 TABLET ORAL DAILY
Qty: 30 TABLET | Refills: 3 | Status: SHIPPED | OUTPATIENT
Start: 2022-08-04

## 2022-08-04 NOTE — TELEPHONE ENCOUNTER
I called the patient and spoke with her over the phone. She states that her headaches and migraines have been quite bad recently. Nothing over-the-counter is helping at including Tylenol and ibuprofen. Mainly the only thing that seems to help is Fioricet. I will send in a prescription for some Fioricet to her pharmacy. Fortunately she is setting up an appointment with a neurologist for other neurologic related issues including neuropathy and memory loss, and I highly encouraged her to discuss her migraines with her neurologist at her upcoming appointment and to seek appropriate treatment as Fioricet can certainly worsen headaches secondary to tachyphylaxis. I will also refill her Singulair. She has taken this in the past without issue but was a little hesitant to restart taking it given the black box warning. Discussed possible side effects including psychiatric effects. She will watch out for any of the side effects while taking and and let us know if she has any issues going forward.

## 2022-08-04 NOTE — TELEPHONE ENCOUNTER
Refill Request butalbital-acetaminophen-caffeine    Last Office Visit:  5/26/2022     Next Scheduled Visit : Visit date not found     Medication Requested:    Pharmacy:    [address], [address] [place] [id number]  Phone: [phone] Fax: [phone]        She would like a call back with the refill she said over the counter medication is not helping her.

## 2022-08-08 ENCOUNTER — OFFICE VISIT (OUTPATIENT)
Dept: CARDIOLOGY CLINIC | Age: 57
End: 2022-08-08
Payer: OTHER GOVERNMENT

## 2022-08-08 VITALS
BODY MASS INDEX: 23.64 KG/M2 | HEIGHT: 69 IN | WEIGHT: 159.6 LBS | DIASTOLIC BLOOD PRESSURE: 72 MMHG | SYSTOLIC BLOOD PRESSURE: 110 MMHG | OXYGEN SATURATION: 98 % | HEART RATE: 86 BPM

## 2022-08-08 DIAGNOSIS — R94.31 ABNORMAL EKG: ICD-10-CM

## 2022-08-08 DIAGNOSIS — I48.0 PAROXYSMAL ATRIAL FIBRILLATION (HCC): Primary | ICD-10-CM

## 2022-08-08 DIAGNOSIS — I10 ESSENTIAL HYPERTENSION: ICD-10-CM

## 2022-08-08 PROCEDURE — 3017F COLORECTAL CA SCREEN DOC REV: CPT | Performed by: INTERNAL MEDICINE

## 2022-08-08 PROCEDURE — 99214 OFFICE O/P EST MOD 30 MIN: CPT | Performed by: INTERNAL MEDICINE

## 2022-08-08 PROCEDURE — G8420 CALC BMI NORM PARAMETERS: HCPCS | Performed by: INTERNAL MEDICINE

## 2022-08-08 PROCEDURE — 1036F TOBACCO NON-USER: CPT | Performed by: INTERNAL MEDICINE

## 2022-08-08 PROCEDURE — G8427 DOCREV CUR MEDS BY ELIG CLIN: HCPCS | Performed by: INTERNAL MEDICINE

## 2022-08-08 ASSESSMENT — ENCOUNTER SYMPTOMS
CHOKING: 0
CHEST TIGHTNESS: 0
SHORTNESS OF BREATH: 0
COUGH: 0

## 2022-08-08 NOTE — PROGRESS NOTES
Subjective:      Patient ID: Sarika Conway is a 61 y.o. female. Fatigue  Pertinent negatives include no chest pain, coughing or fatigue. Here for follow up afib/htn/abn ekg. Recent hosp HTN urg/GIB/Vag bleed. No exertional.  No syncope. No pnd or orthopnea. No fevers. No cough. Breathing stable. Rhythm stable. bp reasonable.  with prostate issues. Past Medical History:   Diagnosis Date    Alcohol abuse, in remission     Anxiety state, unspecified     COVID     Depressive disorder, not elsewhere classified     Diffuse cystic mastopathy     Extrinsic asthma, unspecified     Hypertension     Leiomyoma of uterus, unspecified     Other and unspecified ovarian cyst      Past Surgical History:   Procedure Laterality Date    CHOLECYSTECTOMY      HYSTERECTOMY (CERVIX STATUS UNKNOWN)       Social History     Socioeconomic History    Marital status:      Spouse name: Not on file    Number of children: Not on file    Years of education: Not on file    Highest education level: Not on file   Occupational History    Not on file   Tobacco Use    Smoking status: Never    Smokeless tobacco: Never   Substance and Sexual Activity    Alcohol use:  Yes     Alcohol/week: 1.0 - 2.0 standard drink     Types: 1 - 2 Glasses of wine per week     Comment: 2 drinks qd    Drug use: No    Sexual activity: Yes     Partners: Male   Other Topics Concern    Not on file   Social History Narrative    Not on file     Social Determinants of Health     Financial Resource Strain: Low Risk     Difficulty of Paying Living Expenses: Not hard at all   Food Insecurity: No Food Insecurity    Worried About Running Out of Food in the Last Year: Never true    Ran Out of Food in the Last Year: Never true   Transportation Needs: Not on file   Physical Activity: Not on file   Stress: Not on file   Social Connections: Not on file   Intimate Partner Violence: Not on file   Housing Stability: Not on file        reviewed, denies FH cardiac issues. Vitals:    08/08/22 1315   BP: 110/72   Pulse: 86   SpO2: 98%         Wt 214    Review of Systems   Constitutional:  Negative for activity change, appetite change and fatigue. Respiratory:  Negative for cough, choking, chest tightness and shortness of breath. Cardiovascular:  Negative for chest pain, palpitations and leg swelling. Denies PND or orthopnea. No tachycardia or syncope. Neurological:  Negative for dizziness, syncope and light-headedness. Psychiatric/Behavioral:  Negative for agitation, behavioral problems and confusion. All other systems reviewed negative as done. Objective:   Physical Exam  Constitutional:       General: She is not in acute distress. Appearance: She is well-developed. HENT:      Head: Normocephalic and atraumatic. Eyes:      General:         Right eye: No discharge. Left eye: No discharge. Conjunctiva/sclera: Conjunctivae normal.   Neck:      Vascular: No JVD. Cardiovascular:      Rate and Rhythm: Normal rate and regular rhythm. Pulses:           Radial pulses are 2+ on the right side and 2+ on the left side. Heart sounds: Normal heart sounds, S1 normal and S2 normal. No murmur heard. No gallop. Pulmonary:      Effort: Pulmonary effort is normal. No respiratory distress. Breath sounds: Normal breath sounds. No wheezing or rales. Abdominal:      General: Bowel sounds are normal.      Palpations: Abdomen is soft. Tenderness: There is no abdominal tenderness. Musculoskeletal:         General: No tenderness. Normal range of motion. Cervical back: Normal range of motion. Skin:     General: Skin is warm and dry. Neurological:      Mental Status: She is alert and oriented to person, place, and time. Psychiatric:         Behavior: Behavior normal.         Thought Content: Thought content normal.       Assessment:       Diagnosis Orders   1. Paroxysmal atrial fibrillation (HCC)        2. Essential hypertension        3. Abnormal EKG              Plan:      CV stable. No further bleeding. Rhythm stable. Continue lopressor 50 mg bid for HTN/afib. BP good. Not on hydralzaine. Will continue Eliquis 5 mg bid for CVA proph/afib. Will continue lopressor 50 mg bid/flecanide 50 mg bid for afib. Has been told about watchman. Will watch for now. Reviewed outside records 10/17 at [facility]. (Head CT negative. CTPA negative for PE. Echo showing normal LV function mild TR with RVSP 36. Myoview  negative for ischemia by scan.) GI/ Liver issues. Follow up 6-8 wks.

## 2022-08-10 NOTE — TELEPHONE ENCOUNTER
Dr. Becka Price saw patient and spoke with patient regarding Watchman at this time Dr. Becka Price feels we should hold off from doing any procedure. Spoke with patient today and she feels the same way. I encouraged patient to let the Cardiology office know if she has any new issues related to bleeding on Eliquis and we can re-visit at that point.

## 2022-09-01 DIAGNOSIS — F41.9 ANXIETY: ICD-10-CM

## 2022-09-02 DIAGNOSIS — F41.9 ANXIETY: ICD-10-CM

## 2022-09-02 RX ORDER — ALPRAZOLAM 2 MG/1
TABLET ORAL
Qty: 90 TABLET | OUTPATIENT
Start: 2022-09-02

## 2022-09-02 RX ORDER — ALPRAZOLAM 2 MG/1
TABLET ORAL
Qty: 90 TABLET | Refills: 0 | Status: SHIPPED | OUTPATIENT
Start: 2022-09-02 | End: 2022-09-21 | Stop reason: SDUPTHER

## 2022-09-02 NOTE — TELEPHONE ENCOUNTER
Refill-    ALPRAZolam Jaxson Chadwick) 2 MG tablet      Lyudmila Rivera [address] [facility], [address] [facility], [facility] [place] [address]   Phone:  [phone]  Fax:  [phone]        Please advise

## 2022-09-07 ENCOUNTER — HOSPITAL ENCOUNTER (OUTPATIENT)
Dept: MAMMOGRAPHY | Age: 57
Discharge: HOME | End: 2022-09-07
Payer: OTHER GOVERNMENT

## 2022-09-07 VITALS — WEIGHT: 159 LBS | BODY MASS INDEX: 23.55 KG/M2 | HEIGHT: 69 IN

## 2022-09-07 DIAGNOSIS — Z12.31 VISIT FOR SCREENING MAMMOGRAM: ICD-10-CM

## 2022-09-07 PROCEDURE — 77063 BREAST TOMOSYNTHESIS BI: CPT

## 2022-09-12 ENCOUNTER — TELEPHONE (OUTPATIENT)
Dept: INTERNAL MEDICINE CLINIC | Age: 57
End: 2022-09-12

## 2022-09-12 NOTE — TELEPHONE ENCOUNTER
Jacquelyn Dennis from Nurse Triage Call:    Pt currently has rectal bleeding and has a Hx of rectal bleeding and having stomach pains. Pt declined making an appointment to be seen, she has an appt on 9/21/22. Also declined another's Physicians recommendation. Pt states Dr. Tracy Payne knows her hx and recommended a medication but pt doesn't know the name. Pt states she wants Dr. Tracy Payne to give his recommendation but if bleeding gets worse she will go to the ER.

## 2022-09-13 DIAGNOSIS — K62.5 RECTAL BLEEDING: Primary | ICD-10-CM

## 2022-09-15 NOTE — TELEPHONE ENCOUNTER
I spoke with pt and she was given the information for the Referral. She will contact the GI doctor. She is not sure if she is going to the ED her son had her start Kefir probiotic and she has been taking it for days and the bleeding has stopped.

## 2022-09-19 ENCOUNTER — OFFICE VISIT (OUTPATIENT)
Dept: CARDIOLOGY CLINIC | Age: 57
End: 2022-09-19
Payer: OTHER GOVERNMENT

## 2022-09-19 VITALS
HEART RATE: 94 BPM | DIASTOLIC BLOOD PRESSURE: 78 MMHG | OXYGEN SATURATION: 97 % | BODY MASS INDEX: 24.07 KG/M2 | SYSTOLIC BLOOD PRESSURE: 100 MMHG | WEIGHT: 163 LBS

## 2022-09-19 DIAGNOSIS — I10 ESSENTIAL HYPERTENSION: ICD-10-CM

## 2022-09-19 DIAGNOSIS — R94.31 ABNORMAL EKG: ICD-10-CM

## 2022-09-19 DIAGNOSIS — K62.5 RECTAL BLEEDING: ICD-10-CM

## 2022-09-19 DIAGNOSIS — I48.0 PAROXYSMAL ATRIAL FIBRILLATION (HCC): Primary | ICD-10-CM

## 2022-09-19 LAB
A/G RATIO: 2.4 (ref 1.1–2.2)
ALBUMIN SERPL-MCNC: 4.8 G/DL (ref 3.4–5)
ALP BLD-CCNC: 61 U/L (ref 40–129)
ALT SERPL-CCNC: 20 U/L (ref 10–40)
ANION GAP SERPL CALCULATED.3IONS-SCNC: 17 MMOL/L (ref 3–16)
AST SERPL-CCNC: 23 U/L (ref 15–37)
BILIRUB SERPL-MCNC: 0.3 MG/DL (ref 0–1)
BUN BLDV-MCNC: 12 MG/DL (ref 7–20)
CALCIUM SERPL-MCNC: 9.5 MG/DL (ref 8.3–10.6)
CHLORIDE BLD-SCNC: 99 MMOL/L (ref 99–110)
CO2: 23 MMOL/L (ref 21–32)
CREAT SERPL-MCNC: 0.7 MG/DL (ref 0.6–1.1)
GFR AFRICAN AMERICAN: >60
GFR NON-AFRICAN AMERICAN: >60
GLUCOSE BLD-MCNC: 100 MG/DL (ref 70–99)
HCT VFR BLD CALC: 43.7 % (ref 36–48)
HEMOGLOBIN: 14.7 G/DL (ref 12–16)
IRON SATURATION: 41 % (ref 15–50)
IRON: 137 UG/DL (ref 37–145)
MCH RBC QN AUTO: 33.2 PG (ref 26–34)
MCHC RBC AUTO-ENTMCNC: 33.6 G/DL (ref 31–36)
MCV RBC AUTO: 98.9 FL (ref 80–100)
PDW BLD-RTO: 13.1 % (ref 12.4–15.4)
PLATELET # BLD: 245 K/UL (ref 135–450)
PMV BLD AUTO: 7.5 FL (ref 5–10.5)
POTASSIUM SERPL-SCNC: 4.2 MMOL/L (ref 3.5–5.1)
RBC # BLD: 4.42 M/UL (ref 4–5.2)
SODIUM BLD-SCNC: 139 MMOL/L (ref 136–145)
TOTAL IRON BINDING CAPACITY: 336 UG/DL (ref 260–445)
TOTAL PROTEIN: 6.8 G/DL (ref 6.4–8.2)
WBC # BLD: 7.3 K/UL (ref 4–11)

## 2022-09-19 PROCEDURE — G8420 CALC BMI NORM PARAMETERS: HCPCS | Performed by: INTERNAL MEDICINE

## 2022-09-19 PROCEDURE — 1036F TOBACCO NON-USER: CPT | Performed by: INTERNAL MEDICINE

## 2022-09-19 PROCEDURE — 3017F COLORECTAL CA SCREEN DOC REV: CPT | Performed by: INTERNAL MEDICINE

## 2022-09-19 PROCEDURE — G8427 DOCREV CUR MEDS BY ELIG CLIN: HCPCS | Performed by: INTERNAL MEDICINE

## 2022-09-19 PROCEDURE — 99214 OFFICE O/P EST MOD 30 MIN: CPT | Performed by: INTERNAL MEDICINE

## 2022-09-19 RX ORDER — FAMOTIDINE 40 MG/1
40 TABLET, FILM COATED ORAL PRN
COMMUNITY

## 2022-09-19 RX ORDER — POTASSIUM CHLORIDE 20 MEQ/1
TABLET, EXTENDED RELEASE ORAL
Qty: 60 TABLET | Refills: 5 | Status: SHIPPED | OUTPATIENT
Start: 2022-09-19

## 2022-09-19 RX ORDER — TRAZODONE HYDROCHLORIDE 100 MG/1
100 TABLET ORAL NIGHTLY
COMMUNITY

## 2022-09-19 ASSESSMENT — ENCOUNTER SYMPTOMS
COUGH: 0
SHORTNESS OF BREATH: 0
CHOKING: 0
CHEST TIGHTNESS: 0

## 2022-09-19 NOTE — PROGRESS NOTES
Subjective:      Patient ID: Miles Andrew is a 61 y.o. female. Fatigue  Pertinent negatives include no chest pain, coughing or fatigue. Atrial Fibrillation  Symptoms are negative for chest pain, dizziness, palpitations and shortness of breath. Here for follow up afib/htn/abn ekg. Recent hosp HTN urg/GIB/Vag bleed. Down from neuropathy/RA. Fatigue. Not real active. No exertional.  No syncope. No pnd or orthopnea. Breathing stable. Rhythm stable. bp reasonable. Past Medical History:   Diagnosis Date    Alcohol abuse, in remission     Anxiety state, unspecified     COVID     Depressive disorder, not elsewhere classified     Diffuse cystic mastopathy     Extrinsic asthma, unspecified     Hypertension     Leiomyoma of uterus, unspecified     Other and unspecified ovarian cyst      Past Surgical History:   Procedure Laterality Date    CHOLECYSTECTOMY      HYSTERECTOMY (CERVIX STATUS UNKNOWN)       Social History     Socioeconomic History    Marital status:      Spouse name: Not on file    Number of children: Not on file    Years of education: Not on file    Highest education level: Not on file   Occupational History    Not on file   Tobacco Use    Smoking status: Never    Smokeless tobacco: Never   Substance and Sexual Activity    Alcohol use: Yes     Alcohol/week: 1.0 - 2.0 standard drink     Types: 1 - 2 Glasses of wine per week     Comment: 2 drinks qd    Drug use: No    Sexual activity: Yes     Partners: Male   Other Topics Concern    Not on file   Social History Narrative    Not on file     Social Determinants of Health     Financial Resource Strain: Not on file   Food Insecurity: Not on file   Transportation Needs: Not on file   Physical Activity: Not on file   Stress: Not on file   Social Connections: Not on file   Intimate Partner Violence: Not on file   Housing Stability: Not on file       FH reviewed, denies FH cardiac issues.       Vitals:    09/19/22 1352   BP: 100/78   Pulse: 94 SpO2: 97%             Wt 214    Review of Systems   Constitutional:  Negative for activity change, appetite change and fatigue. Respiratory:  Negative for cough, choking, chest tightness and shortness of breath. Cardiovascular:  Negative for chest pain, palpitations and leg swelling. Denies PND or orthopnea. No tachycardia or syncope. Neurological:  Negative for dizziness, syncope and light-headedness. Psychiatric/Behavioral:  Negative for agitation, behavioral problems and confusion. All other systems reviewed negative as done. Objective:   Physical Exam  Constitutional:       General: She is not in acute distress. Appearance: She is well-developed. HENT:      Head: Normocephalic and atraumatic. Eyes:      General:         Right eye: No discharge. Left eye: No discharge. Conjunctiva/sclera: Conjunctivae normal.   Neck:      Vascular: No JVD. Cardiovascular:      Rate and Rhythm: Normal rate and regular rhythm. Pulses:           Radial pulses are 2+ on the right side and 2+ on the left side. Heart sounds: Normal heart sounds, S1 normal and S2 normal. No murmur heard. No gallop. Pulmonary:      Effort: Pulmonary effort is normal. No respiratory distress. Breath sounds: Normal breath sounds. No wheezing or rales. Abdominal:      General: Bowel sounds are normal.      Palpations: Abdomen is soft. Tenderness: There is no abdominal tenderness. Musculoskeletal:         General: No tenderness. Normal range of motion. Cervical back: Normal range of motion. Skin:     General: Skin is warm and dry. Neurological:      Mental Status: She is alert and oriented to person, place, and time. Psychiatric:         Behavior: Behavior normal.         Thought Content: Thought content normal.       Assessment:       Diagnosis Orders   1. Paroxysmal atrial fibrillation (HCC)        2. Essential hypertension        3.  Abnormal EKG                Plan: CV stable. Rhythm stable. Continue lopressor 50 mg bid for HTN/afib. BP good. Will continue Eliquis 5 mg bid for CVA proph/afib. Will continue lopressor 50 mg bid/flecanide 50 mg bid for afib. Has been told about watchman. Will watch for now. Reviewed outside records 10/17 at [facility]. (Head CT negative. CTPA negative for PE. Echo showing normal LV function mild TR with RVSP 36. Myoview  negative for ischemia by scan.) GI/ Liver issues. Follow up 3 months.

## 2022-09-21 ENCOUNTER — OFFICE VISIT (OUTPATIENT)
Dept: INTERNAL MEDICINE CLINIC | Age: 57
End: 2022-09-21
Payer: OTHER GOVERNMENT

## 2022-09-21 VITALS
OXYGEN SATURATION: 96 % | BODY MASS INDEX: 23.88 KG/M2 | DIASTOLIC BLOOD PRESSURE: 84 MMHG | HEART RATE: 74 BPM | TEMPERATURE: 97.2 F | WEIGHT: 161.2 LBS | SYSTOLIC BLOOD PRESSURE: 126 MMHG | HEIGHT: 69 IN

## 2022-09-21 DIAGNOSIS — L65.9 ALOPECIA: Primary | ICD-10-CM

## 2022-09-21 DIAGNOSIS — M06.09 RHEUMATOID ARTHRITIS OF MULTIPLE SITES WITH NEGATIVE RHEUMATOID FACTOR (HCC): ICD-10-CM

## 2022-09-21 DIAGNOSIS — F41.9 ANXIETY: ICD-10-CM

## 2022-09-21 DIAGNOSIS — H53.9 VISUAL DISTURBANCE: ICD-10-CM

## 2022-09-21 PROCEDURE — 3017F COLORECTAL CA SCREEN DOC REV: CPT | Performed by: HOSPITALIST

## 2022-09-21 PROCEDURE — G8427 DOCREV CUR MEDS BY ELIG CLIN: HCPCS | Performed by: HOSPITALIST

## 2022-09-21 PROCEDURE — G8420 CALC BMI NORM PARAMETERS: HCPCS | Performed by: HOSPITALIST

## 2022-09-21 PROCEDURE — 99214 OFFICE O/P EST MOD 30 MIN: CPT | Performed by: HOSPITALIST

## 2022-09-21 PROCEDURE — 1036F TOBACCO NON-USER: CPT | Performed by: HOSPITALIST

## 2022-09-21 RX ORDER — ALPRAZOLAM 2 MG/1
TABLET ORAL
Qty: 90 TABLET | Refills: 0 | Status: SHIPPED | OUTPATIENT
Start: 2022-09-21 | End: 2022-11-02

## 2022-09-21 SDOH — ECONOMIC STABILITY: FOOD INSECURITY: WITHIN THE PAST 12 MONTHS, THE FOOD YOU BOUGHT JUST DIDN'T LAST AND YOU DIDN'T HAVE MONEY TO GET MORE.: NEVER TRUE

## 2022-09-21 SDOH — ECONOMIC STABILITY: FOOD INSECURITY: WITHIN THE PAST 12 MONTHS, YOU WORRIED THAT YOUR FOOD WOULD RUN OUT BEFORE YOU GOT MONEY TO BUY MORE.: NEVER TRUE

## 2022-09-21 ASSESSMENT — ENCOUNTER SYMPTOMS
BACK PAIN: 0
VOMITING: 0
DIARRHEA: 0
BLOOD IN STOOL: 0
SORE THROAT: 0
ABDOMINAL PAIN: 0
SINUS PRESSURE: 0
TROUBLE SWALLOWING: 0
NAUSEA: 0
VOICE CHANGE: 0
COUGH: 0
SHORTNESS OF BREATH: 0
SINUS PAIN: 0
ABDOMINAL DISTENTION: 0
CONSTIPATION: 0
CHEST TIGHTNESS: 0
WHEEZING: 0

## 2022-09-21 ASSESSMENT — SOCIAL DETERMINANTS OF HEALTH (SDOH): HOW HARD IS IT FOR YOU TO PAY FOR THE VERY BASICS LIKE FOOD, HOUSING, MEDICAL CARE, AND HEATING?: NOT HARD AT ALL

## 2022-09-21 NOTE — PROGRESS NOTES
[facility] Internal Medicine  Follow-up visit   2022    Faraz Hoover (:  1965) is a 61 y.o. female, here for follow-up:    Chief Complaint   Patient presents with    Hypertension    Post-COVID Symptoms     Having balance problems and other symptoms          HPI  -RA:  Diagnosed thirty years ago. Currently on Arava and prednisone. Sees Dr. Akanksha Rose once monthly. -Afib: Sees Dr. Cheryl Storm. Well controlled on lopressor/flecanide. Eliquis for CVA prophylaxis.       -Hx of MDD/PTSD:  Father  in . Nasty divorce . Then there was a stalker. Father committed suicide. Son is retired  and has PTSD. She has night terrors. She is on trazadone 100, ambien 10, prn Xanex.         - 21 day hospitalization for COVID-19.       -Ongoing problems with alopecia, very brittle nails and perhaps fumgal infection refractory to courses of Nizoril.       -Hasn't worked since . 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 ROS  Review of Systems   Constitutional:  Negative for activity change, appetite change, chills, diaphoresis, fatigue, fever and unexpected weight change. HENT:  Negative for sinus pressure, sinus pain, sore throat, trouble swallowing and voice change. Eyes:  Negative for visual disturbance. Respiratory:  Negative for cough, chest tightness, shortness of breath and wheezing. Cardiovascular:  Negative for chest pain, palpitations and leg swelling. Gastrointestinal:  Negative for abdominal distention, abdominal pain, blood in stool, constipation, diarrhea, nausea and vomiting. Endocrine: Negative for polydipsia and polyphagia. Genitourinary:  Negative for decreased urine volume, difficulty urinating, dysuria and urgency. Musculoskeletal:  Negative for back pain, gait problem, joint swelling and myalgias. Neurological:  Negative for dizziness, seizures, syncope, light-headedness and headaches.    Psychiatric/Behavioral:  Positive for dysphoric mood and sleep disturbance. Negative for agitation, behavioral problems, confusion and suicidal ideas. The patient is nervous/anxious. HISTORIES  Current Outpatient Medications on File Prior to Visit   Medication Sig Dispense Refill    potassium chloride (KLOR-CON M) 20 MEQ extended release tablet TAKE 1 TABLET BY MOUTH TWICE DAILY 60 tablet 5    traZODone (DESYREL) 100 MG tablet Take 100 mg by mouth nightly      famotidine (PEPCID) 40 MG tablet Take 40 mg by mouth as needed      Acetaminophen-Codeine (TYLENOL WITH CODEINE #3 PO) Take by mouth daily      HYDRALAZINE HCL PO Take by mouth as needed      montelukast (SINGULAIR) 10 MG tablet Take 1 tablet by mouth in the morning. 30 tablet 3    zolpidem (AMBIEN) 10 MG tablet TAKE 1 TABLET BY MOUTH EVERY NIGHT AS NEEDED FOR SLEEP 30 tablet 5    ELIQUIS 5 MG TABS tablet TAKE 1 TABLET BY MOUTH TWICE DAILY 180 tablet 3    estrogens, conjugated, (PREMARIN) 0.625 MG tablet Take 0.625 mg by mouth daily      azelastine (ASTELIN) 0.1 % nasal spray 1 spray by Nasal route 2 times daily Use in each nostril as directed 60 mL 1    albuterol sulfate  (90 Base) MCG/ACT inhaler INHALE 2 PUFFS EVERY 4 HOURS WHILE AWAKE FOR 7-10 DAYS, THEN EVERY 6 HOURS AS NEEDED FOR WHEEZING 6.7 g 1    fluticasone-salmeterol (ADVAIR HFA) 115-21 MCG/ACT inhaler Inhale 2 puffs into the lungs daily 1 each 2    dicyclomine (BENTYL) 20 MG tablet Take 20 mg by mouth 4 times daily as needed      flecainide (TAMBOCOR) 50 MG tablet Take 1 tablet by mouth 2 times daily 180 tablet 3    predniSONE (DELTASONE) 1 MG tablet Take 4 mg by mouth daily       metoprolol tartrate (LOPRESSOR) 25 MG tablet TAKE 2 TABLETS(50 MG) BY MOUTH TWICE DAILY 360 tablet 3    leflunomide (ARAVA) 20 MG tablet Take 20 mg by mouth daily        No current facility-administered medications on file prior to visit.       Allergies   Allergen Reactions    Amlodipine Other (See Comments)     edema    Bystolic [Nebivolol Hcl] Other (See Comments) Hair falling out    Demerol Hives    Dilaudid [Hydromorphone Hcl] Hives    Losartan Other (See Comments)     Possible arthralgias    Penicillins     Spironolactone Other (See Comments) and Hives     ? Sulfa Antibiotics     Iodides Nausea And Vomiting     Radioactive dye, sever h/a high b/p     Past Medical History:   Diagnosis Date    Alcohol abuse, in remission     Anxiety state, unspecified     COVID     Depressive disorder, not elsewhere classified     Diffuse cystic mastopathy     Extrinsic asthma, unspecified     Hypertension     Leiomyoma of uterus, unspecified     Other and unspecified ovarian cyst      Patient Active Problem List   Diagnosis    Asthma    Diffuse cystic mastopathy    Gestational diabetes    Essential hypertension    Chronic insomnia    Alcohol abuse    Adjustment disorder with anxiety    Menopausal symptoms    GERD (gastroesophageal reflux disease)    Overweight (BMI 25.0-29. 9)    Obstructive sleep apnea syndrome    Paroxysmal atrial fibrillation (HCC)    Environmental allergies    Other fatigue    Steatohepatitis    Attention deficit disorder (ADD)    Cognitive impairment    Dry skin dermatitis    Gastroesophageal reflux disease without esophagitis    Primary insomnia    Rheumatoid arthritis of multiple sites with negative rheumatoid factor (HCC)     Past Surgical History:   Procedure Laterality Date    CHOLECYSTECTOMY      HYSTERECTOMY (CERVIX STATUS UNKNOWN)       Social History     Socioeconomic History    Marital status:      Spouse name: Not on file    Number of children: Not on file    Years of education: Not on file    Highest education level: Not on file   Occupational History    Not on file   Tobacco Use    Smoking status: Never    Smokeless tobacco: Never   Substance and Sexual Activity    Alcohol use:  Yes     Alcohol/week: 1.0 - 2.0 standard drink     Types: 1 - 2 Glasses of wine per week     Comment: 2 drinks qd    Drug use: No    Sexual activity: Yes     Partners: Male Other Topics Concern    Not on file   Social History Narrative    Not on file     Social Determinants of Health     Financial Resource Strain: Low Risk     Difficulty of Paying Living Expenses: Not hard at all   Food Insecurity: No Food Insecurity    Worried About Running Out of Food in the Last Year: Never true    Ran Out of Food in the Last Year: Never true   Transportation Needs: Not on file   Physical Activity: Not on file   Stress: Not on file   Social Connections: Not on file   Intimate Partner Violence: Not on file   Housing Stability: Not on file      Family History   Problem Relation Age of Onset    Breast Cancer Neg Hx     Ovarian Cancer Neg Hx        PE  Vitals:    09/21/22 1320   BP: 126/84   Site: Left Upper Arm   Position: Sitting   Pulse: 74   Temp: 97.2 °F (36.2 °C)   SpO2: 96%   Weight: 161 lb 3.2 oz (73.1 kg)   Height: 5' 9\" (1.753 m)     Estimated body mass index is 23.81 kg/m² as calculated from the following:    Height as of this encounter: 5' 9\" (1.753 m). Weight as of this encounter: 161 lb 3.2 oz (73.1 kg). Physical Exam    ASSESSMENT/ PLAN:  1. Rheumatoid arthritis of multiple sites with negative rheumatoid factor ([facility] [address]  -Continued care as above. 2. Alopecia  *- Faviola Bender MD, Dermatology, [facility]    3. Anxiety  - ALPRAZolam (XANAX) 2 MG tablet; TAKE 1/2 TO 1 TABLET BY MOUTH THREE TIMES DAILY AS NEEDED FOR ANXIETY  Dispense: 90 tablet;  Refill: 0     Orders Placed This Encounter   Procedures    Faviola Bender MD, Dermatology, Mark Ayoub MD, (Comprehensive Ophthalmology, Cataract Surgery) Ophthalmology, [facility]        Orders Placed This Encounter   Medications    ALPRAZolam (XANAX) 2 MG tablet     Sig: TAKE 1/2 TO 1 TABLET BY MOUTH THREE TIMES DAILY AS NEEDED FOR ANXIETY     Dispense:  90 tablet     Refill:  0        Medications Discontinued During This Encounter   Medication Reason butalbital-acetaminophen-caffeine (FIORICET, ESGIC) -40 MG per tablet LIST CLEANUP    omeprazole (PRILOSEC) 40 MG delayed release capsule LIST CLEANUP    rifAXIMin (XIFAXAN) 550 MG tablet LIST CLEANUP    torsemide (DEMADEX) 20 MG tablet LIST CLEANUP    ALPRAZolam (XANAX) 2 MG tablet REORDER        Return in about 3 months (around 12/21/2022). Cheri Larios MD    This dictation was generated by voice recognition computer software. Although all attempts are made to edit the dictation for accuracy, there may be errors in the transcription that are not intended.

## 2022-09-30 ENCOUNTER — TELEPHONE (OUTPATIENT)
Dept: INTERNAL MEDICINE CLINIC | Age: 57
End: 2022-09-30

## 2022-10-03 ENCOUNTER — OFFICE VISIT (OUTPATIENT)
Dept: DERMATOLOGY | Age: 57
End: 2022-10-03
Payer: OTHER GOVERNMENT

## 2022-10-03 DIAGNOSIS — B35.1 ONYCHOMYCOSIS: ICD-10-CM

## 2022-10-03 DIAGNOSIS — D22.60 MULTIPLE BENIGN MELANOCYTIC NEVI OF UPPER EXTREMITY, LOWER EXTREMITY, AND TRUNK: ICD-10-CM

## 2022-10-03 DIAGNOSIS — L81.4 LENTIGINES: ICD-10-CM

## 2022-10-03 DIAGNOSIS — R21 RASH AND OTHER NONSPECIFIC SKIN ERUPTION: Primary | ICD-10-CM

## 2022-10-03 DIAGNOSIS — L65.9 NON-SCARRING ALOPECIA: ICD-10-CM

## 2022-10-03 DIAGNOSIS — D22.70 MULTIPLE BENIGN MELANOCYTIC NEVI OF UPPER EXTREMITY, LOWER EXTREMITY, AND TRUNK: ICD-10-CM

## 2022-10-03 DIAGNOSIS — D22.5 MULTIPLE BENIGN MELANOCYTIC NEVI OF UPPER EXTREMITY, LOWER EXTREMITY, AND TRUNK: ICD-10-CM

## 2022-10-03 DIAGNOSIS — L82.1 OTHER SEBORRHEIC KERATOSIS: ICD-10-CM

## 2022-10-03 PROCEDURE — G8484 FLU IMMUNIZE NO ADMIN: HCPCS | Performed by: INTERNAL MEDICINE

## 2022-10-03 PROCEDURE — 3017F COLORECTAL CA SCREEN DOC REV: CPT | Performed by: INTERNAL MEDICINE

## 2022-10-03 PROCEDURE — 99204 OFFICE O/P NEW MOD 45 MIN: CPT | Performed by: INTERNAL MEDICINE

## 2022-10-03 PROCEDURE — 1036F TOBACCO NON-USER: CPT | Performed by: INTERNAL MEDICINE

## 2022-10-03 PROCEDURE — G8427 DOCREV CUR MEDS BY ELIG CLIN: HCPCS | Performed by: INTERNAL MEDICINE

## 2022-10-03 PROCEDURE — G8420 CALC BMI NORM PARAMETERS: HCPCS | Performed by: INTERNAL MEDICINE

## 2022-10-03 RX ORDER — KETOCONAZOLE 20 MG/ML
SHAMPOO TOPICAL
Qty: 120 ML | Refills: 3 | Status: SHIPPED | OUTPATIENT
Start: 2022-10-03

## 2022-10-03 NOTE — PROGRESS NOTES
[facility]  Adan Barajas MD  [phone]    Date of Visit: 10/3/2022    Carrie Sylvester is a 61 y.o. female who presents for skin lesions. New pt    Chief Complaint:   Chief Complaint   Patient presents with    Skin Exam     Spot on nose and skin peeling        History of Present Illness:    Concern: Alopecia  Location: All over scalp  Duration:  Last few months  Symptoms: A lot of hair loss in comb brush, having to wear a wig  Previous treatments:  Biotin    Concerns: Peeling of L side of face  Previous treatment:  -Jeremias products--stopped  Current trmt:  -Neutrogena moisturizer, wears make up  Effect of trmt: Still occurs    Concerns: All finger/toenails peeled off  Duration: Started few months ago (had COVID in January 2022)  Previous treatments:  -Eucerin  Effect of trmt: Fingernails all resolved, feels L great toe did not    Patient denies any other new or changing skin lesions. They would like all of their skin lesions to be evaluated for skin cancer. *Personal history of skin cancer: AKs  *Family history of skin cancer: None    Review of Systems:  Gen: Feels well, good sense of health. Skin: No new or changing moles, no history of keloids or hypertrophic scars. Past Medical History, Family History, Surgical History, Medications and Allergies reviewed.     Past Medical History:   Diagnosis Date    Alcohol abuse, in remission     Anxiety state, unspecified     COVID     Depressive disorder, not elsewhere classified     Diffuse cystic mastopathy     Extrinsic asthma, unspecified     Hypertension     Leiomyoma of uterus, unspecified     Other and unspecified ovarian cyst      Past Surgical History:   Procedure Laterality Date    CHOLECYSTECTOMY      HYSTERECTOMY (CERVIX STATUS UNKNOWN)         Allergies   Allergen Reactions    Amlodipine Other (See Comments)     edema    Bystolic [Nebivolol Hcl] Other (See Comments)     Hair falling out    Demerol Hives    Dilaudid [Hydromorphone Hcl] Hives    Losartan Other (See Comments)     Possible arthralgias    Penicillins     Spironolactone Other (See Comments) and Hives     ? Sulfa Antibiotics     Iodides Nausea And Vomiting     Radioactive dye, sever h/a high b/p     Outpatient Medications Marked as Taking for the 10/3/22 encounter (Office Visit) with Tyson Barajas MD   Medication Sig Dispense Refill    ciclopirox (PENLAC) 8 % solution Apply to affected left great toenail nightly Monday-Saturday. Wipe off with alcohol pad on Sunday 3.3 mL 4    ALPRAZolam (XANAX) 2 MG tablet TAKE 1/2 TO 1 TABLET BY MOUTH THREE TIMES DAILY AS NEEDED FOR ANXIETY 90 tablet 0    potassium chloride (KLOR-CON M) 20 MEQ extended release tablet TAKE 1 TABLET BY MOUTH TWICE DAILY 60 tablet 5    traZODone (DESYREL) 100 MG tablet Take 100 mg by mouth nightly      famotidine (PEPCID) 40 MG tablet Take 40 mg by mouth as needed      Acetaminophen-Codeine (TYLENOL WITH CODEINE #3 PO) Take by mouth daily      HYDRALAZINE HCL PO Take by mouth as needed      montelukast (SINGULAIR) 10 MG tablet Take 1 tablet by mouth in the morning.  30 tablet 3    zolpidem (AMBIEN) 10 MG tablet TAKE 1 TABLET BY MOUTH EVERY NIGHT AS NEEDED FOR SLEEP 30 tablet 5    ELIQUIS 5 MG TABS tablet TAKE 1 TABLET BY MOUTH TWICE DAILY 180 tablet 3    estrogens, conjugated, (PREMARIN) 0.625 MG tablet Take 0.625 mg by mouth daily      azelastine (ASTELIN) 0.1 % nasal spray 1 spray by Nasal route 2 times daily Use in each nostril as directed 60 mL 1    albuterol sulfate  (90 Base) MCG/ACT inhaler INHALE 2 PUFFS EVERY 4 HOURS WHILE AWAKE FOR 7-10 DAYS, THEN EVERY 6 HOURS AS NEEDED FOR WHEEZING 6.7 g 1    fluticasone-salmeterol (ADVAIR HFA) 115-21 MCG/ACT inhaler Inhale 2 puffs into the lungs daily 1 each 2    dicyclomine (BENTYL) 20 MG tablet Take 20 mg by mouth 4 times daily as needed      flecainide (TAMBOCOR) 50 MG tablet Take 1 tablet by mouth 2 times daily 180 tablet 3    predniSONE (DELTASONE) 1 MG tablet Take 4 mg by mouth daily       metoprolol tartrate (LOPRESSOR) 25 MG tablet TAKE 2 TABLETS(50 MG) BY MOUTH TWICE DAILY 360 tablet 3    leflunomide (ARAVA) 20 MG tablet Take 20 mg by mouth daily          Social History: Hasn't worked since 2019. 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 Physical Examination   No acute distress. Mood clear/affect appropriate. Alert and oriented. Mucous membranes moist.  Sclera anicteric. Full body skin exam was conducted to include the scalp, face, lips/teeth, lids/conjunctiva, ears, neck, chest, abdomen, back, /buttock, right and left hands and forearms, right and left leg and feet and was normal with the following exceptions:   -Decreased hair density throughout scalp with many small hairs growing and + pull test. No erythema of follicles or scale on scalp today  -All fingernails and toenails WNL except yellowing of L great toenail with subungual debris  -Face clear of rash/peeling today  - On trunk and bilateral upper and lower extremities, there are multiple well-circumscribed, homogenous tan to brown macules and papules   - Multiple tan to light brown macules on face, shoulders and arms in a photo-distributed pattern  -Scattered, brown, stuck-on appearing, verrucous papules on trunk and extremities    Assessment and Plan     1. Rash and other nonspecific skin eruption, face--not active today  -Ddx includes ACD vs. Component of seb derm (hx scaling in eyebrows)  -Rec switching to bland skin care products (stop facewash with beads)  -Take photo when active  -Treat seb derm below on eyebrows with keto shampoo     2. Onychomycosis, L great toenail--not controlled  -Reviewed SE of oral meds and interaction of terbinafine with metoprolol  -Rec starting:  - ciclopirox (PENLAC) 8 % solution; Apply to affected left great toenail nightly Monday-Saturday. Wipe off with alcohol pad on Sunday  Dispense: 3.3 mL; Refill: 4    3.  Multiple benign melanocytic nevi of upper extremity, lower extremity, and trunk  - Benign, reassurance  - Counseled on importance of daily sun protection (Broad spectrum, SPF >30), monthly self skin exams  - Reviewed ABCDEs of melanoma  - Sun screen hand out provided    4. Lentigines  -Benign, reassurance   - Reviewed relationship with sun exposure  - Rec daily sunscreen with SPF 30, broad spectrum      5. Other seborrheic keratosis  -Benign, reassurance     6. Non-scarring alopecia, scalp--not controlled  - Favor telogen eflluvium (2/2 recent illnesses and hospitalizations) + FPHL vs. Less likely other autoimmune cause   -Reviewed etiology, prognosis of TE and that this component should improve over next several months  -Start keto shampoo 2-3 times per week for scalp and eyebrow region  -Can start OTC minoxidil 5% as directed if desired (can cause irritation)     Return in about 4 months (around 2/3/2023). Note is transcribed using voice recognition software. Inadvertent computerized transcription errors may be present.     Xavi Obregon MD

## 2022-10-03 NOTE — PATIENT INSTRUCTIONS
Thank you for visiting [address] [facility] today! Please follow the instructions below as we discussed in clinic:      The white bump on your nose is a benign milia. Start applying over the counter retinol (Jeremias)    I think your hair loss is most likely telogen effluvium.   -Start washing scalp with ketoconazole shampoo 2-3 times per week. Leave it on wet scalp for 3-5 min before washing off  -Start minoxidil 5%  from the store if you desire    For toenail fungus, start applying Penlac for left toenail    BLAND SKIN CARE LIST    Inflammatory skin diseases can flare and become irritated due to fragrances and preservatives found in personal care products. Please try to switch your personal care products to ones found on the list below. These products are found online ([address]) and often at [facility] or [place]. Shampoos: Free and Clear Shampoo, [employer] Hypoallergenics Essence [employer] Wash Hair + Body \"Big Softie\" Shampoo  Lotions: Aveeno Eczema Therapy Daily Moisturizing Cream, Cerave Moisturizing Cream, Cetaphil Hydrating Lotion, Vaseline   Laundry Detergent:  All Free Clear Detergent Powder, Tide Free & Gentle Powdered Detergent, 7th Generation Laundry Detergent  Face wash: Cerave Hydrating Facial Cleanser, Cetaphil Gentle Skin Cleanser,   Facial moisturizers: Aveeno Daily Moisturizing Lotion with Sunscreen SPF 15, Cetaphil Daily Facial Moisturizer, Cerave AM Facial Moisturizing Lotion  Sunscreen: Vanicream sunscreen, La Roche-Posay Toleriane Double Repair Face Moisturizer SPF 30  Deodorant: Vanicream deodorant, Crystal body deodorant stick  Make-up: Bare Minerals

## 2022-10-19 NOTE — TELEPHONE ENCOUNTER
Refill request for Loratadine, spoke to pt and she is asking for a rx for systane eye drop also, pt states her eye itch and this helps.  Please advise

## 2022-10-20 RX ORDER — FLUTICASONE PROPIONATE AND SALMETEROL XINAFOATE 115; 21 UG/1; UG/1
AEROSOL, METERED RESPIRATORY (INHALATION)
Qty: 12 G | Refills: 5 | Status: SHIPPED | OUTPATIENT
Start: 2022-10-20

## 2022-10-20 NOTE — TELEPHONE ENCOUNTER
Requested Prescriptions     Pending Prescriptions Disp Refills    metoprolol tartrate (LOPRESSOR) 25 MG tablet [Pharmacy Med Name: METOPROLOL TARTRATE 25MG TABLETS] 360 tablet 3     Sig: TAKE 2 TABLETS(50 MG) BY MOUTH TWICE DAILY            Last Office Visit: 3/25/2021     Next Office Visit: 12.19. .2022

## 2022-10-21 RX ORDER — FLUTICASONE PROPIONATE AND SALMETEROL XINAFOATE 115; 21 UG/1; UG/1
AEROSOL, METERED RESPIRATORY (INHALATION)
Qty: 12 G | Refills: 5 | OUTPATIENT
Start: 2022-10-21

## 2022-10-21 RX ORDER — LORATADINE 10 MG/1
10 TABLET ORAL DAILY
Qty: 30 TABLET | Refills: 3 | Status: SHIPPED | OUTPATIENT
Start: 2022-10-21

## 2022-11-02 DIAGNOSIS — F41.9 ANXIETY: ICD-10-CM

## 2022-11-02 RX ORDER — ALPRAZOLAM 2 MG/1
TABLET ORAL
Qty: 90 TABLET | Refills: 0 | Status: SHIPPED | OUTPATIENT
Start: 2022-11-02 | End: 2022-12-02

## 2022-11-18 RX ORDER — MONTELUKAST SODIUM 10 MG/1
10 TABLET ORAL DAILY
Qty: 30 TABLET | Refills: 3 | Status: SHIPPED | OUTPATIENT
Start: 2022-11-18

## 2022-11-18 RX ORDER — AZELASTINE 1 MG/ML
SPRAY, METERED NASAL
Qty: 60 ML | Refills: 1 | Status: SHIPPED | OUTPATIENT
Start: 2022-11-18

## 2022-11-18 NOTE — TELEPHONE ENCOUNTER
Refill Request:     Last Office Visit:  5/26/2022     Next Scheduled Visit : Visit date not found     Medication Requested: Astelin and Singulair     Pharmacy:    [place] [phone]  Phone: [phone] Fax: [phone]

## 2022-12-05 DIAGNOSIS — F41.9 ANXIETY: ICD-10-CM

## 2022-12-05 RX ORDER — ALPRAZOLAM 2 MG/1
TABLET ORAL
Qty: 90 TABLET | Refills: 0 | Status: SHIPPED | OUTPATIENT
Start: 2022-12-05 | End: 2023-01-05

## 2022-12-19 ENCOUNTER — OFFICE VISIT (OUTPATIENT)
Dept: CARDIOLOGY CLINIC | Age: 57
End: 2022-12-19
Payer: OTHER GOVERNMENT

## 2022-12-19 VITALS
HEART RATE: 76 BPM | BODY MASS INDEX: 25.37 KG/M2 | OXYGEN SATURATION: 96 % | DIASTOLIC BLOOD PRESSURE: 80 MMHG | WEIGHT: 171.8 LBS | SYSTOLIC BLOOD PRESSURE: 124 MMHG

## 2022-12-19 DIAGNOSIS — I48.0 PAROXYSMAL ATRIAL FIBRILLATION (HCC): Primary | ICD-10-CM

## 2022-12-19 DIAGNOSIS — I10 ESSENTIAL HYPERTENSION: ICD-10-CM

## 2022-12-19 DIAGNOSIS — R94.31 ABNORMAL EKG: ICD-10-CM

## 2022-12-19 PROCEDURE — 3017F COLORECTAL CA SCREEN DOC REV: CPT | Performed by: INTERNAL MEDICINE

## 2022-12-19 PROCEDURE — 3074F SYST BP LT 130 MM HG: CPT | Performed by: INTERNAL MEDICINE

## 2022-12-19 PROCEDURE — G8427 DOCREV CUR MEDS BY ELIG CLIN: HCPCS | Performed by: INTERNAL MEDICINE

## 2022-12-19 PROCEDURE — 3078F DIAST BP <80 MM HG: CPT | Performed by: INTERNAL MEDICINE

## 2022-12-19 PROCEDURE — G8484 FLU IMMUNIZE NO ADMIN: HCPCS | Performed by: INTERNAL MEDICINE

## 2022-12-19 PROCEDURE — 99214 OFFICE O/P EST MOD 30 MIN: CPT | Performed by: INTERNAL MEDICINE

## 2022-12-19 PROCEDURE — G8417 CALC BMI ABV UP PARAM F/U: HCPCS | Performed by: INTERNAL MEDICINE

## 2022-12-19 PROCEDURE — 1036F TOBACCO NON-USER: CPT | Performed by: INTERNAL MEDICINE

## 2022-12-19 RX ORDER — POLYETHYLENE GLYCOL 400 AND PROPYLENE GLYCOL 4; 3 MG/ML; MG/ML
1 SOLUTION/ DROPS OPHTHALMIC PRN
COMMUNITY

## 2022-12-19 ASSESSMENT — ENCOUNTER SYMPTOMS
CHOKING: 0
CHEST TIGHTNESS: 0
SHORTNESS OF BREATH: 0
COUGH: 0

## 2022-12-19 NOTE — PROGRESS NOTES
Subjective:      Patient ID: Saumya Blanco is a 61 y.o. female. Here for follow up afib/htn/abn ekg. Reviewed hosp for HTN urg/GIB/Vag bleed. Down from neuropathy/RA. Still neuro eval.  Fatigue. Not real active. No exertional.  No syncope. No pnd or orthopnea. Breathing stable. Rhythm stable. bp reasonable. Past Medical History:   Diagnosis Date    Alcohol abuse, in remission     Anxiety state, unspecified     COVID     Depressive disorder, not elsewhere classified     Diffuse cystic mastopathy     Extrinsic asthma, unspecified     Hypertension     Leiomyoma of uterus, unspecified     Other and unspecified ovarian cyst      Past Surgical History:   Procedure Laterality Date    CHOLECYSTECTOMY      HYSTERECTOMY (CERVIX STATUS UNKNOWN)       Social History     Socioeconomic History    Marital status:      Spouse name: Not on file    Number of children: Not on file    Years of education: Not on file    Highest education level: Not on file   Occupational History    Not on file   Tobacco Use    Smoking status: Never    Smokeless tobacco: Never   Substance and Sexual Activity    Alcohol use: Yes     Alcohol/week: 1.0 - 2.0 standard drink     Types: 1 - 2 Glasses of wine per week     Comment: 2 drinks qd    Drug use: No    Sexual activity: Yes     Partners: Male   Other Topics Concern    Not on file   Social History Narrative    Not on file     Social Determinants of Health     Financial Resource Strain: Low Risk     Difficulty of Paying Living Expenses: Not hard at all   Food Insecurity: No Food Insecurity    Worried About Running Out of Food in the Last Year: Never true    Ran Out of Food in the Last Year: Never true   Transportation Needs: Not on file   Physical Activity: Not on file   Stress: Not on file   Social Connections: Not on file   Intimate Partner Violence: Not on file   Housing Stability: Not on file       FH reviewed, denies FH cardiac issues.       Vitals:    12/19/22 1420   BP: 124/80   Pulse: 76   SpO2: 96%         Wt 174    Review of Systems   Constitutional:  Negative for activity change, appetite change and fatigue. Respiratory:  Negative for cough, choking, chest tightness and shortness of breath. Cardiovascular:  Negative for chest pain, palpitations and leg swelling. Denies PND or orthopnea. No tachycardia or syncope. Neurological:  Negative for dizziness, syncope and light-headedness. Psychiatric/Behavioral:  Negative for agitation, behavioral problems and confusion. All other systems reviewed negative as done. Objective:   Physical Exam  Constitutional:       General: She is not in acute distress. Appearance: She is well-developed. HENT:      Head: Normocephalic and atraumatic. Eyes:      General:         Right eye: No discharge. Left eye: No discharge. Conjunctiva/sclera: Conjunctivae normal.   Neck:      Vascular: No JVD. Cardiovascular:      Rate and Rhythm: Normal rate and regular rhythm. Pulses:           Radial pulses are 2+ on the right side and 2+ on the left side. Heart sounds: Normal heart sounds, S1 normal and S2 normal. No murmur heard. No gallop. Pulmonary:      Effort: Pulmonary effort is normal. No respiratory distress. Breath sounds: Normal breath sounds. No wheezing or rales. Abdominal:      General: Bowel sounds are normal.      Palpations: Abdomen is soft. Tenderness: There is no abdominal tenderness. Musculoskeletal:         General: No tenderness. Normal range of motion. Cervical back: Normal range of motion. Skin:     General: Skin is warm and dry. Neurological:      Mental Status: She is alert and oriented to person, place, and time. Psychiatric:         Behavior: Behavior normal.         Thought Content: Thought content normal.       Assessment:       Diagnosis Orders   1. Paroxysmal atrial fibrillation (HCC)        2. Essential hypertension        3.  Abnormal EKG Plan:      CV stable. Rhythm stable. Continue lopressor 50 mg bid for HTN/afib. BP good. Will continue Eliquis 5 mg bid for CVA proph/afib. No bleeding issues. Will continue lopressor 50 mg bid/flecanide 50 mg bid for afib. Has been told about watchman. Will watch for now. Reviewed outside records 10/17 at [facility]. (Head CT negative. CTPA negative for PE. Echo showing normal LV function mild TR with RVSP 36. Myoview negative for ischemia by scan.) GI/ Liver issues resolved. Follow up 3 months.

## 2022-12-22 ENCOUNTER — OFFICE VISIT (OUTPATIENT)
Dept: INTERNAL MEDICINE CLINIC | Age: 57
End: 2022-12-22
Payer: OTHER GOVERNMENT

## 2022-12-22 VITALS
DIASTOLIC BLOOD PRESSURE: 76 MMHG | HEIGHT: 69 IN | TEMPERATURE: 97 F | HEART RATE: 67 BPM | SYSTOLIC BLOOD PRESSURE: 118 MMHG | WEIGHT: 169 LBS | BODY MASS INDEX: 25.03 KG/M2 | OXYGEN SATURATION: 97 %

## 2022-12-22 DIAGNOSIS — J45.20 INTERMITTENT ASTHMA WITHOUT COMPLICATION, UNSPECIFIED ASTHMA SEVERITY: Primary | ICD-10-CM

## 2022-12-22 DIAGNOSIS — F41.9 ANXIETY: ICD-10-CM

## 2022-12-22 DIAGNOSIS — F51.04 CHRONIC INSOMNIA: ICD-10-CM

## 2022-12-22 PROCEDURE — 3078F DIAST BP <80 MM HG: CPT | Performed by: HOSPITALIST

## 2022-12-22 PROCEDURE — 3017F COLORECTAL CA SCREEN DOC REV: CPT | Performed by: HOSPITALIST

## 2022-12-22 PROCEDURE — 1036F TOBACCO NON-USER: CPT | Performed by: HOSPITALIST

## 2022-12-22 PROCEDURE — 99214 OFFICE O/P EST MOD 30 MIN: CPT | Performed by: HOSPITALIST

## 2022-12-22 PROCEDURE — 3074F SYST BP LT 130 MM HG: CPT | Performed by: HOSPITALIST

## 2022-12-22 PROCEDURE — G8427 DOCREV CUR MEDS BY ELIG CLIN: HCPCS | Performed by: HOSPITALIST

## 2022-12-22 PROCEDURE — G8420 CALC BMI NORM PARAMETERS: HCPCS | Performed by: HOSPITALIST

## 2022-12-22 PROCEDURE — G8484 FLU IMMUNIZE NO ADMIN: HCPCS | Performed by: HOSPITALIST

## 2022-12-22 RX ORDER — ALPRAZOLAM 2 MG/1
TABLET ORAL
Qty: 90 TABLET | Refills: 0 | Status: SHIPPED | OUTPATIENT
Start: 2022-12-22 | End: 2023-01-22

## 2022-12-22 RX ORDER — ZOLPIDEM TARTRATE 10 MG/1
TABLET ORAL
Qty: 30 TABLET | Refills: 5 | Status: SHIPPED | OUTPATIENT
Start: 2022-12-22 | End: 2023-12-22

## 2022-12-22 RX ORDER — ALBUTEROL SULFATE 90 UG/1
AEROSOL, METERED RESPIRATORY (INHALATION)
Qty: 6.7 G | Refills: 1 | Status: SHIPPED | OUTPATIENT
Start: 2022-12-22

## 2022-12-22 ASSESSMENT — ENCOUNTER SYMPTOMS
SINUS PRESSURE: 0
BLOOD IN STOOL: 0
COUGH: 0
VOMITING: 0
TROUBLE SWALLOWING: 0
SHORTNESS OF BREATH: 0
SINUS PAIN: 0
CHEST TIGHTNESS: 0
DIARRHEA: 0
SORE THROAT: 0
BACK PAIN: 0
NAUSEA: 0
VOICE CHANGE: 0
ABDOMINAL PAIN: 0
WHEEZING: 0
CONSTIPATION: 0
ABDOMINAL DISTENTION: 0

## 2022-12-22 NOTE — PROGRESS NOTES
[facility] Medicine  Follow-up visit   2022    Griselda Goring (:  1965) is a 61 y.o. female, here for follow-up:    Chief Complaint   Patient presents with    Medication Check    Other     Neuroscience     Headache     Sharp head pain        HPI    The patient a 61year old female with RA. She  recently seen Dr. Javier Howell for various neurologic sx including memory issues, dysequilibrium. She has RA and has severe fatigue. Patient says she has had some imaging done, but I do not have any access to the results because they are not in care everywhere or scanned in media. -RA:  Diagnosed greater thirty years ago. Currently off [address] She is on Methotrexate and prednisone. Sees Dr. Caity Mcgrath once monthly. -Afib: Sees Dr. Bebeto Loera. Well controlled on lopressor/flecanide. Eliquis for CVA prophylaxis.       -Hx of MDD/PTSD:  Father  in . Nasty divorce . Then there was a stalker. Father committed suicide. Son is retired  and has PTSD. She has night terrors. She is on trazadone 100, ambien 10, prn Xanex.         - 21 day hospitalization for COVID-19.       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       -Hasn't worked since 2019. 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 ROS  Review of Systems   Constitutional:  Positive for fatigue. Negative for activity change, appetite change, chills, diaphoresis, fever and unexpected weight change. HENT:  Negative for sinus pressure, sinus pain, sore throat, trouble swallowing and voice change. Eyes:  Negative for visual disturbance. Respiratory:  Negative for cough, chest tightness, shortness of breath and wheezing. Cardiovascular:  Negative for chest pain, palpitations and leg swelling.    Gastrointestinal:  Negative for abdominal distention, abdominal pain, blood in stool, constipation, diarrhea, nausea and vomiting. Endocrine: Negative for polydipsia and polyphagia. Genitourinary:  Negative for decreased urine volume, difficulty urinating, dysuria and urgency. Musculoskeletal:  Positive for myalgias. Negative for back pain, gait problem and joint swelling. Neurological:  Negative for dizziness, seizures, syncope, light-headedness and headaches. Dysequilibrium, memory   Psychiatric/Behavioral:  Positive for confusion, decreased concentration and dysphoric mood. Negative for agitation, behavioral problems and suicidal ideas. HISTORIES  Current Outpatient Medications on File Prior to Visit   Medication Sig Dispense Refill    methotrexate (RHEUMATREX) 2.5 MG chemo tablet Take by mouth once a week Take 4 tablets on Saturdays only      polyethyl glycol-propyl glycol 0.4-0.3 % (SYSTANE) 0.4-0.3 % ophthalmic solution 1 drop as needed for Dry Eyes      azelastine (ASTELIN) 0.1 % nasal spray USE 1 SPRAY IN EACH NOSTRIL TWICE DAILY AS DIRECTED 60 mL 1    montelukast (SINGULAIR) 10 MG tablet TAKE 1 TABLET BY MOUTH IN THE MORNING 30 tablet 3    loratadine (CLARITIN) 10 MG tablet TAKE 1 TABLET BY MOUTH DAILY 30 tablet 3    metoprolol tartrate (LOPRESSOR) 25 MG tablet TAKE 2 TABLETS(50 MG) BY MOUTH TWICE DAILY 360 tablet 3    ciclopirox (PENLAC) 8 % solution Apply to affected left great toenail nightly Monday-Saturday. Wipe off with alcohol pad on Sunday 3.3 mL 4    ketoconazole (NIZORAL) 2 % shampoo Use to wash the scalp 3 times weekly. Leave on the scalp for 5 minutes prior to rinsing.  120 mL 3    potassium chloride (KLOR-CON M) 20 MEQ extended release tablet TAKE 1 TABLET BY MOUTH TWICE DAILY 60 tablet 5    traZODone (DESYREL) 100 MG tablet Take 100 mg by mouth nightly      famotidine (PEPCID) 40 MG tablet Take 40 mg by mouth as needed      Acetaminophen-Codeine (TYLENOL WITH CODEINE #3 PO) Take by mouth daily      HYDRALAZINE HCL PO Take by mouth as needed      ELIQUIS 5 MG TABS tablet TAKE 1 TABLET BY MOUTH TWICE DAILY 180 tablet 3    estrogens, conjugated, (PREMARIN) 0.625 MG tablet Take 0.625 mg by mouth daily      dicyclomine (BENTYL) 20 MG tablet Take 20 mg by mouth 4 times daily as needed      flecainide (TAMBOCOR) 50 MG tablet Take 1 tablet by mouth 2 times daily 180 tablet 3    predniSONE (DELTASONE) 1 MG tablet Take 4 mg by mouth daily       leflunomide (ARAVA) 20 MG tablet Take 20 mg by mouth daily  (Patient not taking: No sig reported)       No current facility-administered medications on file prior to visit. Allergies   Allergen Reactions    Amlodipine Other (See Comments)     edema    Bystolic [Nebivolol Hcl] Other (See Comments)     Hair falling out    Demerol Hives    Dilaudid [Hydromorphone Hcl] Hives    Losartan Other (See Comments)     Possible arthralgias    Penicillins     Spironolactone Other (See Comments) and Hives     ? Sulfa Antibiotics     Iodides Nausea And Vomiting     Radioactive dye, sever h/a high b/p     Past Medical History:   Diagnosis Date    Alcohol abuse, in remission     Anxiety state, unspecified     COVID     Depressive disorder, not elsewhere classified     Diffuse cystic mastopathy     Extrinsic asthma, unspecified     Hypertension     Leiomyoma of uterus, unspecified     Other and unspecified ovarian cyst      Patient Active Problem List   Diagnosis    Asthma    Diffuse cystic mastopathy    Gestational diabetes    Essential hypertension    Chronic insomnia    Alcohol abuse    Adjustment disorder with anxiety    Menopausal symptoms    GERD (gastroesophageal reflux disease)    Overweight (BMI 25.0-29. 9)    Obstructive sleep apnea syndrome    Paroxysmal atrial fibrillation (HCC)    Environmental allergies    Other fatigue    Steatohepatitis    Attention deficit disorder (ADD)    Cognitive impairment    Dry skin dermatitis    Gastroesophageal reflux disease without esophagitis    Primary insomnia    Rheumatoid arthritis of multiple sites with negative rheumatoid factor (HCC)     Past Surgical History:   Procedure Laterality Date    CHOLECYSTECTOMY      HYSTERECTOMY (CERVIX STATUS UNKNOWN)       Social History     Socioeconomic History    Marital status:      Spouse name: Not on file    Number of children: Not on file    Years of education: Not on file    Highest education level: Not on file   Occupational History    Not on file   Tobacco Use    Smoking status: Never    Smokeless tobacco: Never   Substance and Sexual Activity    Alcohol use: Yes     Alcohol/week: 1.0 - 2.0 standard drink     Types: 1 - 2 Glasses of wine per week     Comment: 2 drinks qd    Drug use: No    Sexual activity: Yes     Partners: Male   Other Topics Concern    Not on file   Social History Narrative    Not on file     Social Determinants of Health     Financial Resource Strain: Low Risk     Difficulty of Paying Living Expenses: Not hard at all   Food Insecurity: No Food Insecurity    Worried About Running Out of Food in the Last Year: Never true    Ran Out of Food in the Last Year: Never true   Transportation Needs: Not on file   Physical Activity: Not on file   Stress: Not on file   Social Connections: Not on file   Intimate Partner Violence: Not on file   Housing Stability: Not on file      Family History   Problem Relation Age of Onset    Breast Cancer Neg Hx     Ovarian Cancer Neg Hx        PE  Vitals:    12/22/22 1431   BP: 118/76   Site: Right Upper Arm   Position: Sitting   Pulse: 67   Temp: 97 °F (36.1 °C)   SpO2: 97%   Weight: 169 lb (76.7 kg)   Height: 5' 9\" (1.753 m)     Estimated body mass index is 24.96 kg/m² as calculated from the following:    Height as of this encounter: 5' 9\" (1.753 m). Weight as of this encounter: 169 lb (76.7 kg). Physical Exam  Vitals reviewed. Constitutional:       General: She is not in acute distress. Appearance: Normal appearance. HENT:      Head: Normocephalic and atraumatic.       Mouth/Throat:      Pharynx: Oropharynx is clear.   Eyes:      Conjunctiva/sclera: Conjunctivae normal.      Pupils: Pupils are equal, round, and reactive to light. Cardiovascular:      Rate and Rhythm: Normal rate and regular rhythm. Pulses: Normal pulses. Heart sounds: Normal heart sounds. Pulmonary:      Effort: Pulmonary effort is normal. No respiratory distress. Breath sounds: Normal breath sounds. No wheezing or rales. Abdominal:      Palpations: Abdomen is soft. Tenderness: There is no abdominal tenderness. There is no rebound. Musculoskeletal:         General: No signs of injury. Normal range of motion. Cervical back: Normal range of motion and neck supple. Skin:     General: Skin is warm and dry. Coloration: Skin is not jaundiced. Neurological:      General: No focal deficit present. Mental Status: She is alert and oriented to person, place, and time. Psychiatric:         Behavior: Behavior normal.         Thought Content: Thought content normal.         Judgment: Judgment normal.       ASSESSMENT/ PLAN:  1. Anxiety  - ALPRAZolam (XANAX) 2 MG tablet; TAKE 1/2 TO 1 TABLET BY MOUTH THREE TIMES DAILY AS NEEDED FOR ANXIETY  Dispense: 90 tablet; Refill: 0    2. Chronic insomnia  -Trazadone and Ambien.    - zolpidem (AMBIEN) 10 MG tablet; TAKE 1 TABLET BY MOUTH EVERY NIGHT AS NEEDED FOR SLEEP  Dispense: 30 tablet; Refill: 5    3. Intermittent asthma without complication, unspecified asthma severity  -Gave trillogy samples. - fluticasone-salmeterol (ADVAIR HFA) 115-21 MCG/ACT inhaler; INHALE 2 PUFFS INTO THE LUNGS DAILY  Dispense: 12 g; Refill: 5  - albuterol sulfate HFA (PROVENTIL;VENTOLIN;PROAIR) 108 (90 Base) MCG/ACT inhaler; INHALE 2 PUFFS EVERY 4 HOURS WHILE AWAKE FOR 7-10 DAYS, THEN EVERY 6 HOURS AS NEEDED FOR WHEEZING  Dispense: 6.7 g; Refill: 1     No orders of the defined types were placed in this encounter.      Orders Placed This Encounter   Medications    ALPRAZolam (XANAX) 2 MG tablet     Sig: TAKE 1/2 TO 1 TABLET BY MOUTH THREE TIMES DAILY AS NEEDED FOR ANXIETY     Dispense:  90 tablet     Refill:  0     OK TO FILL 1/3/2023.    zolpidem (AMBIEN) 10 MG tablet     Sig: TAKE 1 TABLET BY MOUTH EVERY NIGHT AS NEEDED FOR SLEEP     Dispense:  30 tablet     Refill:  5    fluticasone-salmeterol (ADVAIR HFA) 115-21 MCG/ACT inhaler     Sig: INHALE 2 PUFFS INTO THE LUNGS DAILY     Dispense:  12 g     Refill:  5    albuterol sulfate HFA (PROVENTIL;VENTOLIN;PROAIR) 108 (90 Base) MCG/ACT inhaler     Sig: INHALE 2 PUFFS EVERY 4 HOURS WHILE AWAKE FOR 7-10 DAYS, THEN EVERY 6 HOURS AS NEEDED FOR WHEEZING     Dispense:  6.7 g     Refill:  1      Medications Discontinued During This Encounter   Medication Reason    albuterol sulfate  (90 Base) MCG/ACT inhaler REORDER    zolpidem (AMBIEN) 10 MG tablet REORDER    ADVAIR -21 MCG/ACT inhaler REORDER    ALPRAZolam (XANAX) 2 MG tablet REORDER        Return in about 3 months (around 3/22/2023) for Chronic Conditions. Evens Terrell MD    This dictation was generated by voice recognition computer software. Although all attempts are made to edit the dictation for accuracy, there may be errors in the transcription that are not intended.

## 2023-01-04 ENCOUNTER — TELEPHONE (OUTPATIENT)
Dept: CARDIOLOGY CLINIC | Age: 58
End: 2023-01-04

## 2023-01-04 NOTE — TELEPHONE ENCOUNTER
Pt will be having a Spinal Tap on 01/11/23 at [facility]. Pt needs to know how many day prior to procedure should she stop her Eliquis. Please advise.

## 2023-01-05 NOTE — TELEPHONE ENCOUNTER
Per F.W and Eliquis recommendations she can stop Eliquis 48 prior to procedure. Letter completed and faxed.

## 2023-01-06 RX ORDER — FLECAINIDE ACETATE 50 MG/1
50 TABLET ORAL 2 TIMES DAILY
Qty: 180 TABLET | Refills: 3 | Status: SHIPPED | OUTPATIENT
Start: 2023-01-06

## 2023-01-06 NOTE — TELEPHONE ENCOUNTER
Requested Prescriptions     Pending Prescriptions Disp Refills    flecainide (TAMBOCOR) 50 MG tablet 180 tablet 3     Sig: Take 1 tablet by mouth 2 times daily            Last Office Visit: 12/19/22    Next Office Visit: 3/27/23

## 2023-02-02 DIAGNOSIS — F41.9 ANXIETY: ICD-10-CM

## 2023-02-02 RX ORDER — ALPRAZOLAM 2 MG/1
TABLET ORAL
Qty: 90 TABLET | Refills: 0 | Status: SHIPPED | OUTPATIENT
Start: 2023-02-02 | End: 2023-03-02

## 2023-02-06 ENCOUNTER — OFFICE VISIT (OUTPATIENT)
Dept: DERMATOLOGY | Age: 58
End: 2023-02-06
Payer: OTHER GOVERNMENT

## 2023-02-06 DIAGNOSIS — L65.8 OTHER SPECIFIED NONSCARRING HAIR LOSS: Primary | ICD-10-CM

## 2023-02-06 PROCEDURE — G8427 DOCREV CUR MEDS BY ELIG CLIN: HCPCS | Performed by: INTERNAL MEDICINE

## 2023-02-06 PROCEDURE — 3017F COLORECTAL CA SCREEN DOC REV: CPT | Performed by: INTERNAL MEDICINE

## 2023-02-06 PROCEDURE — G8420 CALC BMI NORM PARAMETERS: HCPCS | Performed by: INTERNAL MEDICINE

## 2023-02-06 PROCEDURE — G8484 FLU IMMUNIZE NO ADMIN: HCPCS | Performed by: INTERNAL MEDICINE

## 2023-02-06 PROCEDURE — 99213 OFFICE O/P EST LOW 20 MIN: CPT | Performed by: INTERNAL MEDICINE

## 2023-02-06 PROCEDURE — 1036F TOBACCO NON-USER: CPT | Performed by: INTERNAL MEDICINE

## 2023-02-06 NOTE — PROGRESS NOTES
[facility]  Jenise Britt MD  [phone]    Date of Visit: 2/6/2023  LV 10/2022    Colonel Barton is a 61 y.o. female who presents for skin lesions    Chief Complaint:   Chief Complaint   Patient presents with    Follow-up     4 month alopecia        History of Present Illness:    Concern: Alopecia c/w telogen eflluvium (2/2 recent illnesses and hospitalizations) + FPHL vs. Less likely other autoimmune cause   Location: All over scalp  Duration:  2022  Symptoms: A lot of hair loss in comb brush, having to wear a wig  -Current trmt:  -Ketoconazole shampoo BIW  -Topical minoxidil--didn't start this, forgot  Effect of trmt: Feels it's improving, small hairs coming in. Still wears wig     Patient was hospitalized after starting MTX for RA for side effects     *Other hx:  -Rash on face not seen clinically--could be seb derm vs. ACD  -Toenail fungus hx s/p loprox solution    *Personal history of skin cancer: AKs  *Family history of skin cancer: None    Review of Systems:  Gen: Feels well, good sense of health. Skin: No new or changing moles, no history of keloids or hypertrophic scars. Past Medical History, Family History, Surgical History, Medications and Allergies reviewed.     Past Medical History:   Diagnosis Date    Alcohol abuse, in remission     Anxiety state, unspecified     COVID     Depressive disorder, not elsewhere classified     Diffuse cystic mastopathy     Extrinsic asthma, unspecified     Hypertension     Leiomyoma of uterus, unspecified     Other and unspecified ovarian cyst      Past Surgical History:   Procedure Laterality Date    CHOLECYSTECTOMY      HYSTERECTOMY (CERVIX STATUS UNKNOWN)         Allergies   Allergen Reactions    Amlodipine Other (See Comments)     edema    Bystolic [Nebivolol Hcl] Other (See Comments)     Hair falling out    Demerol Hives    Dilaudid [Hydromorphone Hcl] Hives    Losartan Other (See Comments)     Possible arthralgias    Penicillins Spironolactone Other (See Comments) and Hives     ? Sulfa Antibiotics     Iodides Nausea And Vomiting     Radioactive dye, sever h/a high b/p     Outpatient Medications Marked as Taking for the 2/6/23 encounter (Office Visit) with Shana Iqbal MD   Medication Sig Dispense Refill    Ubrogepant (UBRELVY PO) Take by mouth      ALPRAZolam (XANAX) 2 MG tablet TAKE 1/2 TO 1 TABLET BY MOUTH THREE TIMES DAILY AS NEEDED FOR ANXIETY 90 tablet 0    flecainide (TAMBOCOR) 50 MG tablet Take 1 tablet by mouth 2 times daily 180 tablet 3    zolpidem (AMBIEN) 10 MG tablet TAKE 1 TABLET BY MOUTH EVERY NIGHT AS NEEDED FOR SLEEP 30 tablet 5    fluticasone-salmeterol (ADVAIR HFA) 115-21 MCG/ACT inhaler INHALE 2 PUFFS INTO THE LUNGS DAILY 12 g 5    albuterol sulfate HFA (PROVENTIL;VENTOLIN;PROAIR) 108 (90 Base) MCG/ACT inhaler INHALE 2 PUFFS EVERY 4 HOURS WHILE AWAKE FOR 7-10 DAYS, THEN EVERY 6 HOURS AS NEEDED FOR WHEEZING 6.7 g 1    azelastine (ASTELIN) 0.1 % nasal spray USE 1 SPRAY IN EACH NOSTRIL TWICE DAILY AS DIRECTED 60 mL 1    montelukast (SINGULAIR) 10 MG tablet TAKE 1 TABLET BY MOUTH IN THE MORNING 30 tablet 3    loratadine (CLARITIN) 10 MG tablet TAKE 1 TABLET BY MOUTH DAILY 30 tablet 3    metoprolol tartrate (LOPRESSOR) 25 MG tablet TAKE 2 TABLETS(50 MG) BY MOUTH TWICE DAILY 360 tablet 3    ketoconazole (NIZORAL) 2 % shampoo Use to wash the scalp 3 times weekly. Leave on the scalp for 5 minutes prior to rinsing.  120 mL 3    potassium chloride (KLOR-CON M) 20 MEQ extended release tablet TAKE 1 TABLET BY MOUTH TWICE DAILY 60 tablet 5    Acetaminophen-Codeine (TYLENOL WITH CODEINE #3 PO) Take by mouth daily      ELIQUIS 5 MG TABS tablet TAKE 1 TABLET BY MOUTH TWICE DAILY 180 tablet 3    estrogens, conjugated, (PREMARIN) 0.625 MG tablet Take 0.625 mg by mouth daily      dicyclomine (BENTYL) 20 MG tablet Take 20 mg by mouth 4 times daily as needed      predniSONE (DELTASONE) 1 MG tablet Take 4 mg by mouth daily leflunomide (ARAVA) 20 MG tablet Take 20 mg by mouth daily         Social History: Hasn't worked since 2019. 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 Physical Examination   No acute distress. Mood clear/affect appropriate. Alert and oriented. Mucous membranes moist.  Sclera anicteric. Visible skin exam was conducted to include the scalp, face, lips, lids/conjunctiva, ears, neck, right and left hands and forearms and was normal with the following exceptions:   -Decreased hair density throughout scalp with many small hairs growing and - pull test--primarily on parietal, frontal vertex scalp. No erythema of follicles or scale on scalp today      Assessment and Plan     1. Non-scarring alopecia, scalp--improved   - Favor telogen eflluvium (2/2 recent illnesses and hospitalizations) + FPHL vs. Less likely other autoimmune cause   -CBC, CMP, Iron panel and TSH from 2022 stable  -Reviewed etiology, prognosis of TE and that this component should improve over next several months--can be exacerbated by stressors, medication changes etc  -Continue keto shampoo 2-3 times per week for scalp and eyebrow region  -Start OTC minoxidil 5% as directed if desired (can cause irritation, paradoxical hair growth on face) --apply to small area on skin x5 days before applying to affected hair loss areas     RTC 4 mo    Note is transcribed using voice recognition software. Inadvertent computerized transcription errors may be present.     Sia Lacy MD

## 2023-02-06 NOTE — PATIENT INSTRUCTIONS
Thank you for visiting [facility] today! Please follow the instructions below as we discussed in clinic:      -Continue washing scalp with ketoconazole shampoo 2-3 times per week.  Leave it on wet scalp for 3-5 min before washing off  -You can start topical minoxidil 5% from the store if you desire

## 2023-02-23 NOTE — TELEPHONE ENCOUNTER
Refill request for loratadine, last refill on 10/21/22, last OV on  5/26/22, no apt scheduled, please advise

## 2023-02-28 RX ORDER — LORATADINE 10 MG/1
10 TABLET ORAL DAILY
Qty: 30 TABLET | Refills: 3 | Status: SHIPPED | OUTPATIENT
Start: 2023-02-28

## 2023-03-04 DIAGNOSIS — F41.9 ANXIETY: ICD-10-CM

## 2023-03-06 RX ORDER — ALPRAZOLAM 2 MG/1
TABLET ORAL
Qty: 90 TABLET | Refills: 0 | Status: SHIPPED | OUTPATIENT
Start: 2023-03-06 | End: 2023-04-06

## 2023-03-09 ENCOUNTER — TELEPHONE (OUTPATIENT)
Dept: INTERNAL MEDICINE CLINIC | Age: 58
End: 2023-03-09

## 2023-03-09 NOTE — TELEPHONE ENCOUNTER
Pt called in and asked to be seen soon so she can go over her MRI results with Dr. Va Gama. Please call and advise.

## 2023-03-10 NOTE — TELEPHONE ENCOUNTER
Patient had this done 4 months ago at [address] [place] [address] [address] Neuro. Patient has had multiple tests done that she would like to discuss. Pls call and advise.

## 2023-03-27 RX ORDER — POTASSIUM CHLORIDE 20 MEQ/1
TABLET, EXTENDED RELEASE ORAL
Qty: 60 TABLET | Refills: 5 | Status: SHIPPED | OUTPATIENT
Start: 2023-03-27

## 2023-03-27 RX ORDER — MONTELUKAST SODIUM 10 MG/1
10 TABLET ORAL DAILY
Qty: 30 TABLET | Refills: 3 | Status: SHIPPED | OUTPATIENT
Start: 2023-03-27

## 2023-03-27 NOTE — TELEPHONE ENCOUNTER
Refill for montelukast. Take 1 tablet by mouth every morning. 30 tablets with 3 refills. Last fill 02/23/2023. Please advise.

## 2023-04-04 ENCOUNTER — HOSPITAL ENCOUNTER (INPATIENT)
Age: 58
LOS: 3 days | Discharge: HOME | End: 2023-04-07
Attending: STUDENT IN AN ORGANIZED HEALTH CARE EDUCATION/TRAINING PROGRAM | Admitting: INTERNAL MEDICINE
Payer: OTHER GOVERNMENT

## 2023-04-04 ENCOUNTER — APPOINTMENT (OUTPATIENT)
Dept: CT IMAGING | Age: 58
End: 2023-04-04
Payer: OTHER GOVERNMENT

## 2023-04-04 DIAGNOSIS — A41.9 SEPTICEMIA (HCC): Primary | ICD-10-CM

## 2023-04-04 DIAGNOSIS — R19.7 DIARRHEA, UNSPECIFIED TYPE: ICD-10-CM

## 2023-04-04 DIAGNOSIS — D84.9 IMMUNOSUPPRESSION (HCC): ICD-10-CM

## 2023-04-04 DIAGNOSIS — R11.2 NAUSEA AND VOMITING, UNSPECIFIED VOMITING TYPE: ICD-10-CM

## 2023-04-04 PROBLEM — K52.9 ENTERITIS: Status: ACTIVE | Noted: 2023-04-04

## 2023-04-04 LAB
ALBUMIN SERPL-MCNC: 4.8 G/DL (ref 3.4–5)
ALP SERPL-CCNC: 66 U/L (ref 40–129)
ALT SERPL-CCNC: 24 U/L (ref 10–40)
ANION GAP SERPL CALCULATED.3IONS-SCNC: 24 MMOL/L (ref 3–16)
AST SERPL-CCNC: 26 U/L (ref 15–37)
BACTERIA URNS QL MICRO: ABNORMAL /HPF
BASE EXCESS BLDV CALC-SCNC: 2.7 MMOL/L (ref -3–3)
BASOPHILS # BLD: 0.1 K/UL (ref 0–0.2)
BASOPHILS NFR BLD: 0.5 %
BILIRUB DIRECT SERPL-MCNC: <0.2 MG/DL (ref 0–0.3)
BILIRUB INDIRECT SERPL-MCNC: ABNORMAL MG/DL (ref 0–1)
BILIRUB SERPL-MCNC: 0.7 MG/DL (ref 0–1)
BILIRUB UR QL STRIP.AUTO: NEGATIVE
BUN SERPL-MCNC: 17 MG/DL (ref 7–20)
C DIFF TOX A+B STL QL IA: NORMAL
CALCIUM SERPL-MCNC: 9.7 MG/DL (ref 8.3–10.6)
CHLORIDE SERPL-SCNC: 96 MMOL/L (ref 99–110)
CLARITY UR: CLEAR
CO2 BLDV-SCNC: 55 MMOL/L
CO2 SERPL-SCNC: 18 MMOL/L (ref 21–32)
COHGB MFR BLDV: 3.4 % (ref 0–1.5)
COLOR UR: YELLOW
CREAT SERPL-MCNC: 1.1 MG/DL (ref 0.6–1.1)
DEPRECATED RDW RBC AUTO: 14.4 % (ref 12.4–15.4)
EKG ATRIAL RATE: 91 BPM
EKG DIAGNOSIS: NORMAL
EKG P AXIS: 34 DEGREES
EKG P-R INTERVAL: 146 MS
EKG Q-T INTERVAL: 370 MS
EKG QRS DURATION: 62 MS
EKG QTC CALCULATION (BAZETT): 455 MS
EKG R AXIS: -16 DEGREES
EKG T AXIS: 33 DEGREES
EKG VENTRICULAR RATE: 91 BPM
EOSINOPHIL # BLD: 0 K/UL (ref 0–0.6)
EOSINOPHIL NFR BLD: 0.1 %
EPI CELLS #/AREA URNS AUTO: 2 /HPF (ref 0–5)
GFR SERPLBLD CREATININE-BSD FMLA CKD-EPI: 58 ML/MIN/{1.73_M2}
GLUCOSE BLD-MCNC: 103 MG/DL (ref 70–99)
GLUCOSE BLD-MCNC: 130 MG/DL (ref 70–99)
GLUCOSE SERPL-MCNC: 252 MG/DL (ref 70–99)
GLUCOSE UR STRIP.AUTO-MCNC: NEGATIVE MG/DL
HCO3 BLDV-SCNC: 23.7 MMOL/L (ref 23–29)
HCT VFR BLD AUTO: 50.6 % (ref 36–48)
HGB BLD-MCNC: 17.3 G/DL (ref 12–16)
HGB UR QL STRIP.AUTO: NEGATIVE
HYALINE CASTS #/AREA URNS AUTO: 14 /LPF (ref 0–8)
KETONES UR STRIP.AUTO-MCNC: 15 MG/DL
LACTATE BLDV-SCNC: 1.7 MMOL/L (ref 0.4–1.9)
LACTATE BLDV-SCNC: 2.7 MMOL/L (ref 0.4–1.9)
LACTATE BLDV-SCNC: 5 MMOL/L (ref 0.4–1.9)
LEUKOCYTE ESTERASE UR QL STRIP.AUTO: NEGATIVE
LIPASE SERPL-CCNC: 31 U/L (ref 13–60)
LYMPHOCYTES # BLD: 2.9 K/UL (ref 1–5.1)
LYMPHOCYTES NFR BLD: 17.9 %
MCH RBC QN AUTO: 32.5 PG (ref 26–34)
MCHC RBC AUTO-ENTMCNC: 34.2 G/DL (ref 31–36)
MCV RBC AUTO: 95.1 FL (ref 80–100)
METHGB MFR BLDV: 0.2 %
MONOCYTES # BLD: 0.9 K/UL (ref 0–1.3)
MONOCYTES NFR BLD: 5.6 %
NEUTROPHILS # BLD: 12.2 K/UL (ref 1.7–7.7)
NEUTROPHILS NFR BLD: 75.9 %
NITRITE UR QL STRIP.AUTO: NEGATIVE
O2 CT VFR BLDV CALC: 24 VOL %
O2 THERAPY: ABNORMAL
PCO2 BLDV: 27.7 MMHG (ref 40–50)
PERFORMED ON: ABNORMAL
PERFORMED ON: ABNORMAL
PH BLDV: 7.54 [PH] (ref 7.35–7.45)
PH UR STRIP.AUTO: 5.5 [PH] (ref 5–8)
PLATELET # BLD AUTO: 373 K/UL (ref 135–450)
PMV BLD AUTO: 7.5 FL (ref 5–10.5)
PO2 BLDV: 182 MMHG (ref 25–40)
POTASSIUM SERPL-SCNC: 4 MMOL/L (ref 3.5–5.1)
PROT SERPL-MCNC: 8.3 G/DL (ref 6.4–8.2)
PROT UR STRIP.AUTO-MCNC: 30 MG/DL
RBC # BLD AUTO: 5.33 M/UL (ref 4–5.2)
RBC CLUMPS #/AREA URNS AUTO: 1 /HPF (ref 0–4)
SAO2 % BLDV: 99 %
SODIUM SERPL-SCNC: 138 MMOL/L (ref 136–145)
SP GR UR STRIP.AUTO: 1.02 (ref 1–1.03)
UA COMPLETE W REFLEX CULTURE PNL UR: ABNORMAL
UA DIPSTICK W REFLEX MICRO PNL UR: YES
URN SPEC COLLECT METH UR: ABNORMAL
UROBILINOGEN UR STRIP-ACNC: 0.2 E.U./DL
WBC # BLD AUTO: 16.1 K/UL (ref 4–11)
WBC #/AREA URNS AUTO: 1 /HPF (ref 0–5)

## 2023-04-04 PROCEDURE — 94640 AIRWAY INHALATION TREATMENT: CPT

## 2023-04-04 PROCEDURE — 94761 N-INVAS EAR/PLS OXIMETRY MLT: CPT

## 2023-04-04 PROCEDURE — 96374 THER/PROPH/DIAG INJ IV PUSH: CPT

## 2023-04-04 PROCEDURE — 87324 CLOSTRIDIUM AG IA: CPT

## 2023-04-04 PROCEDURE — 6370000000 HC RX 637 (ALT 250 FOR IP): Performed by: INTERNAL MEDICINE

## 2023-04-04 PROCEDURE — 87040 BLOOD CULTURE FOR BACTERIA: CPT

## 2023-04-04 PROCEDURE — 87506 IADNA-DNA/RNA PROBE TQ 6-11: CPT

## 2023-04-04 PROCEDURE — 83605 ASSAY OF LACTIC ACID: CPT

## 2023-04-04 PROCEDURE — 2500000003 HC RX 250 WO HCPCS: Performed by: INTERNAL MEDICINE

## 2023-04-04 PROCEDURE — 96375 TX/PRO/DX INJ NEW DRUG ADDON: CPT

## 2023-04-04 PROCEDURE — 2580000003 HC RX 258: Performed by: INTERNAL MEDICINE

## 2023-04-04 PROCEDURE — 81001 URINALYSIS AUTO W/SCOPE: CPT

## 2023-04-04 PROCEDURE — 2500000003 HC RX 250 WO HCPCS: Performed by: STUDENT IN AN ORGANIZED HEALTH CARE EDUCATION/TRAINING PROGRAM

## 2023-04-04 PROCEDURE — 36415 COLL VENOUS BLD VENIPUNCTURE: CPT

## 2023-04-04 PROCEDURE — 93010 ELECTROCARDIOGRAM REPORT: CPT | Performed by: INTERNAL MEDICINE

## 2023-04-04 PROCEDURE — 1200000000 HC SEMI PRIVATE

## 2023-04-04 PROCEDURE — 80048 BASIC METABOLIC PNL TOTAL CA: CPT

## 2023-04-04 PROCEDURE — 87449 NOS EACH ORGANISM AG IA: CPT

## 2023-04-04 PROCEDURE — 6360000002 HC RX W HCPCS: Performed by: STUDENT IN AN ORGANIZED HEALTH CARE EDUCATION/TRAINING PROGRAM

## 2023-04-04 PROCEDURE — 2580000003 HC RX 258: Performed by: STUDENT IN AN ORGANIZED HEALTH CARE EDUCATION/TRAINING PROGRAM

## 2023-04-04 PROCEDURE — 80076 HEPATIC FUNCTION PANEL: CPT

## 2023-04-04 PROCEDURE — 99285 EMERGENCY DEPT VISIT HI MDM: CPT

## 2023-04-04 PROCEDURE — 6360000002 HC RX W HCPCS: Performed by: INTERNAL MEDICINE

## 2023-04-04 PROCEDURE — 82803 BLOOD GASES ANY COMBINATION: CPT

## 2023-04-04 PROCEDURE — 85025 COMPLETE CBC W/AUTO DIFF WBC: CPT

## 2023-04-04 PROCEDURE — 83690 ASSAY OF LIPASE: CPT

## 2023-04-04 PROCEDURE — 93005 ELECTROCARDIOGRAM TRACING: CPT | Performed by: STUDENT IN AN ORGANIZED HEALTH CARE EDUCATION/TRAINING PROGRAM

## 2023-04-04 PROCEDURE — C9113 INJ PANTOPRAZOLE SODIUM, VIA: HCPCS | Performed by: INTERNAL MEDICINE

## 2023-04-04 PROCEDURE — 74176 CT ABD & PELVIS W/O CONTRAST: CPT

## 2023-04-04 RX ORDER — TORSEMIDE 20 MG/1
20 TABLET ORAL DAILY PRN
COMMUNITY

## 2023-04-04 RX ORDER — ACETAMINOPHEN 650 MG/1
650 SUPPOSITORY RECTAL EVERY 6 HOURS PRN
Status: DISCONTINUED | OUTPATIENT
Start: 2023-04-04 | End: 2023-04-07 | Stop reason: HOSPADM

## 2023-04-04 RX ORDER — PREDNISONE 1 MG/1
4 TABLET ORAL DAILY
Status: DISCONTINUED | OUTPATIENT
Start: 2023-04-04 | End: 2023-04-07 | Stop reason: HOSPADM

## 2023-04-04 RX ORDER — SODIUM CHLORIDE 0.9 % (FLUSH) 0.9 %
5-40 SYRINGE (ML) INJECTION EVERY 12 HOURS SCHEDULED
Status: DISCONTINUED | OUTPATIENT
Start: 2023-04-04 | End: 2023-04-07 | Stop reason: HOSPADM

## 2023-04-04 RX ORDER — ALPRAZOLAM 0.5 MG/1
0.5 TABLET ORAL 3 TIMES DAILY PRN
Status: DISCONTINUED | OUTPATIENT
Start: 2023-04-04 | End: 2023-04-07 | Stop reason: HOSPADM

## 2023-04-04 RX ORDER — POTASSIUM CHLORIDE 20 MEQ/1
20 TABLET, EXTENDED RELEASE ORAL 2 TIMES DAILY WITH MEALS
Status: DISCONTINUED | OUTPATIENT
Start: 2023-04-04 | End: 2023-04-07 | Stop reason: HOSPADM

## 2023-04-04 RX ORDER — ZOLPIDEM TARTRATE 5 MG/1
5 TABLET ORAL NIGHTLY PRN
Status: DISCONTINUED | OUTPATIENT
Start: 2023-04-04 | End: 2023-04-07 | Stop reason: HOSPADM

## 2023-04-04 RX ORDER — METRONIDAZOLE 500 MG/100ML
500 INJECTION, SOLUTION INTRAVENOUS EVERY 8 HOURS
Status: DISCONTINUED | OUTPATIENT
Start: 2023-04-04 | End: 2023-04-07 | Stop reason: HOSPADM

## 2023-04-04 RX ORDER — SODIUM CHLORIDE 9 MG/ML
INJECTION, SOLUTION INTRAVENOUS PRN
Status: DISCONTINUED | OUTPATIENT
Start: 2023-04-04 | End: 2023-04-07 | Stop reason: HOSPADM

## 2023-04-04 RX ORDER — FLECAINIDE ACETATE 50 MG/1
50 TABLET ORAL 2 TIMES DAILY
Status: DISCONTINUED | OUTPATIENT
Start: 2023-04-04 | End: 2023-04-07 | Stop reason: HOSPADM

## 2023-04-04 RX ORDER — LACTOBACILLUS RHAMNOSUS GG 10B CELL
1 CAPSULE ORAL DAILY
Status: DISCONTINUED | OUTPATIENT
Start: 2023-04-04 | End: 2023-04-07 | Stop reason: HOSPADM

## 2023-04-04 RX ORDER — PANTOPRAZOLE SODIUM 40 MG/10ML
40 INJECTION, POWDER, LYOPHILIZED, FOR SOLUTION INTRAVENOUS DAILY
Status: DISCONTINUED | OUTPATIENT
Start: 2023-04-04 | End: 2023-04-07 | Stop reason: HOSPADM

## 2023-04-04 RX ORDER — HYDROCODONE BITARTRATE AND ACETAMINOPHEN 5; 325 MG/1; MG/1
1 TABLET ORAL EVERY 6 HOURS PRN
Status: DISCONTINUED | OUTPATIENT
Start: 2023-04-04 | End: 2023-04-07 | Stop reason: HOSPADM

## 2023-04-04 RX ORDER — ENOXAPARIN SODIUM 100 MG/ML
40 INJECTION SUBCUTANEOUS DAILY
Status: DISCONTINUED | OUTPATIENT
Start: 2023-04-04 | End: 2023-04-04 | Stop reason: ALTCHOICE

## 2023-04-04 RX ORDER — INSULIN LISPRO 100 [IU]/ML
0-4 INJECTION, SOLUTION INTRAVENOUS; SUBCUTANEOUS
Status: DISCONTINUED | OUTPATIENT
Start: 2023-04-04 | End: 2023-04-06

## 2023-04-04 RX ORDER — INSULIN LISPRO 100 [IU]/ML
0-4 INJECTION, SOLUTION INTRAVENOUS; SUBCUTANEOUS NIGHTLY
Status: DISCONTINUED | OUTPATIENT
Start: 2023-04-04 | End: 2023-04-06

## 2023-04-04 RX ORDER — LORAZEPAM 2 MG/ML
1 INJECTION INTRAMUSCULAR ONCE
Status: COMPLETED | OUTPATIENT
Start: 2023-04-04 | End: 2023-04-04

## 2023-04-04 RX ORDER — ALBUTEROL SULFATE 90 UG/1
2 AEROSOL, METERED RESPIRATORY (INHALATION) EVERY 6 HOURS PRN
Status: DISCONTINUED | OUTPATIENT
Start: 2023-04-04 | End: 2023-04-07 | Stop reason: HOSPADM

## 2023-04-04 RX ORDER — SODIUM CHLORIDE 0.9 % (FLUSH) 0.9 %
5-40 SYRINGE (ML) INJECTION PRN
Status: DISCONTINUED | OUTPATIENT
Start: 2023-04-04 | End: 2023-04-07 | Stop reason: HOSPADM

## 2023-04-04 RX ORDER — 0.9 % SODIUM CHLORIDE 0.9 %
30 INTRAVENOUS SOLUTION INTRAVENOUS ONCE
Status: COMPLETED | OUTPATIENT
Start: 2023-04-04 | End: 2023-04-04

## 2023-04-04 RX ORDER — DICYCLOMINE HYDROCHLORIDE 10 MG/1
20 CAPSULE ORAL 4 TIMES DAILY PRN
Status: DISCONTINUED | OUTPATIENT
Start: 2023-04-04 | End: 2023-04-07 | Stop reason: HOSPADM

## 2023-04-04 RX ORDER — LEFLUNOMIDE 20 MG/1
20 TABLET ORAL DAILY
Status: DISCONTINUED | OUTPATIENT
Start: 2023-04-04 | End: 2023-04-07 | Stop reason: HOSPADM

## 2023-04-04 RX ORDER — DEXTROSE MONOHYDRATE 100 MG/ML
INJECTION, SOLUTION INTRAVENOUS CONTINUOUS PRN
Status: DISCONTINUED | OUTPATIENT
Start: 2023-04-04 | End: 2023-04-07 | Stop reason: HOSPADM

## 2023-04-04 RX ORDER — OMEPRAZOLE 40 MG/1
40 CAPSULE, DELAYED RELEASE ORAL DAILY PRN
COMMUNITY

## 2023-04-04 RX ORDER — SODIUM CHLORIDE 9 MG/ML
INJECTION, SOLUTION INTRAVENOUS CONTINUOUS
Status: DISCONTINUED | OUTPATIENT
Start: 2023-04-04 | End: 2023-04-06

## 2023-04-04 RX ORDER — MORPHINE SULFATE 4 MG/ML
4 INJECTION, SOLUTION INTRAMUSCULAR; INTRAVENOUS ONCE
Status: COMPLETED | OUTPATIENT
Start: 2023-04-04 | End: 2023-04-04

## 2023-04-04 RX ORDER — METOPROLOL TARTRATE 50 MG/1
50 TABLET, FILM COATED ORAL 2 TIMES DAILY
Status: DISCONTINUED | OUTPATIENT
Start: 2023-04-04 | End: 2023-04-07 | Stop reason: HOSPADM

## 2023-04-04 RX ORDER — CETIRIZINE HYDROCHLORIDE 10 MG/1
10 TABLET ORAL DAILY
Status: DISCONTINUED | OUTPATIENT
Start: 2023-04-04 | End: 2023-04-07 | Stop reason: HOSPADM

## 2023-04-04 RX ORDER — AZELASTINE 1 MG/ML
1 SPRAY, METERED NASAL 2 TIMES DAILY
Status: DISCONTINUED | OUTPATIENT
Start: 2023-04-04 | End: 2023-04-07 | Stop reason: HOSPADM

## 2023-04-04 RX ORDER — ONDANSETRON 2 MG/ML
4 INJECTION INTRAMUSCULAR; INTRAVENOUS EVERY 6 HOURS PRN
Status: DISCONTINUED | OUTPATIENT
Start: 2023-04-04 | End: 2023-04-07 | Stop reason: HOSPADM

## 2023-04-04 RX ORDER — HYDRALAZINE HYDROCHLORIDE 10 MG/1
10 TABLET, FILM COATED ORAL 3 TIMES DAILY PRN
COMMUNITY

## 2023-04-04 RX ORDER — METOPROLOL TARTRATE 5 MG/5ML
5 INJECTION INTRAVENOUS ONCE
Status: COMPLETED | OUTPATIENT
Start: 2023-04-04 | End: 2023-04-04

## 2023-04-04 RX ORDER — MONTELUKAST SODIUM 10 MG/1
10 TABLET ORAL DAILY
Status: DISCONTINUED | OUTPATIENT
Start: 2023-04-04 | End: 2023-04-07 | Stop reason: HOSPADM

## 2023-04-04 RX ORDER — ACETAMINOPHEN 325 MG/1
650 TABLET ORAL EVERY 6 HOURS PRN
Status: DISCONTINUED | OUTPATIENT
Start: 2023-04-04 | End: 2023-04-07 | Stop reason: HOSPADM

## 2023-04-04 RX ORDER — DICYCLOMINE HYDROCHLORIDE 10 MG/1
20 CAPSULE ORAL
Status: DISCONTINUED | OUTPATIENT
Start: 2023-04-04 | End: 2023-04-07 | Stop reason: HOSPADM

## 2023-04-04 RX ADMIN — METOPROLOL TARTRATE 50 MG: 50 TABLET, FILM COATED ORAL at 21:45

## 2023-04-04 RX ADMIN — DICYCLOMINE HYDROCHLORIDE 20 MG: 10 CAPSULE ORAL at 10:16

## 2023-04-04 RX ADMIN — MORPHINE SULFATE 4 MG: 4 INJECTION, SOLUTION INTRAMUSCULAR; INTRAVENOUS at 05:20

## 2023-04-04 RX ADMIN — FLECAINIDE ACETATE 50 MG: 50 TABLET ORAL at 21:45

## 2023-04-04 RX ADMIN — ONDANSETRON 4 MG: 2 INJECTION INTRAMUSCULAR; INTRAVENOUS at 05:16

## 2023-04-04 RX ADMIN — APIXABAN 5 MG: 5 TABLET, FILM COATED ORAL at 10:16

## 2023-04-04 RX ADMIN — HYDROCODONE BITARTRATE AND ACETAMINOPHEN 1 TABLET: 5; 325 TABLET ORAL at 20:26

## 2023-04-04 RX ADMIN — APIXABAN 5 MG: 5 TABLET, FILM COATED ORAL at 21:45

## 2023-04-04 RX ADMIN — FLECAINIDE ACETATE 50 MG: 50 TABLET ORAL at 10:16

## 2023-04-04 RX ADMIN — DICYCLOMINE HYDROCHLORIDE 20 MG: 10 CAPSULE ORAL at 17:07

## 2023-04-04 RX ADMIN — HYDROCODONE BITARTRATE AND ACETAMINOPHEN 1 TABLET: 5; 325 TABLET ORAL at 14:33

## 2023-04-04 RX ADMIN — VANCOMYCIN HYDROCHLORIDE 1250 MG: 10 INJECTION, POWDER, LYOPHILIZED, FOR SOLUTION INTRAVENOUS at 08:04

## 2023-04-04 RX ADMIN — Medication 2 PUFF: at 20:51

## 2023-04-04 RX ADMIN — PREDNISONE 4 MG: 1 TABLET ORAL at 10:17

## 2023-04-04 RX ADMIN — LEFLUNOMIDE 20 MG: 20 TABLET ORAL at 10:17

## 2023-04-04 RX ADMIN — CETIRIZINE HYDROCHLORIDE 10 MG: 10 TABLET, FILM COATED ORAL at 10:17

## 2023-04-04 RX ADMIN — CEFEPIME 2000 MG: 2 INJECTION, POWDER, FOR SOLUTION INTRAVENOUS at 20:28

## 2023-04-04 RX ADMIN — ESTROGENS, CONJUGATED 0.62 MG: 0.62 TABLET, FILM COATED ORAL at 10:17

## 2023-04-04 RX ADMIN — Medication 1 CAPSULE: at 14:35

## 2023-04-04 RX ADMIN — METRONIDAZOLE 500 MG: 500 INJECTION, SOLUTION INTRAVENOUS at 17:39

## 2023-04-04 RX ADMIN — Medication 10 ML: at 20:20

## 2023-04-04 RX ADMIN — METOPROLOL TARTRATE 5 MG: 5 INJECTION INTRAVENOUS at 05:25

## 2023-04-04 RX ADMIN — AZELASTINE HYDROCHLORIDE 1 SPRAY: 137 SPRAY, METERED NASAL at 10:25

## 2023-04-04 RX ADMIN — MONTELUKAST SODIUM 10 MG: 10 TABLET, FILM COATED ORAL at 10:17

## 2023-04-04 RX ADMIN — PANTOPRAZOLE SODIUM 40 MG: 40 INJECTION, POWDER, LYOPHILIZED, FOR SOLUTION INTRAVENOUS at 17:08

## 2023-04-04 RX ADMIN — SODIUM CHLORIDE 2280 ML: 9 INJECTION, SOLUTION INTRAVENOUS at 07:05

## 2023-04-04 RX ADMIN — ZOLPIDEM TARTRATE 5 MG: 5 TABLET ORAL at 23:23

## 2023-04-04 RX ADMIN — Medication 20 ML: at 10:17

## 2023-04-04 RX ADMIN — CEFEPIME 2000 MG: 2 INJECTION, POWDER, FOR SOLUTION INTRAVENOUS at 07:25

## 2023-04-04 RX ADMIN — DICYCLOMINE HYDROCHLORIDE 20 MG: 10 CAPSULE ORAL at 21:44

## 2023-04-04 RX ADMIN — SODIUM CHLORIDE: 9 INJECTION, SOLUTION INTRAVENOUS at 17:28

## 2023-04-04 RX ADMIN — DICYCLOMINE HYDROCHLORIDE 20 MG: 10 CAPSULE ORAL at 14:33

## 2023-04-04 RX ADMIN — LORAZEPAM 1 MG: 2 INJECTION INTRAMUSCULAR; INTRAVENOUS at 05:14

## 2023-04-04 RX ADMIN — METRONIDAZOLE 500 MG: 500 INJECTION, SOLUTION INTRAVENOUS at 10:25

## 2023-04-04 RX ADMIN — ONDANSETRON 4 MG: 2 INJECTION INTRAMUSCULAR; INTRAVENOUS at 20:20

## 2023-04-04 RX ADMIN — POTASSIUM CHLORIDE 20 MEQ: 1500 TABLET, EXTENDED RELEASE ORAL at 17:08

## 2023-04-04 ASSESSMENT — PAIN DESCRIPTION - ORIENTATION
ORIENTATION: INNER
ORIENTATION: INNER

## 2023-04-04 ASSESSMENT — PAIN SCALES - GENERAL
PAINLEVEL_OUTOF10: 7
PAINLEVEL_OUTOF10: 10
PAINLEVEL_OUTOF10: 9
PAINLEVEL_OUTOF10: 7
PAINLEVEL_OUTOF10: 4
PAINLEVEL_OUTOF10: 0

## 2023-04-04 ASSESSMENT — PAIN DESCRIPTION - DESCRIPTORS
DESCRIPTORS: SHARP;STABBING
DESCRIPTORS: CRAMPING

## 2023-04-04 ASSESSMENT — LIFESTYLE VARIABLES
HOW OFTEN DO YOU HAVE A DRINK CONTAINING ALCOHOL: NEVER
HOW MANY STANDARD DRINKS CONTAINING ALCOHOL DO YOU HAVE ON A TYPICAL DAY: PATIENT DOES NOT DRINK

## 2023-04-04 ASSESSMENT — PAIN DESCRIPTION - LOCATION
LOCATION: ABDOMEN

## 2023-04-04 ASSESSMENT — PAIN DESCRIPTION - PAIN TYPE: TYPE: ACUTE PAIN

## 2023-04-04 ASSESSMENT — PAIN DESCRIPTION - ONSET: ONSET: ON-GOING

## 2023-04-04 ASSESSMENT — PAIN - FUNCTIONAL ASSESSMENT: PAIN_FUNCTIONAL_ASSESSMENT: ACTIVITIES ARE NOT PREVENTED

## 2023-04-04 ASSESSMENT — PAIN DESCRIPTION - FREQUENCY: FREQUENCY: CONTINUOUS

## 2023-04-04 NOTE — H&P
reports current alcohol use of about 1.0 - 2.0 standard drink per week. E-cigarette/Vaping       Questions Responses    E-cigarette/Vaping Use     Start Date     Passive Exposure     Quit Date     Counseling Given     Comments               Family History:      Next reviewed and negative in regards to presenting illness/complaint. Problem Relation Age of Onset    Breast Cancer Neg Hx     Ovarian Cancer Neg Hx        REVIEW OF SYSTEMS COMPLETED:   Pertinent positives as noted in the HPI. All other systems reviewed and negative. PHYSICAL EXAM PERFORMED:    BP (!) 160/109   Pulse 81   Temp 98 °F (36.7 °C) (Oral)   Resp 13   Wt 167 lb 8.8 oz (76 kg)   SpO2 93%   BMI 24.74 kg/m²     General appearance:  No apparent distress, appears stated age and cooperative. HEENT:  Normal cephalic, atraumatic without obvious deformity. Pupils equal, round, and reactive to light. Extra ocular muscles intact. Conjunctivae/corneas clear. Neck: Supple, with full range of motion. No jugular venous distention. Trachea midline. Respiratory:  Normal respiratory effort. Clear to auscultation, bilaterally without Rales/Wheezes/Rhonchi. Cardiovascular:  Regular rate and rhythm with normal S1/S2 without murmurs, rubs or gallops. Abdomen: Soft, non-tender, non-distended with normal bowel sounds. Musculoskeletal:  No clubbing, cyanosis or edema bilaterally. Full range of motion without deformity. Skin: Skin color, texture, turgor normal.  No rashes or lesions. Neurologic:  Neurovascularly intact without any focal sensory/motor deficits.  Cranial nerves: II-XII intact, grossly non-focal.  Psychiatric:  Alert and oriented, thought content appropriate, normal insight  Capillary Refill: Brisk,3 seconds, normal  Peripheral Pulses: +2 palpable, equal bilaterally       Labs:     Recent Labs     04/04/23  0445   WBC 16.1*   HGB 17.3*   HCT 50.6*        Recent Labs     04/04/23  0445      K 4.0   CL 96*   CO2 18*   BUN

## 2023-04-04 NOTE — RT PROTOCOL NOTE
breathing using Per Protocol order mode. 4-6 - enter or revise RT Bronchodilator order(s) to two equivalent RT bronchodilator orders with one order with BID Frequency and one order with Frequency of every 4 hours PRN wheezing or increased work of breathing using Per Protocol order mode. 7-10 - enter or revise RT Bronchodilator order(s) to two equivalent RT bronchodilator orders with one order with TID Frequency and one order with Frequency of every 4 hours PRN wheezing or increased work of breathing using Per Protocol order mode. 11-13 - enter or revise RT Bronchodilator order(s) to one equivalent RT bronchodilator order with QID Frequency and an Albuterol order with Frequency of every 4 hours PRN wheezing or increased work of breathing using Per Protocol order mode. Greater than 13 - enter or revise RT Bronchodilator order(s) to one equivalent RT bronchodilator order with every 4 hours Frequency and an Albuterol order with Frequency of every 2 hours PRN wheezing or increased work of breathing using Per Protocol order mode. RT to enter RT Home Evaluation for COPD & MDI Assessment order using Per Protocol order mode.     Electronically signed by Cinthia Alba RCP on 4/4/2023 at 9:55 AM

## 2023-04-04 NOTE — ED PROVIDER NOTES
HISTORY       Past Surgical History:   Procedure Laterality Date    CHOLECYSTECTOMY      HYSTERECTOMY (CERVIX STATUS UNKNOWN)           CURRENT MEDICATIONS       Previous Medications    ACETAMINOPHEN-CODEINE (TYLENOL WITH CODEINE #3 PO)    Take by mouth daily    ALBUTEROL SULFATE HFA (PROVENTIL;VENTOLIN;PROAIR) 108 (90 BASE) MCG/ACT INHALER    INHALE 2 PUFFS EVERY 4 HOURS WHILE AWAKE FOR 7-10 DAYS, THEN EVERY 6 HOURS AS NEEDED FOR WHEEZING    ALPRAZOLAM (XANAX) 2 MG TABLET    TAKE 1/2 TO 1 TABLET BY MOUTH THREE TIMES DAILY AS NEEDED FOR ANXIETY    AZELASTINE (ASTELIN) 0.1 % NASAL SPRAY    USE 1 SPRAY IN EACH NOSTRIL TWICE DAILY AS DIRECTED    DICYCLOMINE (BENTYL) 20 MG TABLET    Take 20 mg by mouth 4 times daily as needed    ELIQUIS 5 MG TABS TABLET    TAKE 1 TABLET BY MOUTH TWICE DAILY    ESTROGENS, CONJUGATED, (PREMARIN) 0.625 MG TABLET    Take 0.625 mg by mouth daily    FLECAINIDE (TAMBOCOR) 50 MG TABLET    Take 1 tablet by mouth 2 times daily    FLUTICASONE-SALMETEROL (ADVAIR HFA) 115-21 MCG/ACT INHALER    INHALE 2 PUFFS INTO THE LUNGS DAILY    KETOCONAZOLE (NIZORAL) 2 % SHAMPOO    Use to wash the scalp 3 times weekly. Leave on the scalp for 5 minutes prior to rinsing.     LEFLUNOMIDE (ARAVA) 20 MG TABLET    Take 20 mg by mouth daily    LORATADINE (CLARITIN) 10 MG TABLET    TAKE 1 TABLET BY MOUTH DAILY    METOPROLOL TARTRATE (LOPRESSOR) 25 MG TABLET    TAKE 2 TABLETS(50 MG) BY MOUTH TWICE DAILY    MONTELUKAST (SINGULAIR) 10 MG TABLET    TAKE 1 TABLET BY MOUTH IN THE MORNING    POTASSIUM CHLORIDE (KLOR-CON M) 20 MEQ EXTENDED RELEASE TABLET    TAKE 1 TABLET BY MOUTH TWICE DAILY    PREDNISONE (DELTASONE) 1 MG TABLET    Take 4 mg by mouth daily     UBROGEPANT (UBRELVY PO)    Take by mouth    ZOLPIDEM (AMBIEN) 10 MG TABLET    TAKE 1 TABLET BY MOUTH EVERY NIGHT AS NEEDED FOR SLEEP       ALLERGIES     Amlodipine, Bystolic [nebivolol hcl], Demerol, Dilaudid [hydromorphone hcl], Losartan, Penicillins, Spironolactone,

## 2023-04-04 NOTE — ED NOTES
Report given to [facility] JOVITA Kent. No further questions at this time. Pt awaiting transport to floor at this time.       Siri Contreras RN  04/04/23 1776

## 2023-04-05 LAB
ANION GAP SERPL CALCULATED.3IONS-SCNC: 10 MMOL/L (ref 3–16)
BUN SERPL-MCNC: 9 MG/DL (ref 7–20)
CALCIUM SERPL-MCNC: 8.5 MG/DL (ref 8.3–10.6)
CHLORIDE SERPL-SCNC: 108 MMOL/L (ref 99–110)
CO2 SERPL-SCNC: 23 MMOL/L (ref 21–32)
CREAT SERPL-MCNC: 0.6 MG/DL (ref 0.6–1.1)
DEPRECATED RDW RBC AUTO: 14.4 % (ref 12.4–15.4)
GFR SERPLBLD CREATININE-BSD FMLA CKD-EPI: >60 ML/MIN/{1.73_M2}
GI PATHOGENS PNL STL NAA+PROBE: NORMAL
GLUCOSE BLD-MCNC: 124 MG/DL (ref 70–99)
GLUCOSE SERPL-MCNC: 124 MG/DL (ref 70–99)
HCT VFR BLD AUTO: 37.8 % (ref 36–48)
HGB BLD-MCNC: 12.5 G/DL (ref 12–16)
MAGNESIUM SERPL-MCNC: 1.9 MG/DL (ref 1.8–2.4)
MCH RBC QN AUTO: 31.4 PG (ref 26–34)
MCHC RBC AUTO-ENTMCNC: 33.1 G/DL (ref 31–36)
MCV RBC AUTO: 95 FL (ref 80–100)
PERFORMED ON: ABNORMAL
PLATELET # BLD AUTO: 250 K/UL (ref 135–450)
PMV BLD AUTO: 7.1 FL (ref 5–10.5)
POTASSIUM SERPL-SCNC: 3.3 MMOL/L (ref 3.5–5.1)
RBC # BLD AUTO: 3.98 M/UL (ref 4–5.2)
SODIUM SERPL-SCNC: 141 MMOL/L (ref 136–145)
WBC # BLD AUTO: 8 K/UL (ref 4–11)

## 2023-04-05 PROCEDURE — 94640 AIRWAY INHALATION TREATMENT: CPT

## 2023-04-05 PROCEDURE — 85027 COMPLETE CBC AUTOMATED: CPT

## 2023-04-05 PROCEDURE — 1200000000 HC SEMI PRIVATE

## 2023-04-05 PROCEDURE — 6360000002 HC RX W HCPCS: Performed by: INTERNAL MEDICINE

## 2023-04-05 PROCEDURE — 6360000002 HC RX W HCPCS: Performed by: STUDENT IN AN ORGANIZED HEALTH CARE EDUCATION/TRAINING PROGRAM

## 2023-04-05 PROCEDURE — 6370000000 HC RX 637 (ALT 250 FOR IP): Performed by: INTERNAL MEDICINE

## 2023-04-05 PROCEDURE — 80048 BASIC METABOLIC PNL TOTAL CA: CPT

## 2023-04-05 PROCEDURE — 2580000003 HC RX 258: Performed by: INTERNAL MEDICINE

## 2023-04-05 PROCEDURE — 2500000003 HC RX 250 WO HCPCS: Performed by: INTERNAL MEDICINE

## 2023-04-05 PROCEDURE — 94761 N-INVAS EAR/PLS OXIMETRY MLT: CPT

## 2023-04-05 PROCEDURE — C9113 INJ PANTOPRAZOLE SODIUM, VIA: HCPCS | Performed by: INTERNAL MEDICINE

## 2023-04-05 PROCEDURE — 83735 ASSAY OF MAGNESIUM: CPT

## 2023-04-05 RX ORDER — POTASSIUM CHLORIDE 20 MEQ/1
40 TABLET, EXTENDED RELEASE ORAL ONCE
Status: COMPLETED | OUTPATIENT
Start: 2023-04-05 | End: 2023-04-05

## 2023-04-05 RX ORDER — SUMATRIPTAN 25 MG/1
50 TABLET, FILM COATED ORAL ONCE
Status: COMPLETED | OUTPATIENT
Start: 2023-04-05 | End: 2023-04-05

## 2023-04-05 RX ORDER — MORPHINE SULFATE 4 MG/ML
4 INJECTION, SOLUTION INTRAMUSCULAR; INTRAVENOUS ONCE
Status: COMPLETED | OUTPATIENT
Start: 2023-04-05 | End: 2023-04-05

## 2023-04-05 RX ORDER — MORPHINE SULFATE 2 MG/ML
2 INJECTION, SOLUTION INTRAMUSCULAR; INTRAVENOUS EVERY 4 HOURS PRN
Status: DISCONTINUED | OUTPATIENT
Start: 2023-04-05 | End: 2023-04-07 | Stop reason: HOSPADM

## 2023-04-05 RX ADMIN — SUMATRIPTAN SUCCINATE 50 MG: 25 TABLET ORAL at 09:29

## 2023-04-05 RX ADMIN — Medication 2 PUFF: at 08:21

## 2023-04-05 RX ADMIN — PANTOPRAZOLE SODIUM 40 MG: 40 INJECTION, POWDER, LYOPHILIZED, FOR SOLUTION INTRAVENOUS at 08:46

## 2023-04-05 RX ADMIN — DICYCLOMINE HYDROCHLORIDE 20 MG: 10 CAPSULE ORAL at 16:08

## 2023-04-05 RX ADMIN — POTASSIUM CHLORIDE 20 MEQ: 1500 TABLET, EXTENDED RELEASE ORAL at 08:45

## 2023-04-05 RX ADMIN — LEFLUNOMIDE 20 MG: 20 TABLET ORAL at 08:46

## 2023-04-05 RX ADMIN — MORPHINE SULFATE 2 MG: 2 INJECTION, SOLUTION INTRAMUSCULAR; INTRAVENOUS at 11:39

## 2023-04-05 RX ADMIN — ALPRAZOLAM 0.5 MG: 0.5 TABLET ORAL at 00:48

## 2023-04-05 RX ADMIN — DICYCLOMINE HYDROCHLORIDE 20 MG: 10 CAPSULE ORAL at 21:12

## 2023-04-05 RX ADMIN — ZOLPIDEM TARTRATE 5 MG: 5 TABLET ORAL at 23:17

## 2023-04-05 RX ADMIN — POTASSIUM CHLORIDE 40 MEQ: 1500 TABLET, EXTENDED RELEASE ORAL at 11:39

## 2023-04-05 RX ADMIN — FLECAINIDE ACETATE 50 MG: 50 TABLET ORAL at 21:12

## 2023-04-05 RX ADMIN — APIXABAN 5 MG: 5 TABLET, FILM COATED ORAL at 08:46

## 2023-04-05 RX ADMIN — MONTELUKAST SODIUM 10 MG: 10 TABLET, FILM COATED ORAL at 08:45

## 2023-04-05 RX ADMIN — ALPRAZOLAM 0.5 MG: 0.5 TABLET ORAL at 08:38

## 2023-04-05 RX ADMIN — FLECAINIDE ACETATE 50 MG: 50 TABLET ORAL at 08:50

## 2023-04-05 RX ADMIN — METRONIDAZOLE 500 MG: 500 INJECTION, SOLUTION INTRAVENOUS at 16:11

## 2023-04-05 RX ADMIN — CEFEPIME 2000 MG: 2 INJECTION, POWDER, FOR SOLUTION INTRAVENOUS at 08:17

## 2023-04-05 RX ADMIN — METOPROLOL TARTRATE 50 MG: 50 TABLET, FILM COATED ORAL at 21:12

## 2023-04-05 RX ADMIN — ESTROGENS, CONJUGATED 0.62 MG: 0.62 TABLET, FILM COATED ORAL at 08:46

## 2023-04-05 RX ADMIN — MORPHINE SULFATE 4 MG: 4 INJECTION, SOLUTION INTRAMUSCULAR; INTRAVENOUS at 05:47

## 2023-04-05 RX ADMIN — APIXABAN 5 MG: 5 TABLET, FILM COATED ORAL at 21:11

## 2023-04-05 RX ADMIN — MORPHINE SULFATE 2 MG: 2 INJECTION, SOLUTION INTRAMUSCULAR; INTRAVENOUS at 16:16

## 2023-04-05 RX ADMIN — PREDNISONE 4 MG: 1 TABLET ORAL at 08:45

## 2023-04-05 RX ADMIN — Medication 10 ML: at 08:46

## 2023-04-05 RX ADMIN — AZELASTINE HYDROCHLORIDE 1 SPRAY: 137 SPRAY, METERED NASAL at 08:50

## 2023-04-05 RX ADMIN — SODIUM CHLORIDE: 9 INJECTION, SOLUTION INTRAVENOUS at 16:10

## 2023-04-05 RX ADMIN — Medication 2 PUFF: at 19:54

## 2023-04-05 RX ADMIN — ALPRAZOLAM 0.5 MG: 0.5 TABLET ORAL at 21:11

## 2023-04-05 RX ADMIN — CEFEPIME 2000 MG: 2 INJECTION, POWDER, FOR SOLUTION INTRAVENOUS at 20:22

## 2023-04-05 RX ADMIN — METRONIDAZOLE 500 MG: 500 INJECTION, SOLUTION INTRAVENOUS at 12:20

## 2023-04-05 RX ADMIN — HYDROCODONE BITARTRATE AND ACETAMINOPHEN 1 TABLET: 5; 325 TABLET ORAL at 23:17

## 2023-04-05 RX ADMIN — METOPROLOL TARTRATE 50 MG: 50 TABLET, FILM COATED ORAL at 08:46

## 2023-04-05 RX ADMIN — HYDROCODONE BITARTRATE AND ACETAMINOPHEN 1 TABLET: 5; 325 TABLET ORAL at 03:58

## 2023-04-05 RX ADMIN — CETIRIZINE HYDROCHLORIDE 10 MG: 10 TABLET, FILM COATED ORAL at 08:45

## 2023-04-05 RX ADMIN — METRONIDAZOLE 500 MG: 500 INJECTION, SOLUTION INTRAVENOUS at 00:50

## 2023-04-05 RX ADMIN — DICYCLOMINE HYDROCHLORIDE 20 MG: 10 CAPSULE ORAL at 05:47

## 2023-04-05 RX ADMIN — POTASSIUM CHLORIDE 20 MEQ: 1500 TABLET, EXTENDED RELEASE ORAL at 16:08

## 2023-04-05 RX ADMIN — DICYCLOMINE HYDROCHLORIDE 20 MG: 10 CAPSULE ORAL at 11:39

## 2023-04-05 RX ADMIN — Medication 1 CAPSULE: at 08:46

## 2023-04-05 ASSESSMENT — PAIN DESCRIPTION - ONSET
ONSET: ON-GOING

## 2023-04-05 ASSESSMENT — PAIN DESCRIPTION - ORIENTATION
ORIENTATION: LOWER

## 2023-04-05 ASSESSMENT — PAIN DESCRIPTION - FREQUENCY
FREQUENCY: CONTINUOUS

## 2023-04-05 ASSESSMENT — PAIN DESCRIPTION - LOCATION
LOCATION: ABDOMEN

## 2023-04-05 ASSESSMENT — PAIN DESCRIPTION - PAIN TYPE
TYPE: ACUTE PAIN

## 2023-04-05 ASSESSMENT — PAIN SCALES - GENERAL
PAINLEVEL_OUTOF10: 10
PAINLEVEL_OUTOF10: 7
PAINLEVEL_OUTOF10: 7
PAINLEVEL_OUTOF10: 6
PAINLEVEL_OUTOF10: 4
PAINLEVEL_OUTOF10: 4
PAINLEVEL_OUTOF10: 10

## 2023-04-05 ASSESSMENT — PAIN DESCRIPTION - DESCRIPTORS
DESCRIPTORS: ACHING
DESCRIPTORS: CRAMPING
DESCRIPTORS: CRAMPING

## 2023-04-05 NOTE — CARE COORDINATION
Chart reviewed. Will follow for any d/c needs. No therapy ordered. From home , has insurance and PCP.       Stewart Christiansen RN, BSN  [phone]

## 2023-04-06 LAB
ANION GAP SERPL CALCULATED.3IONS-SCNC: 8 MMOL/L (ref 3–16)
BUN SERPL-MCNC: 7 MG/DL (ref 7–20)
CALCIUM SERPL-MCNC: 8.6 MG/DL (ref 8.3–10.6)
CHLORIDE SERPL-SCNC: 110 MMOL/L (ref 99–110)
CO2 SERPL-SCNC: 23 MMOL/L (ref 21–32)
CREAT SERPL-MCNC: <0.5 MG/DL (ref 0.6–1.1)
GFR SERPLBLD CREATININE-BSD FMLA CKD-EPI: >60 ML/MIN/{1.73_M2}
GLUCOSE SERPL-MCNC: 99 MG/DL (ref 70–99)
POTASSIUM SERPL-SCNC: 3.9 MMOL/L (ref 3.5–5.1)
SODIUM SERPL-SCNC: 141 MMOL/L (ref 136–145)

## 2023-04-06 PROCEDURE — 94640 AIRWAY INHALATION TREATMENT: CPT

## 2023-04-06 PROCEDURE — 6360000002 HC RX W HCPCS: Performed by: INTERNAL MEDICINE

## 2023-04-06 PROCEDURE — 6370000000 HC RX 637 (ALT 250 FOR IP): Performed by: STUDENT IN AN ORGANIZED HEALTH CARE EDUCATION/TRAINING PROGRAM

## 2023-04-06 PROCEDURE — 94761 N-INVAS EAR/PLS OXIMETRY MLT: CPT

## 2023-04-06 PROCEDURE — 2500000003 HC RX 250 WO HCPCS: Performed by: INTERNAL MEDICINE

## 2023-04-06 PROCEDURE — 2140000000 HC CCU INTERMEDIATE R&B

## 2023-04-06 PROCEDURE — 6370000000 HC RX 637 (ALT 250 FOR IP): Performed by: INTERNAL MEDICINE

## 2023-04-06 PROCEDURE — 80048 BASIC METABOLIC PNL TOTAL CA: CPT

## 2023-04-06 PROCEDURE — 2580000003 HC RX 258: Performed by: INTERNAL MEDICINE

## 2023-04-06 PROCEDURE — C9113 INJ PANTOPRAZOLE SODIUM, VIA: HCPCS | Performed by: INTERNAL MEDICINE

## 2023-04-06 RX ORDER — HYDROCORTISONE ACETATE 25 MG/1
25 SUPPOSITORY RECTAL 2 TIMES DAILY
Status: DISCONTINUED | OUTPATIENT
Start: 2023-04-06 | End: 2023-04-07 | Stop reason: HOSPADM

## 2023-04-06 RX ORDER — SUMATRIPTAN 25 MG/1
50 TABLET, FILM COATED ORAL ONCE
Status: COMPLETED | OUTPATIENT
Start: 2023-04-06 | End: 2023-04-06

## 2023-04-06 RX ADMIN — AZELASTINE HYDROCHLORIDE 1 SPRAY: 137 SPRAY, METERED NASAL at 09:07

## 2023-04-06 RX ADMIN — HYDROCORTISONE ACETATE 25 MG: 25 SUPPOSITORY RECTAL at 12:40

## 2023-04-06 RX ADMIN — HYDROCORTISONE ACETATE 25 MG: 25 SUPPOSITORY RECTAL at 19:46

## 2023-04-06 RX ADMIN — APIXABAN 5 MG: 5 TABLET, FILM COATED ORAL at 09:05

## 2023-04-06 RX ADMIN — DICYCLOMINE HYDROCHLORIDE 20 MG: 10 CAPSULE ORAL at 04:53

## 2023-04-06 RX ADMIN — Medication 10 ML: at 09:05

## 2023-04-06 RX ADMIN — CEFEPIME 2000 MG: 2 INJECTION, POWDER, FOR SOLUTION INTRAVENOUS at 19:54

## 2023-04-06 RX ADMIN — CETIRIZINE HYDROCHLORIDE 10 MG: 10 TABLET, FILM COATED ORAL at 09:05

## 2023-04-06 RX ADMIN — AZELASTINE HYDROCHLORIDE 1 SPRAY: 137 SPRAY, METERED NASAL at 19:47

## 2023-04-06 RX ADMIN — Medication 10 ML: at 19:47

## 2023-04-06 RX ADMIN — DICYCLOMINE HYDROCHLORIDE 20 MG: 10 CAPSULE ORAL at 19:46

## 2023-04-06 RX ADMIN — PREDNISONE 4 MG: 1 TABLET ORAL at 09:10

## 2023-04-06 RX ADMIN — DICYCLOMINE HYDROCHLORIDE 20 MG: 10 CAPSULE ORAL at 09:04

## 2023-04-06 RX ADMIN — FLECAINIDE ACETATE 50 MG: 50 TABLET ORAL at 19:46

## 2023-04-06 RX ADMIN — ALPRAZOLAM 0.5 MG: 0.5 TABLET ORAL at 04:43

## 2023-04-06 RX ADMIN — PANTOPRAZOLE SODIUM 40 MG: 40 INJECTION, POWDER, LYOPHILIZED, FOR SOLUTION INTRAVENOUS at 09:03

## 2023-04-06 RX ADMIN — ALPRAZOLAM 0.5 MG: 0.5 TABLET ORAL at 15:25

## 2023-04-06 RX ADMIN — METOPROLOL TARTRATE 50 MG: 50 TABLET, FILM COATED ORAL at 09:04

## 2023-04-06 RX ADMIN — Medication 2 PUFF: at 08:59

## 2023-04-06 RX ADMIN — Medication 2 PUFF: at 20:04

## 2023-04-06 RX ADMIN — Medication 1 CAPSULE: at 09:04

## 2023-04-06 RX ADMIN — METOPROLOL TARTRATE 50 MG: 50 TABLET, FILM COATED ORAL at 19:46

## 2023-04-06 RX ADMIN — FLECAINIDE ACETATE 50 MG: 50 TABLET ORAL at 09:04

## 2023-04-06 RX ADMIN — ESTROGENS, CONJUGATED 0.62 MG: 0.62 TABLET, FILM COATED ORAL at 09:10

## 2023-04-06 RX ADMIN — APIXABAN 5 MG: 5 TABLET, FILM COATED ORAL at 19:46

## 2023-04-06 RX ADMIN — POTASSIUM CHLORIDE 20 MEQ: 1500 TABLET, EXTENDED RELEASE ORAL at 09:04

## 2023-04-06 RX ADMIN — DICYCLOMINE HYDROCHLORIDE 20 MG: 10 CAPSULE ORAL at 11:29

## 2023-04-06 RX ADMIN — METRONIDAZOLE 500 MG: 500 INJECTION, SOLUTION INTRAVENOUS at 03:40

## 2023-04-06 RX ADMIN — CEFEPIME 2000 MG: 2 INJECTION, POWDER, FOR SOLUTION INTRAVENOUS at 07:53

## 2023-04-06 RX ADMIN — MORPHINE SULFATE 2 MG: 2 INJECTION, SOLUTION INTRAMUSCULAR; INTRAVENOUS at 07:14

## 2023-04-06 RX ADMIN — METRONIDAZOLE 500 MG: 500 INJECTION, SOLUTION INTRAVENOUS at 11:31

## 2023-04-06 RX ADMIN — DICYCLOMINE HYDROCHLORIDE 20 MG: 10 CAPSULE ORAL at 16:43

## 2023-04-06 RX ADMIN — MONTELUKAST SODIUM 10 MG: 10 TABLET, FILM COATED ORAL at 09:04

## 2023-04-06 RX ADMIN — POTASSIUM CHLORIDE 20 MEQ: 1500 TABLET, EXTENDED RELEASE ORAL at 17:06

## 2023-04-06 RX ADMIN — ALPRAZOLAM 0.5 MG: 0.5 TABLET ORAL at 19:46

## 2023-04-06 RX ADMIN — SUMATRIPTAN SUCCINATE 50 MG: 25 TABLET ORAL at 03:54

## 2023-04-06 ASSESSMENT — PAIN SCALES - GENERAL
PAINLEVEL_OUTOF10: 5
PAINLEVEL_OUTOF10: 0
PAINLEVEL_OUTOF10: 8
PAINLEVEL_OUTOF10: 5

## 2023-04-06 NOTE — RT PROTOCOL NOTE
breathing using Per Protocol order mode. 4-6 - enter or revise RT Bronchodilator order(s) to two equivalent RT bronchodilator orders with one order with BID Frequency and one order with Frequency of every 4 hours PRN wheezing or increased work of breathing using Per Protocol order mode. 7-10 - enter or revise RT Bronchodilator order(s) to two equivalent RT bronchodilator orders with one order with TID Frequency and one order with Frequency of every 4 hours PRN wheezing or increased work of breathing using Per Protocol order mode. 11-13 - enter or revise RT Bronchodilator order(s) to one equivalent RT bronchodilator order with QID Frequency and an Albuterol order with Frequency of every 4 hours PRN wheezing or increased work of breathing using Per Protocol order mode. Greater than 13 - enter or revise RT Bronchodilator order(s) to one equivalent RT bronchodilator order with every 4 hours Frequency and an Albuterol order with Frequency of every 2 hours PRN wheezing or increased work of breathing using Per Protocol order mode. RT to enter RT Home Evaluation for COPD & MDI Assessment order using Per Protocol order mode.     Electronically signed by Radha Feliciano RCP on 4/6/2023 at 9:05 AM

## 2023-04-06 NOTE — PLAN OF CARE
Problem: Pain  Goal: Verbalizes/displays adequate comfort level or baseline comfort level  Outcome: Progressing     Problem: Safety - Adult  Goal: Free from fall injury  Outcome: Progressing  Flowsheets (Taken 4/6/2023 0736)  Free From Fall Injury: Instruct family/caregiver on patient safety     Problem: ABCDS Injury Assessment  Goal: Absence of physical injury  Outcome: Progressing  Flowsheets (Taken 4/6/2023 0736)  Absence of Physical Injury: Implement safety measures based on patient assessment     Problem: Chronic Conditions and Co-morbidities  Goal: Patient's chronic conditions and co-morbidity symptoms are monitored and maintained or improved  Outcome: Progressing

## 2023-04-07 VITALS
OXYGEN SATURATION: 96 % | BODY MASS INDEX: 25.67 KG/M2 | HEIGHT: 69 IN | WEIGHT: 173.28 LBS | DIASTOLIC BLOOD PRESSURE: 76 MMHG | HEART RATE: 60 BPM | SYSTOLIC BLOOD PRESSURE: 135 MMHG | RESPIRATION RATE: 16 BRPM | TEMPERATURE: 97 F

## 2023-04-07 LAB
ANION GAP SERPL CALCULATED.3IONS-SCNC: 10 MMOL/L (ref 3–16)
BUN SERPL-MCNC: 13 MG/DL (ref 7–20)
CALCIUM SERPL-MCNC: 9.2 MG/DL (ref 8.3–10.6)
CHLORIDE SERPL-SCNC: 111 MMOL/L (ref 99–110)
CO2 SERPL-SCNC: 23 MMOL/L (ref 21–32)
CREAT SERPL-MCNC: 0.6 MG/DL (ref 0.6–1.1)
GFR SERPLBLD CREATININE-BSD FMLA CKD-EPI: >60 ML/MIN/{1.73_M2}
GLUCOSE SERPL-MCNC: 106 MG/DL (ref 70–99)
POTASSIUM SERPL-SCNC: 3.8 MMOL/L (ref 3.5–5.1)
SODIUM SERPL-SCNC: 144 MMOL/L (ref 136–145)

## 2023-04-07 PROCEDURE — 94640 AIRWAY INHALATION TREATMENT: CPT

## 2023-04-07 PROCEDURE — C9113 INJ PANTOPRAZOLE SODIUM, VIA: HCPCS | Performed by: INTERNAL MEDICINE

## 2023-04-07 PROCEDURE — 2500000003 HC RX 250 WO HCPCS: Performed by: INTERNAL MEDICINE

## 2023-04-07 PROCEDURE — 2580000003 HC RX 258: Performed by: INTERNAL MEDICINE

## 2023-04-07 PROCEDURE — 6370000000 HC RX 637 (ALT 250 FOR IP): Performed by: INTERNAL MEDICINE

## 2023-04-07 PROCEDURE — 80048 BASIC METABOLIC PNL TOTAL CA: CPT

## 2023-04-07 PROCEDURE — 94761 N-INVAS EAR/PLS OXIMETRY MLT: CPT

## 2023-04-07 PROCEDURE — 6360000002 HC RX W HCPCS: Performed by: INTERNAL MEDICINE

## 2023-04-07 RX ORDER — HYDROCORTISONE ACETATE 25 MG/1
25 SUPPOSITORY RECTAL 2 TIMES DAILY
Qty: 30 SUPPOSITORY | Refills: 0 | Status: SHIPPED | OUTPATIENT
Start: 2023-04-07

## 2023-04-07 RX ORDER — LACTOBACILLUS RHAMNOSUS GG 10B CELL
1 CAPSULE ORAL DAILY
Qty: 10 CAPSULE | Refills: 0 | Status: SHIPPED | OUTPATIENT
Start: 2023-04-08

## 2023-04-07 RX ORDER — ONDANSETRON 4 MG/1
4 TABLET, FILM COATED ORAL EVERY 8 HOURS PRN
Qty: 15 TABLET | Refills: 0 | Status: SHIPPED | OUTPATIENT
Start: 2023-04-07

## 2023-04-07 RX ORDER — METRONIDAZOLE 500 MG/1
500 TABLET ORAL 3 TIMES DAILY
Qty: 12 TABLET | Refills: 0 | Status: SHIPPED | OUTPATIENT
Start: 2023-04-07 | End: 2023-04-11

## 2023-04-07 RX ORDER — CEFPODOXIME PROXETIL 200 MG/1
200 TABLET, FILM COATED ORAL 2 TIMES DAILY
Qty: 8 TABLET | Refills: 0 | Status: SHIPPED | OUTPATIENT
Start: 2023-04-07 | End: 2023-04-11

## 2023-04-07 RX ADMIN — METRONIDAZOLE 500 MG: 500 INJECTION, SOLUTION INTRAVENOUS at 00:15

## 2023-04-07 RX ADMIN — Medication 2 PUFF: at 08:05

## 2023-04-07 RX ADMIN — HYDROCORTISONE ACETATE 25 MG: 25 SUPPOSITORY RECTAL at 08:20

## 2023-04-07 RX ADMIN — METOPROLOL TARTRATE 50 MG: 50 TABLET, FILM COATED ORAL at 08:19

## 2023-04-07 RX ADMIN — AZELASTINE HYDROCHLORIDE 1 SPRAY: 137 SPRAY, METERED NASAL at 08:22

## 2023-04-07 RX ADMIN — ALPRAZOLAM 0.5 MG: 0.5 TABLET ORAL at 05:24

## 2023-04-07 RX ADMIN — FLECAINIDE ACETATE 50 MG: 50 TABLET ORAL at 08:20

## 2023-04-07 RX ADMIN — DICYCLOMINE HYDROCHLORIDE 20 MG: 10 CAPSULE ORAL at 05:24

## 2023-04-07 RX ADMIN — PREDNISONE 4 MG: 1 TABLET ORAL at 08:20

## 2023-04-07 RX ADMIN — CETIRIZINE HYDROCHLORIDE 10 MG: 10 TABLET, FILM COATED ORAL at 08:20

## 2023-04-07 RX ADMIN — MONTELUKAST SODIUM 10 MG: 10 TABLET, FILM COATED ORAL at 08:19

## 2023-04-07 RX ADMIN — APIXABAN 5 MG: 5 TABLET, FILM COATED ORAL at 08:19

## 2023-04-07 RX ADMIN — CEFEPIME 2000 MG: 2 INJECTION, POWDER, FOR SOLUTION INTRAVENOUS at 08:28

## 2023-04-07 RX ADMIN — METRONIDAZOLE 500 MG: 500 INJECTION, SOLUTION INTRAVENOUS at 06:22

## 2023-04-07 RX ADMIN — PANTOPRAZOLE SODIUM 40 MG: 40 INJECTION, POWDER, LYOPHILIZED, FOR SOLUTION INTRAVENOUS at 08:21

## 2023-04-07 RX ADMIN — Medication 10 ML: at 08:20

## 2023-04-07 RX ADMIN — Medication 1 CAPSULE: at 08:19

## 2023-04-07 RX ADMIN — POTASSIUM CHLORIDE 20 MEQ: 1500 TABLET, EXTENDED RELEASE ORAL at 08:20

## 2023-04-07 RX ADMIN — ZOLPIDEM TARTRATE 5 MG: 5 TABLET ORAL at 00:12

## 2023-04-07 RX ADMIN — ESTROGENS, CONJUGATED 0.62 MG: 0.62 TABLET, FILM COATED ORAL at 08:20

## 2023-04-07 RX ADMIN — DICYCLOMINE HYDROCHLORIDE 20 MG: 10 CAPSULE ORAL at 07:59

## 2023-04-07 ASSESSMENT — PAIN SCALES - GENERAL
PAINLEVEL_OUTOF10: 0
PAINLEVEL_OUTOF10: 4
PAINLEVEL_OUTOF10: 0

## 2023-04-07 ASSESSMENT — PAIN DESCRIPTION - ORIENTATION: ORIENTATION: MID

## 2023-04-07 ASSESSMENT — PAIN DESCRIPTION - FREQUENCY: FREQUENCY: INTERMITTENT

## 2023-04-07 ASSESSMENT — PAIN DESCRIPTION - LOCATION: LOCATION: ABDOMEN

## 2023-04-07 ASSESSMENT — PAIN DESCRIPTION - DESCRIPTORS: DESCRIPTORS: CRAMPING

## 2023-04-07 NOTE — PLAN OF CARE
Problem: Pain  Goal: Verbalizes/displays adequate comfort level or baseline comfort level  4/7/2023 1015 by Vanessa Williamson RN  Outcome: Progressing  Flowsheets (Taken 4/6/2023 1954 by Stephanie Murillo RN)  Verbalizes/displays adequate comfort level or baseline comfort level:   Encourage patient to monitor pain and request assistance   Assess pain using appropriate pain scale   Administer analgesics based on type and severity of pain and evaluate response   Implement non-pharmacological measures as appropriate and evaluate response   Consider cultural and social influences on pain and pain management   Notify Licensed Independent Practitioner if interventions unsuccessful or patient reports new pain  Note: Pt able to properly identify pain and properly notified RN when necessary. Pt able to properly use pain scale and rate pain.     4/7/2023 0242 by Stephanie Murillo RN  Outcome: Progressing  Flowsheets (Taken 4/6/2023 1954)  Verbalizes/displays adequate comfort level or baseline comfort level:   Encourage patient to monitor pain and request assistance   Assess pain using appropriate pain scale   Administer analgesics based on type and severity of pain and evaluate response   Implement non-pharmacological measures as appropriate and evaluate response   Consider cultural and social influences on pain and pain management   Notify Licensed Independent Practitioner if interventions unsuccessful or patient reports new pain     Problem: Safety - Adult  Goal: Free from fall injury  4/7/2023 1015 by Vanessa Williamson RN  Outcome: Progressing  4/7/2023 0242 by Stephanie Murillo RN  Outcome: Progressing     Problem: ABCDS Injury Assessment  Goal: Absence of physical injury  4/7/2023 0242 by Stephanie Murillo RN  Outcome: Progressing     Problem: Chronic Conditions and Co-morbidities  Goal: Patient's chronic conditions and co-morbidity symptoms are monitored and maintained or improved  4/7/2023 1015 by Vanessa Williamson RN  Outcome:

## 2023-04-07 NOTE — PLAN OF CARE
Problem: Pain  Goal: Verbalizes/displays adequate comfort level or baseline comfort level  Outcome: Progressing  Flowsheets (Taken 4/6/2023 1954)  Verbalizes/displays adequate comfort level or baseline comfort level:   Encourage patient to monitor pain and request assistance   Assess pain using appropriate pain scale   Administer analgesics based on type and severity of pain and evaluate response   Implement non-pharmacological measures as appropriate and evaluate response   Consider cultural and social influences on pain and pain management   Notify Licensed Independent Practitioner if interventions unsuccessful or patient reports new pain     Problem: Safety - Adult  Goal: Free from fall injury  Outcome: Progressing     Problem: ABCDS Injury Assessment  Goal: Absence of physical injury  Outcome: Progressing     Problem: Chronic Conditions and Co-morbidities  Goal: Patient's chronic conditions and co-morbidity symptoms are monitored and maintained or improved  Outcome: Progressing  Flowsheets (Taken 4/6/2023 1954)  Care Plan - Patient's Chronic Conditions and Co-Morbidity Symptoms are Monitored and Maintained or Improved:   Monitor and assess patient's chronic conditions and comorbid symptoms for stability, deterioration, or improvement   Collaborate with multidisciplinary team to address chronic and comorbid conditions and prevent exacerbation or deterioration   Update acute care plan with appropriate goals if chronic or comorbid symptoms are exacerbated and prevent overall improvement and discharge

## 2023-04-07 NOTE — CARE COORDINATION
Patient discharged 4/7/2023 to home.   All discharge needs met per case management     Alecia Johnson RN, BSN  [phone]

## 2023-04-07 NOTE — FLOWSHEET NOTE
04/07/23 0759   Vitals   Temp 97 °F (36.1 °C)   Temp Source Temporal   Heart Rate 62   Heart Rate Source Monitor   Resp 16   /76   MAP (Calculated) 96   MAP (mmHg) 93   BP Location Right upper arm   BP Upper/Lower Upper   BP Method Automatic   Patient Position Semi fowlers   Level of Consciousness 0   MEWS Score 1   Cardiac Rhythm Sinus rhythm   Pain Assessment   Pain Assessment 0-10   Pain Level 4   Pain Location Abdomen   Pain Orientation Mid   Pain Descriptors Cramping   Pain Frequency Intermittent   Non-Pharmaceutical Pain Intervention(s) Repositioned   Response to Pain Intervention Patient satisfied   Oxygen Therapy   SpO2 96 %   Pulse Oximetry Type Intermittent   Pulse Oximeter Device Mode Intermittent   Pulse Oximeter Device Location Finger   O2 Device None (Room air)     Shift assessment complete. VSS. Pt. Is alert and oriented resting comfortably in bed. Pt. Has complaints of stomach cramping but states PO medication helps symptoms. Pt states she ate \"longhorn\" last night that her  brought in and did not experience any stomach pain or cramping. Denies diarrhea at this time. POC discussed with patient and patient states understanding and is in agreement with plan. Call light and bedside table within reach. No further needs expressed at this time.  Shannan Duffy RN

## 2023-04-07 NOTE — DISCHARGE SUMMARY
Hospital Medicine Discharge Summary    Patient ID: Lia James      Patient's PCP: Leela White MD    Admit Date: 4/4/2023     Discharge Date:   ***    Admitting Provider: John Menon MD     Discharge Provider: John Menon MD     Discharge Diagnoses: Active Hospital Problems    Diagnosis     Enteritis [K52.9]        The patient was seen and examined on day of discharge and this discharge summary is in conjunction with any daily progress note from day of discharge. Hospital Course:     61 y.o. female with history of IBS, hypertension, rheumatoid arthritis, chronic pain started having nausea, vomiting, diarrhea along with abdominal cramp 2 days ago. She has bouts of IBS flareup usually with nausea, vomiting, diarrhea. But this time abdominal cramps were worse. No fever. Apparently her son was sick 1 week ago with similar symptoms and was treated with antibiotics. Patient has not been able to eat much for last few days. Came in today for evaluation. No recent use of antibiotics. Enteritis: Presented with lactic elevation and tachycardia, leukocytosis. CT scan findings noted. IV fluids given. Lactate normalized. Stool C. difficile and GI PCR negative. Blood cultures negative so far. Allergic to penicillin. Currently on cefepime and Flagyl. Tolerating liquids okay. Overall improving. Advance diet to regular. Encourage p.o. pain medications. Continue antiemetics as needed     GERD: PPI     Paroxysmal atrial fibrillation: Continue Tambocor, Lopressor, Eliquis     Rheumatoid arthritis: Continue home dose prednisone and leflunomide     History of IBS: Continue Bentyl. Chronic pain syndrome     Migraine: Imitrex as needed     Hypokalemia: Secondary to GI losses. Start on daily supplements for next few days.      Fissure/hemorrhoids: Start on Anusol [facility] ([place], [place] [place], [facility].    Dced with cefpodoime ad flagyl and probitoic and zofrm and anusol hc      Physical Exam Performed:     BP

## 2023-04-08 LAB
BACTERIA BLD CULT ORG #2: NORMAL
BACTERIA BLD CULT: NORMAL

## 2023-04-10 DIAGNOSIS — F41.9 ANXIETY: ICD-10-CM

## 2023-04-10 RX ORDER — ALPRAZOLAM 2 MG/1
TABLET ORAL
Qty: 90 TABLET | OUTPATIENT
Start: 2023-04-10

## 2023-04-10 NOTE — PROGRESS NOTES
04/04/23 0954   RT Protocol   History Pulmonary Disease 2   Respiratory pattern 0   Breath sounds 0   Cough 0   Indications for Bronchodilator Therapy On home bronchodilators   Bronchodilator Assessment Score 2
Data- discharge order received, pt verbalized agreement to discharge, disposition to previous residence, no needs for HHC/DME. Action- discharge instructions prepared and given to patient, pt verbalized understanding. Medication information packet given r/t NEW and/or CHANGED prescriptions emphasizing name/purpose/side effects, pt verbalized understanding. Discharge instruction summary: Diet- General, Activity- up as tolerated, Primary Care Physician as followsUmberto Nelson -[phone] f/u appointment made by patient, immunizations reviewed, prescription medication filled at pharmacy of choice per pt request.     1. WEIGHT: Admit Weight: 167 lb 8.8 oz (76 kg) (04/04/23 0603)        Today  Weight: 173 lb 4.5 oz (78.6 kg) (04/07/23 0609)       2. O2 SAT.: SpO2: 96 % (04/07/23 0805)    Response- Pt belongings gathered, IV removed. Disposition is home (no HHC/DME needs), transported with , taken to lobby via w/c w/ belongings, no complications. D/C instructions given to pt and reviewed with pt at bedside including new medications, side effects, and discharge plan and pt states understanding and is in agreement with discharge plan.     Yahaira Klein RN
Hospitalist Progress Note      PCP: Evens Terrell MD    Date of Admission: 4/4/2023    Chief Complaint: Nausea, vomiting, abdominal cramps    Hospital Course:    61 y.o. female with history of IBS, hypertension, rheumatoid arthritis, chronic pain started having nausea, vomiting, diarrhea along with abdominal cramp 2 days ago. She has bouts of IBS flareup usually with nausea, vomiting, diarrhea. But this time abdominal cramps were worse. No fever. Apparently her son was sick 1 week ago with similar symptoms and was treated with antibiotics. Patient has not been able to eat much for last few days. Came in today for evaluation. No recent use of antibiotics. Subjective: Doing a little better. Tolerating clears okay. .  Still having profuse diarrhea. Took morphine this morning for abdominal pain.       Medications:  Reviewed    Infusion Medications    sodium chloride      dextrose      sodium chloride 75 mL/hr at 04/06/23 0606     Scheduled Medications    hydrocortisone  25 mg Rectal BID    [Held by provider] leflunomide  20 mg Oral Daily    predniSONE  4 mg Oral Daily    estrogens (conjugated)  0.625 mg Oral Daily    apixaban  5 mg Oral BID    metoprolol tartrate  50 mg Oral BID    azelastine  1 spray Each Nostril BID    flecainide  50 mg Oral BID    potassium chloride  20 mEq Oral BID WC    montelukast  10 mg Oral Daily    cetirizine  10 mg Oral Daily    sodium chloride flush  5-40 mL IntraVENous 2 times per day    metroNIDAZOLE  500 mg IntraVENous Q8H    cefepime  2,000 mg IntraVENous Q12H    mometasone-formoterol  2 puff Inhalation BID    dicyclomine  20 mg Oral 4x Daily AC & HS    lactobacillus  1 capsule Oral Daily    pantoprazole  40 mg IntraVENous Daily     PRN Meds: morphine, ondansetron, dicyclomine, zolpidem, albuterol sulfate HFA, ALPRAZolam, sodium chloride flush, sodium chloride, acetaminophen **OR** acetaminophen, dextrose bolus **OR** dextrose bolus, glucagon (rDNA), dextrose, HYDROcodone
Hospitalist Progress Note      PCP: Vivek Louis MD    Date of Admission: 4/4/2023    Chief Complaint: Nausea, vomiting, abdominal cramps    Hospital Course:    61 y.o. female with history of IBS, hypertension, rheumatoid arthritis, chronic pain started having nausea, vomiting, diarrhea along with abdominal cramp 2 days ago. She has bouts of IBS flareup usually with nausea, vomiting, diarrhea. But this time abdominal cramps were worse. No fever. Apparently her son was sick 1 week ago with similar symptoms and was treated with antibiotics. Patient has not been able to eat much for last few days. Came in today for evaluation. No recent use of antibiotics. Subjective: No nausea. Able to keep down liquids okay. Still having profuse diarrhea, nonbloody. Having some headache. Asking for stronger pain medications for abdominal pain.       Medications:  Reviewed    Infusion Medications    sodium chloride      dextrose      sodium chloride 75 mL/hr at 04/04/23 1728     Scheduled Medications    leflunomide  20 mg Oral Daily    predniSONE  4 mg Oral Daily    estrogens (conjugated)  0.625 mg Oral Daily    apixaban  5 mg Oral BID    metoprolol tartrate  50 mg Oral BID    azelastine  1 spray Each Nostril BID    flecainide  50 mg Oral BID    potassium chloride  20 mEq Oral BID WC    montelukast  10 mg Oral Daily    cetirizine  10 mg Oral Daily    sodium chloride flush  5-40 mL IntraVENous 2 times per day    metroNIDAZOLE  500 mg IntraVENous Q8H    cefepime  2,000 mg IntraVENous Q12H    mometasone-formoterol  2 puff Inhalation BID    dicyclomine  20 mg Oral 4x Daily AC & HS    insulin lispro  0-4 Units SubCUTAneous TID WC    insulin lispro  0-4 Units SubCUTAneous Nightly    lactobacillus  1 capsule Oral Daily    pantoprazole  40 mg IntraVENous Daily     PRN Meds: morphine, ondansetron, dicyclomine, zolpidem, albuterol sulfate HFA, ALPRAZolam, sodium chloride flush, sodium chloride, acetaminophen **OR**
Patient admitted to 2917 from ER. Vitals stable. Admission completed.
Physician Progress Note      Alexus Marti  CSN #:                  [id number]  :                       1965  ADMIT DATE:       2023 4:31 AM  DISCH DATE:        2023 3:04 PM  RESPONDING  PROVIDER #:        Winnie Jacome MD          QUERY TEXT:    Patient admitted with enteritis , noted to have atrial fibrillation and is   maintained on Eliquis. If possible, please document in progress notes and   discharge summary if you are evaluating and/or treating any of the following: The medical record reflects the following:  Risk Factors: 60 yo female with HTN    Clinical Indicators: IM  , Paroxysmal atrial fibrillation: Continue   Tambocor, Lopressor, Eliquis    Treatment: Eliquis, supportive care  Options provided:  -- Secondary hypercoagulable state in a patient with atrial fibrillation  -- Other - I will add my own diagnosis  -- Disagree - Not applicable / Not valid  -- Disagree - Clinically unable to determine / Unknown  -- Refer to Clinical Documentation Reviewer    PROVIDER RESPONSE TEXT:    This patient has secondary hypercoagulable state in a patient with atrial   fibrillation. Query created by:  Selwyn Concepcion on 2023 9:36 AM      Electronically signed by:  Winnie Jacome MD 2023 9:25 PM
Pt having increased pain. MD messaged, 1 time order for morphine given to patient, see MAR.
Pt refusing blood glucose checks
RESPONSE TEXT:    Treating patient for bacterial enteritis. Query created by:  Arlette Clements on 4/6/2023 4:48 PM      Electronically signed by:  Philomena Valentine MD 4/7/2023 8:20 AM

## 2023-04-19 ENCOUNTER — OFFICE VISIT (OUTPATIENT)
Dept: INTERNAL MEDICINE CLINIC | Age: 58
End: 2023-04-19

## 2023-04-19 VITALS
OXYGEN SATURATION: 96 % | WEIGHT: 179 LBS | TEMPERATURE: 97.9 F | DIASTOLIC BLOOD PRESSURE: 84 MMHG | HEIGHT: 69 IN | SYSTOLIC BLOOD PRESSURE: 128 MMHG | HEART RATE: 86 BPM | BODY MASS INDEX: 26.51 KG/M2

## 2023-04-19 DIAGNOSIS — I48.0 PAROXYSMAL ATRIAL FIBRILLATION (HCC): ICD-10-CM

## 2023-04-19 DIAGNOSIS — M06.09 RHEUMATOID ARTHRITIS OF MULTIPLE SITES WITH NEGATIVE RHEUMATOID FACTOR (HCC): ICD-10-CM

## 2023-04-19 DIAGNOSIS — Z09 HOSPITAL DISCHARGE FOLLOW-UP: ICD-10-CM

## 2023-04-19 DIAGNOSIS — K52.9 ENTERITIS: Primary | ICD-10-CM

## 2023-04-19 SDOH — ECONOMIC STABILITY: FOOD INSECURITY: WITHIN THE PAST 12 MONTHS, YOU WORRIED THAT YOUR FOOD WOULD RUN OUT BEFORE YOU GOT MONEY TO BUY MORE.: NEVER TRUE

## 2023-04-19 SDOH — ECONOMIC STABILITY: INCOME INSECURITY: HOW HARD IS IT FOR YOU TO PAY FOR THE VERY BASICS LIKE FOOD, HOUSING, MEDICAL CARE, AND HEATING?: NOT HARD AT ALL

## 2023-04-19 SDOH — ECONOMIC STABILITY: HOUSING INSECURITY
IN THE LAST 12 MONTHS, WAS THERE A TIME WHEN YOU DID NOT HAVE A STEADY PLACE TO SLEEP OR SLEPT IN A SHELTER (INCLUDING NOW)?: NO

## 2023-04-19 SDOH — ECONOMIC STABILITY: FOOD INSECURITY: WITHIN THE PAST 12 MONTHS, THE FOOD YOU BOUGHT JUST DIDN'T LAST AND YOU DIDN'T HAVE MONEY TO GET MORE.: NEVER TRUE

## 2023-04-19 ASSESSMENT — PATIENT HEALTH QUESTIONNAIRE - PHQ9
2. FEELING DOWN, DEPRESSED OR HOPELESS: 1
SUM OF ALL RESPONSES TO PHQ9 QUESTIONS 1 & 2: 2
SUM OF ALL RESPONSES TO PHQ QUESTIONS 1-9: 2
SUM OF ALL RESPONSES TO PHQ QUESTIONS 1-9: 2
1. LITTLE INTEREST OR PLEASURE IN DOING THINGS: 1
SUM OF ALL RESPONSES TO PHQ QUESTIONS 1-9: 2
SUM OF ALL RESPONSES TO PHQ QUESTIONS 1-9: 2

## 2023-04-19 NOTE — PROGRESS NOTES
Post-Discharge Transitional Care  Follow Up      Krystyna Josue   YOB: 1965    Date of Office Visit:  4/19/2023  Date of Hospital Admission: 4/4/23  Date of Hospital Discharge: 4/7/23  Risk of hospital readmission (high >=14%. Medium >=10%) :Readmission Risk Score: 7      Care management risk score Rising risk (score 2-5) and Complex Care (Scores >=6): No Risk Score On File     Non face to face  following discharge, date last encounter closed (first attempt may have been earlier): *No documented post hospital discharge outreach found in the last 14 days    Call initiated 2 business days of discharge: *No response recorded in the last 14 days    ASSESSMENT/PLAN:   Enteritis  -     Alejandrina Cifuentes MD, Gastroenterology ([facility]), [place]  Rheumatoid arthritis of multiple sites with negative rheumatoid factor ([facility] [address]  -     MD DISCHARGE MEDS RECONCILED W/ CURRENT OUTPATIENT MED LIST  Paroxysmal atrial fibrillation ([facility] [address]  -     MD DISCHARGE MEDS RECONCILED [address] [facility] discharge follow-up  -     MD DISCHARGE MEDS RECONCILED W/ CURRENT OUTPATIENT MED LIST      Medical Decision Making: high complexity  Return in 3 months (on 7/19/2023). On this date 4/19/2023 I have spent 32 minutes reviewing previous notes, test results and face to face with the patient discussing the diagnosis and importance of compliance with the treatment plan as well as documenting on the day of the visit. Subjective:   HPI:  Follow up of Hospital problems/diagnosis(es):   Enteritis: Presented with lactic elevation and tachycardia, leukocytosis. CT scan findings benign. IV fluids given. Lactate normalized. Stool C. difficile and GI PCR negative. Blood cultures negative. Treated and Flagyl. Inpatient course: Discharge summary reviewed- see chart. Interval history/Current status:  The patient continues

## 2023-04-24 ENCOUNTER — OFFICE VISIT (OUTPATIENT)
Dept: CARDIOLOGY CLINIC | Age: 58
End: 2023-04-24
Payer: OTHER GOVERNMENT

## 2023-04-24 VITALS
DIASTOLIC BLOOD PRESSURE: 74 MMHG | WEIGHT: 179 LBS | BODY MASS INDEX: 26.43 KG/M2 | HEART RATE: 84 BPM | SYSTOLIC BLOOD PRESSURE: 118 MMHG

## 2023-04-24 DIAGNOSIS — R94.31 ABNORMAL EKG: ICD-10-CM

## 2023-04-24 DIAGNOSIS — I10 ESSENTIAL HYPERTENSION: ICD-10-CM

## 2023-04-24 DIAGNOSIS — I48.0 PAROXYSMAL ATRIAL FIBRILLATION (HCC): Primary | ICD-10-CM

## 2023-04-24 PROCEDURE — 3078F DIAST BP <80 MM HG: CPT | Performed by: INTERNAL MEDICINE

## 2023-04-24 PROCEDURE — 99214 OFFICE O/P EST MOD 30 MIN: CPT | Performed by: INTERNAL MEDICINE

## 2023-04-24 PROCEDURE — 3074F SYST BP LT 130 MM HG: CPT | Performed by: INTERNAL MEDICINE

## 2023-04-24 ASSESSMENT — ENCOUNTER SYMPTOMS
CHOKING: 0
COUGH: 0
CHEST TIGHTNESS: 0
SHORTNESS OF BREATH: 0

## 2023-04-24 NOTE — PROGRESS NOTES
Subjective:      Patient ID: Raleigh Spatz is a 61 y.o. female. Here for follow up afib/htn/abn ekg. Reviewed hosp for abd pain, N/V and diarrhea. Still with abd issues. Is following with GI. Fatigue. Not real active. No exertional.  No syncope. No pnd or orthopnea. Breathing stable. Rhythm stable. bp reasonable. Past Medical History:   Diagnosis Date    Alcohol abuse, in remission     Anxiety state, unspecified     COVID     Depressive disorder, not elsewhere classified     Diffuse cystic mastopathy     Extrinsic asthma, unspecified     Hypertension     Leiomyoma of uterus, unspecified     Other and unspecified ovarian cyst      Past Surgical History:   Procedure Laterality Date    CHOLECYSTECTOMY      HYSTERECTOMY (CERVIX STATUS UNKNOWN)       Social History     Socioeconomic History    Marital status:      Spouse name: Not on file    Number of children: Not on file    Years of education: Not on file    Highest education level: Not on file   Occupational History    Not on file   Tobacco Use    Smoking status: Never     Passive exposure: Never    Smokeless tobacco: Never   Substance and Sexual Activity    Alcohol use: Yes     Alcohol/week: 1.0 - 2.0 standard drink     Types: 1 - 2 Glasses of wine per week     Comment: 2 drinks qd    Drug use: No    Sexual activity: Yes     Partners: Male   Other Topics Concern    Not on file   Social History Narrative    Not on file     Social Determinants of Health     Financial Resource Strain: Low Risk     Difficulty of Paying Living Expenses: Not hard at all   Food Insecurity: No Food Insecurity    Worried About [address] in the Last Year: Never true    [address] in the Last Year: Never true   Transportation Needs: Unknown    Lack of Transportation (Medical): Not on file    Lack of Transportation (Non-Medical):  No   Physical Activity: Not on file   Stress: Not on file   Social Connections: Not on file   Intimate Partner Violence: Not

## 2023-04-25 NOTE — TELEPHONE ENCOUNTER
Refill request. Fluticasone 50 mcg/act nasal spray - 1 spray each nostril daily. Last filled on 5/26/2022 - 32 g with 1 refill. Last OV 5/26/2022. Please advise. Thank you.

## 2023-04-27 ENCOUNTER — TELEPHONE (OUTPATIENT)
Dept: INTERNAL MEDICINE CLINIC | Age: 58
End: 2023-04-27

## 2023-04-27 RX ORDER — FLUTICASONE PROPIONATE 50 MCG
SPRAY, SUSPENSION (ML) NASAL
Qty: 32 G | Refills: 1 | Status: SHIPPED | OUTPATIENT
Start: 2023-04-27

## 2023-04-27 NOTE — TELEPHONE ENCOUNTER
Patient called in stating that she is hemorrhaging rectally again. Her insurance will not cover rectal suppositories. Patient is not sure what else to do. Pls call and advise.

## 2023-05-04 ENCOUNTER — TELEPHONE (OUTPATIENT)
Dept: SURGERY | Age: 58
End: 2023-05-04

## 2023-05-08 DIAGNOSIS — F41.9 ANXIETY: ICD-10-CM

## 2023-05-09 RX ORDER — ALPRAZOLAM 2 MG/1
TABLET ORAL
Qty: 90 TABLET | Refills: 0 | Status: SHIPPED | OUTPATIENT
Start: 2023-05-09 | End: 2023-06-09

## 2023-05-10 ENCOUNTER — OFFICE VISIT (OUTPATIENT)
Dept: SURGERY | Age: 58
End: 2023-05-10
Payer: OTHER GOVERNMENT

## 2023-05-10 VITALS
SYSTOLIC BLOOD PRESSURE: 138 MMHG | BODY MASS INDEX: 26.07 KG/M2 | DIASTOLIC BLOOD PRESSURE: 93 MMHG | TEMPERATURE: 98.3 F | WEIGHT: 176 LBS | HEIGHT: 69 IN | OXYGEN SATURATION: 96 % | HEART RATE: 95 BPM

## 2023-05-10 DIAGNOSIS — K64.2 GRADE III HEMORRHOIDS: ICD-10-CM

## 2023-05-10 DIAGNOSIS — K62.5 RECTAL BLEEDING: Primary | ICD-10-CM

## 2023-05-10 PROCEDURE — 3080F DIAST BP >= 90 MM HG: CPT | Performed by: SURGERY

## 2023-05-10 PROCEDURE — 3075F SYST BP GE 130 - 139MM HG: CPT | Performed by: SURGERY

## 2023-05-10 PROCEDURE — 99204 OFFICE O/P NEW MOD 45 MIN: CPT | Performed by: SURGERY

## 2023-05-10 RX ORDER — PANTOPRAZOLE SODIUM 40 MG/1
40 TABLET, DELAYED RELEASE ORAL DAILY
COMMUNITY
Start: 2023-05-02

## 2023-05-10 NOTE — PATIENT INSTRUCTIONS
you to do. Please perform a fleets enema 1-2 hours before your appointment. This will insure the rectum is cleaned out. This can be provided from our office for free or at most drug stores over-the-counter. At the doctor's office  Bring a picture ID, insurance information, and any required copay. Please arrive 10 minutes early. The procedure is performed without sedation, as most patients have only minor discomfort, if any. Some patients may experience lightheadedness right after the procedure and need to sit down for 5-10 minutes before leaving the office  Some patients will feel more comfortable having someone else drive them to the appointment, though this is not required    After the procedure: There are no specific wound care instructions other than keeping stools soft as mentioned above  Warm water soaks (5-10 minutes) can be helpful for any discomfort, which can be done 2-3 times per day as needed  You may feel a \"fullness\" in your rectum and the urge to defecate during the first 24 hours. This is normal.  You should expect the hemorrhoid tissue to fall off and pass within 2-10 days. This may be accompanied by passing tissue or a small amount of blood. This is normal.  You will have an office appointment scheduled in approximately 4 weeks to assess the need for additional rubber banding or other treatments. Risks and potential complications  Potential need for additional rubber banding in office (common)  Potential need for future hemorrhoid surgery (uncommon)  Clotting and pain from external hemorrhoids (uncommon)  Rectal bleeding requiring surgery (uncommon)  Difficulty with urinating (uncommon)  Damage to sphincter muscle resulting in changes in your ability to control stool or gas (rare)  Infections requiring emergency surgery and colostomy (very rare)    When should you call the office? You have any questions or concerns. You don't understand how to prepare for your procedure.   You

## 2023-05-10 NOTE — PROGRESS NOTES
[address] [facility] COLORECTAL SURGERY  [address]  Dept: [phone]  Dept Fax: [phone]  Loc: [phone]    Visit Date: 5/10/2023    Dipika Fried is a 61 y.o. female who presents today for: New Patient (hemorrhoids)      HPI:       Dipika Fried is a 61 y.o. female referred by Dr. Hunter Vera for hemorrhoids. Patient has had several months of hemorrhoids symptoms. Main complaints include: bleeding, constipation. Also no steroids for RA, eliquis for CAD. Patient has tried: none  Previous anorectal procedures/banding: no    Patient denies fever, chills, abd pain, nausea, emesis, weight loss. Patient's problem list, medications, past medical, surgical, family, and social histories were reviewed and updated in the chart as indicated today. Past Medical History:   Diagnosis Date    Alcohol abuse, in remission     Anxiety state, unspecified     COVID     Depressive disorder, not elsewhere classified     Diffuse cystic mastopathy     Extrinsic asthma, unspecified     Hypertension     Leiomyoma of uterus, unspecified     Other and unspecified ovarian cyst        Past Surgical History:   Procedure Laterality Date    CHOLECYSTECTOMY      HYSTERECTOMY (CERVIX STATUS UNKNOWN)         Family History: Family history of colorectal cancer/polyps: no    Social History:   Social History     Tobacco Use    Smoking status: Never     Passive exposure: Never    Smokeless tobacco: Never   Substance Use Topics    Alcohol use: Yes     Alcohol/week: 1.0 - 2.0 standard drink     Types: 1 - 2 Glasses of wine per week     Comment: 2 drinks qd      Tobacco cessation counseling provided as appropriate. REVIEW OF SYSTEMS:    Pertinent positives and negatives are mentioned in the HPI above. Otherwise, all other systems were reviewed and negative.       Objective:     Physical Exam   BP (!) 138/93   Pulse 95   Temp 98.3 °F (36.8 °C)

## 2023-05-12 ENCOUNTER — HOSPITAL ENCOUNTER (OUTPATIENT)
Dept: NUCLEAR MEDICINE | Age: 58
Discharge: HOME | End: 2023-05-12
Payer: OTHER GOVERNMENT

## 2023-05-12 DIAGNOSIS — R11.2 NAUSEA AND VOMITING, UNSPECIFIED VOMITING TYPE: ICD-10-CM

## 2023-05-12 PROCEDURE — 3430000000 HC RX DIAGNOSTIC RADIOPHARMACEUTICAL: Performed by: INTERNAL MEDICINE

## 2023-05-12 PROCEDURE — 78264 GASTRIC EMPTYING IMG STUDY: CPT

## 2023-05-12 PROCEDURE — A9541 TC99M SULFUR COLLOID: HCPCS | Performed by: INTERNAL MEDICINE

## 2023-05-12 RX ADMIN — TECHNETIUM TC 99M SULFUR COLLOID 0.82 MILLICURIE: KIT at 09:05

## 2023-05-13 ENCOUNTER — HOSPITAL ENCOUNTER (OUTPATIENT)
Dept: GENERAL RADIOLOGY | Age: 58
Discharge: HOME | End: 2023-05-13
Payer: OTHER GOVERNMENT

## 2023-05-13 DIAGNOSIS — R11.2 NAUSEA AND VOMITING, UNSPECIFIED VOMITING TYPE: ICD-10-CM

## 2023-05-13 PROCEDURE — 74248 X-RAY SM INT F-THRU STD: CPT

## 2023-06-01 RX ORDER — APIXABAN 5 MG/1
TABLET, FILM COATED ORAL
Qty: 180 TABLET | Refills: 3 | Status: SHIPPED | OUTPATIENT
Start: 2023-06-01

## 2023-06-05 ENCOUNTER — TELEPHONE (OUTPATIENT)
Dept: SURGERY | Age: 58
End: 2023-06-05

## 2023-06-05 ENCOUNTER — TELEPHONE (OUTPATIENT)
Dept: ENT CLINIC | Age: 58
End: 2023-06-05

## 2023-06-05 NOTE — TELEPHONE ENCOUNTER
Pt is calling to see if she can has something called in for her. She states she is coughing up big green hunks of stuff and she sound very congested.

## 2023-06-05 NOTE — TELEPHONE ENCOUNTER
The patient called stating out office need to request the EGD results from Dr Milad Garcia office. They will not release then unless we request them.     Dr Kelton Kyle [phone]

## 2023-06-06 NOTE — TELEPHONE ENCOUNTER
I have not seen her in over a year. If she would like to be evaluated you can add her on for Thursday morning at 8 AM, alternatively she can see her family doctor or see an urgent care as well.

## 2023-06-07 DIAGNOSIS — F41.9 ANXIETY: ICD-10-CM

## 2023-06-07 RX ORDER — ALPRAZOLAM 2 MG/1
TABLET ORAL
Qty: 90 TABLET | Refills: 0 | Status: SHIPPED | OUTPATIENT
Start: 2023-06-07 | End: 2023-07-07

## 2023-06-08 NOTE — TELEPHONE ENCOUNTER
Pt called office back and stated she needed to be seen today - I advised that we could not see her today, but if she felt she needed to be seen right away, to call her PCP or go to urgent care.

## 2023-06-09 NOTE — TELEPHONE ENCOUNTER
Called 's office and they have no indication of an EGD being done. I called patient and left a voicmail letting her know they have no record. She should call their office and figure out what is going on with her records.

## 2023-06-13 PROBLEM — R09.81 CONGESTION OF NASAL SINUS: Status: ACTIVE | Noted: 2023-06-13

## 2023-06-21 LAB
ALBUMIN SERPL-MCNC: 4.5 G/DL (ref 3.4–5)
ALBUMIN/GLOB SERPL: 2.4 {RATIO} (ref 1.1–2.2)
ALP SERPL-CCNC: 61 U/L (ref 40–129)
ALT SERPL-CCNC: 13 U/L (ref 10–40)
ANION GAP SERPL CALCULATED.3IONS-SCNC: 14 MMOL/L (ref 3–16)
AST SERPL-CCNC: 17 U/L (ref 15–37)
BASOPHILS # BLD: 0 K/UL (ref 0–0.2)
BASOPHILS NFR BLD: 0.5 %
BILIRUB SERPL-MCNC: 0.4 MG/DL (ref 0–1)
BUN SERPL-MCNC: 15 MG/DL (ref 7–20)
CALCIUM SERPL-MCNC: 9 MG/DL (ref 8.3–10.6)
CHLORIDE SERPL-SCNC: 103 MMOL/L (ref 99–110)
CO2 SERPL-SCNC: 22 MMOL/L (ref 21–32)
CREAT SERPL-MCNC: 0.8 MG/DL (ref 0.6–1.1)
CRP SERPL-MCNC: 6.1 MG/L (ref 0–5.1)
DEPRECATED RDW RBC AUTO: 13.4 % (ref 12.4–15.4)
EOSINOPHIL # BLD: 0.1 K/UL (ref 0–0.6)
EOSINOPHIL NFR BLD: 0.8 %
ERYTHROCYTE [SEDIMENTATION RATE] IN BLOOD BY WESTERGREN METHOD: 9 MM/HR (ref 0–30)
FOLATE SERPL-MCNC: 18.24 NG/ML (ref 4.78–24.2)
GFR SERPLBLD CREATININE-BSD FMLA CKD-EPI: >60 ML/MIN/{1.73_M2}
GLUCOSE SERPL-MCNC: 106 MG/DL (ref 70–99)
HCT VFR BLD AUTO: 37.7 % (ref 36–48)
HGB BLD-MCNC: 13.1 G/DL (ref 12–16)
IRON SATN MFR SERPL: 19 % (ref 15–50)
IRON SERPL-MCNC: 60 UG/DL (ref 37–145)
LYMPHOCYTES # BLD: 1.9 K/UL (ref 1–5.1)
LYMPHOCYTES NFR BLD: 22.7 %
MCH RBC QN AUTO: 34 PG (ref 26–34)
MCHC RBC AUTO-ENTMCNC: 34.9 G/DL (ref 31–36)
MCV RBC AUTO: 97.4 FL (ref 80–100)
MONOCYTES # BLD: 0.5 K/UL (ref 0–1.3)
MONOCYTES NFR BLD: 6.3 %
NEUTROPHILS # BLD: 5.9 K/UL (ref 1.7–7.7)
NEUTROPHILS NFR BLD: 69.7 %
PLATELET # BLD AUTO: 241 K/UL (ref 135–450)
PMV BLD AUTO: 7.8 FL (ref 5–10.5)
POTASSIUM SERPL-SCNC: 4.3 MMOL/L (ref 3.5–5.1)
PROT SERPL-MCNC: 6.4 G/DL (ref 6.4–8.2)
RBC # BLD AUTO: 3.87 M/UL (ref 4–5.2)
SODIUM SERPL-SCNC: 139 MMOL/L (ref 136–145)
TIBC SERPL-MCNC: 323 UG/DL (ref 260–445)
VIT B12 SERPL-MCNC: 1102 PG/ML (ref 211–911)
WBC # BLD AUTO: 8.4 K/UL (ref 4–11)

## 2023-06-22 LAB
IGA SERPL-MCNC: 279 MG/DL (ref 70–400)
TISSUE TRANSGLUTAMINASE IGA: <0.5 U/ML (ref 0–14)

## 2023-06-26 RX ORDER — LORATADINE 10 MG/1
10 TABLET ORAL DAILY
Qty: 30 TABLET | Refills: 3 | OUTPATIENT
Start: 2023-06-26

## 2023-06-29 ENCOUNTER — TELEPHONE (OUTPATIENT)
Dept: ENT CLINIC | Age: 58
End: 2023-06-29

## 2023-06-30 ENCOUNTER — OFFICE VISIT (OUTPATIENT)
Dept: ENT CLINIC | Age: 58
End: 2023-06-30
Payer: OTHER GOVERNMENT

## 2023-06-30 VITALS
HEART RATE: 82 BPM | OXYGEN SATURATION: 95 % | SYSTOLIC BLOOD PRESSURE: 136 MMHG | HEIGHT: 69 IN | DIASTOLIC BLOOD PRESSURE: 93 MMHG | WEIGHT: 177 LBS | TEMPERATURE: 96.9 F | BODY MASS INDEX: 26.22 KG/M2

## 2023-06-30 DIAGNOSIS — J30.9 ALLERGIC RHINITIS, UNSPECIFIED SEASONALITY, UNSPECIFIED TRIGGER: Primary | ICD-10-CM

## 2023-06-30 DIAGNOSIS — M26.621 ARTHRALGIA OF RIGHT TEMPOROMANDIBULAR JOINT: ICD-10-CM

## 2023-06-30 LAB — C DIFF TOX A+B STL QL IA: NORMAL

## 2023-06-30 PROCEDURE — 3080F DIAST BP >= 90 MM HG: CPT | Performed by: STUDENT IN AN ORGANIZED HEALTH CARE EDUCATION/TRAINING PROGRAM

## 2023-06-30 PROCEDURE — 3075F SYST BP GE 130 - 139MM HG: CPT | Performed by: STUDENT IN AN ORGANIZED HEALTH CARE EDUCATION/TRAINING PROGRAM

## 2023-06-30 PROCEDURE — 99213 OFFICE O/P EST LOW 20 MIN: CPT | Performed by: STUDENT IN AN ORGANIZED HEALTH CARE EDUCATION/TRAINING PROGRAM

## 2023-06-30 RX ORDER — AZELASTINE 1 MG/ML
SPRAY, METERED NASAL
Qty: 60 ML | Refills: 1 | Status: SHIPPED | OUTPATIENT
Start: 2023-06-30

## 2023-06-30 RX ORDER — MONTELUKAST SODIUM 10 MG/1
10 TABLET ORAL DAILY
Qty: 30 TABLET | Refills: 3 | Status: SHIPPED | OUTPATIENT
Start: 2023-06-30

## 2023-06-30 RX ORDER — LORATADINE 10 MG/1
10 TABLET ORAL DAILY
Qty: 30 TABLET | Refills: 3 | Status: SHIPPED | OUTPATIENT
Start: 2023-06-30

## 2023-06-30 RX ORDER — FLUTICASONE PROPIONATE 50 MCG
SPRAY, SUSPENSION (ML) NASAL
Qty: 32 G | Refills: 1 | Status: SHIPPED | OUTPATIENT
Start: 2023-06-30

## 2023-07-01 LAB
CRYPTOSP AG STL QL IA: NORMAL
E HISTOLYT AG STL QL IA: NORMAL
FAT STL QL: NORMAL
G LAMBLIA AG STL QL IA: NORMAL
NEUTRAL FAT STL QL: NORMAL

## 2023-07-02 LAB — CALPROTECTIN STL-MCNT: 80 UG/G

## 2023-07-04 LAB — ELASTASE PANC STL-MCNT: 699 UG/G

## 2023-07-06 ENCOUNTER — TELEPHONE (OUTPATIENT)
Dept: INTERNAL MEDICINE CLINIC | Age: 58
End: 2023-07-06

## 2023-07-06 DIAGNOSIS — F41.9 ANXIETY: ICD-10-CM

## 2023-07-06 RX ORDER — ALPRAZOLAM 2 MG/1
TABLET ORAL
Qty: 90 TABLET | Refills: 0 | Status: SHIPPED | OUTPATIENT
Start: 2023-07-06 | End: 2023-07-07 | Stop reason: SDUPTHER

## 2023-07-06 NOTE — TELEPHONE ENCOUNTER
----- Message from Rich Connell sent at 7/6/2023 11:52 AM EDT -----  Subject: Message to Provider    QUESTIONS  Information for Provider? Patient is checking on status of her Xanax Rx. Waljosemechelle called in a request on 07/06 and patient will be out by tomorrow   (07/07) Please approve refill asap and call patient to confirm. ---------------------------------------------------------------------------  --------------  Tyrell Mitchell [email or web address]  [phone]; OK to leave message on voicemail  ---------------------------------------------------------------------------  --------------  SCRIPT ANSWERS  Relationship to Patient?  Self

## 2023-07-07 ENCOUNTER — TELEPHONE (OUTPATIENT)
Dept: CARDIOLOGY CLINIC | Age: 58
End: 2023-07-07

## 2023-07-07 DIAGNOSIS — F41.9 ANXIETY: ICD-10-CM

## 2023-07-07 RX ORDER — ALPRAZOLAM 2 MG/1
TABLET ORAL
Qty: 90 TABLET | Refills: 0 | Status: SHIPPED | OUTPATIENT
Start: 2023-07-07 | End: 2023-08-07

## 2023-07-07 NOTE — TELEPHONE ENCOUNTER
Informed pt Dr. Clementine Mancilla reviewed pt's chart and pt can take a small dose of Zofran once a day only if pt needs to take it more often to call back on Monday to be cleared by EP doctor to take more often. Pt verbalized understanding.

## 2023-07-07 NOTE — TELEPHONE ENCOUNTER
Pt would like  or a partner since he isnt in to let her know if she is able to take Zofran with her heart issues.  Please advise [phone]

## 2023-07-07 NOTE — TELEPHONE ENCOUNTER
Pt called in and has not heard anything about the written script for the xanax. The pharmacy called and told her that we told the pharmacy we do not e-script xanax. Please call and advise.

## 2023-07-19 ENCOUNTER — OFFICE VISIT (OUTPATIENT)
Dept: INTERNAL MEDICINE CLINIC | Age: 58
End: 2023-07-19
Payer: OTHER GOVERNMENT

## 2023-07-19 VITALS
SYSTOLIC BLOOD PRESSURE: 122 MMHG | OXYGEN SATURATION: 98 % | WEIGHT: 180 LBS | TEMPERATURE: 97.5 F | DIASTOLIC BLOOD PRESSURE: 82 MMHG | BODY MASS INDEX: 26.66 KG/M2 | HEIGHT: 69 IN | HEART RATE: 71 BPM

## 2023-07-19 DIAGNOSIS — F51.04 CHRONIC INSOMNIA: Primary | ICD-10-CM

## 2023-07-19 DIAGNOSIS — Z00.00 PREVENTATIVE HEALTH CARE: ICD-10-CM

## 2023-07-19 DIAGNOSIS — F43.22 ADJUSTMENT DISORDER WITH ANXIETY: ICD-10-CM

## 2023-07-19 DIAGNOSIS — Z23 NEED FOR PNEUMOCOCCAL VACCINATION: ICD-10-CM

## 2023-07-19 DIAGNOSIS — I48.0 PAROXYSMAL ATRIAL FIBRILLATION (HCC): ICD-10-CM

## 2023-07-19 PROCEDURE — 90471 IMMUNIZATION ADMIN: CPT | Performed by: HOSPITALIST

## 2023-07-19 PROCEDURE — 3079F DIAST BP 80-89 MM HG: CPT | Performed by: HOSPITALIST

## 2023-07-19 PROCEDURE — 90677 PCV20 VACCINE IM: CPT | Performed by: HOSPITALIST

## 2023-07-19 PROCEDURE — 99214 OFFICE O/P EST MOD 30 MIN: CPT | Performed by: HOSPITALIST

## 2023-07-19 PROCEDURE — 3074F SYST BP LT 130 MM HG: CPT | Performed by: HOSPITALIST

## 2023-07-19 RX ORDER — ABATACEPT 125 MG/ML
INJECTION, SOLUTION SUBCUTANEOUS
COMMUNITY
Start: 2023-06-29

## 2023-07-19 RX ORDER — FAMOTIDINE 40 MG/1
40 TABLET, FILM COATED ORAL NIGHTLY
COMMUNITY
Start: 2023-06-04

## 2023-07-19 RX ORDER — ESTRADIOL 1 MG/1
1.5 TABLET ORAL DAILY
COMMUNITY
Start: 2023-06-21

## 2023-07-19 ASSESSMENT — ENCOUNTER SYMPTOMS
VOICE CHANGE: 0
CHEST TIGHTNESS: 0
VOMITING: 0
ABDOMINAL PAIN: 1
SINUS PRESSURE: 0
CONSTIPATION: 0
DIARRHEA: 1
BLOOD IN STOOL: 0
ABDOMINAL DISTENTION: 0
WHEEZING: 0
SORE THROAT: 0
SHORTNESS OF BREATH: 0
COUGH: 0
SINUS PAIN: 0
NAUSEA: 0
TROUBLE SWALLOWING: 0
BACK PAIN: 0

## 2023-07-19 NOTE — PROGRESS NOTES
suicide. Son is retired  and has PTSD. She has night terrors. She is on  [place] 10, prn Xanex. I have referred her to Dr. Margaux Jason today as her symptoms are poorly controlled. 3. Paroxysmal atrial fibrillation ([address])  -Follows with Dr. Meryle Jenkins 7/24/2023. Well controlled on lopressor/flecanide. Eliquis for CVA prophylaxis. -Memory issues, dysequilibrium. Sees Dr. Dusty Lamar for her various neurologic sx. -RA:  Diagnosed greater thirty years ago. Currently off [employer]. She is on Orencia and prednisone. She sees Dr. Stuart Brink once monthly.       -2001 21 day hospitalization for COVID-19.       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 Orders Placed This Encounter   Procedures    CBC    Comprehensive Metabolic Panel    Lipid, Fasting    Hemoglobin A1C    HIV Screen    External Referral to Psychiatry      No orders of the defined types were placed in this encounter. Medications Discontinued During This Encounter   Medication Reason    hydrocortisone (ANUSOL-HC) 25 MG suppository Therapy completed    omeprazole (PRILOSEC) 40 MG delayed release capsule Therapy completed    estrogens, conjugated, (PREMARIN) 0.625 MG tablet LIST CLEANUP        Return in about 3 months (around 10/19/2023) for Annual Flex Stinson MD    This dictation was generated by voice recognition computer software. Although all attempts are made to edit the dictation for accuracy, there may be errors in the transcription that are not intended.

## 2023-07-21 ENCOUNTER — TELEPHONE (OUTPATIENT)
Dept: INTERNAL MEDICINE CLINIC | Age: 58
End: 2023-07-21

## 2023-07-21 RX ORDER — DOXYCYCLINE HYCLATE 100 MG
100 TABLET ORAL 2 TIMES DAILY
Qty: 10 TABLET | Refills: 0 | Status: SHIPPED | OUTPATIENT
Start: 2023-07-21 | End: 2023-07-26

## 2023-07-21 RX ORDER — METRONIDAZOLE 500 MG/1
500 TABLET ORAL 3 TIMES DAILY
Qty: 15 TABLET | Refills: 0 | Status: SHIPPED | OUTPATIENT
Start: 2023-07-21 | End: 2023-07-26

## 2023-07-21 NOTE — TELEPHONE ENCOUNTER
Puppy bite this morning right hand through finger nail   Stopped the bleeding, cleaned and wrapped  Per the pt vet puppy is up to date on shots but recommends pt get a antibiotic     Pt was crying very emotional Caitie Velazquez has been kicking and bullying puppy and she said the puppy's nerves are shot,  is sick and she is also, I offered her an appointment with Dr Noemi Ndiaye she declined stated she got a number from Dr Ranjith Srivastava for counseling, she is just worried about the puppy being put down.    I listened told her I would send to MD on call told her if she needed anything else's to call office back and that [facility] are always available to her     Please let pt know if something will be called in for her

## 2023-07-21 NOTE — TELEPHONE ENCOUNTER
I spoke with the patient about this. Yes, she is allergic to PCN, states does not know the reaction. Was told as a child.

## 2023-07-21 NOTE — TELEPHONE ENCOUNTER
Yes for a bite on the hand would use prophylactic antibiotics. Can you verify that she has a penicillin allergy, usually would typically use Augmentin but if she is penicillin allergic we will need to use doxycycline and metronidazole in combination.

## 2023-07-24 ENCOUNTER — OFFICE VISIT (OUTPATIENT)
Dept: CARDIOLOGY CLINIC | Age: 58
End: 2023-07-24
Payer: OTHER GOVERNMENT

## 2023-07-24 ENCOUNTER — TELEPHONE (OUTPATIENT)
Dept: INTERNAL MEDICINE CLINIC | Age: 58
End: 2023-07-24

## 2023-07-24 VITALS
BODY MASS INDEX: 25.99 KG/M2 | HEART RATE: 76 BPM | SYSTOLIC BLOOD PRESSURE: 124 MMHG | DIASTOLIC BLOOD PRESSURE: 74 MMHG | WEIGHT: 176 LBS

## 2023-07-24 DIAGNOSIS — R94.31 ABNORMAL EKG: ICD-10-CM

## 2023-07-24 DIAGNOSIS — I48.0 PAROXYSMAL ATRIAL FIBRILLATION (HCC): Primary | ICD-10-CM

## 2023-07-24 DIAGNOSIS — I10 ESSENTIAL HYPERTENSION: ICD-10-CM

## 2023-07-24 PROCEDURE — 3074F SYST BP LT 130 MM HG: CPT | Performed by: INTERNAL MEDICINE

## 2023-07-24 PROCEDURE — 3078F DIAST BP <80 MM HG: CPT | Performed by: INTERNAL MEDICINE

## 2023-07-24 PROCEDURE — 99214 OFFICE O/P EST MOD 30 MIN: CPT | Performed by: INTERNAL MEDICINE

## 2023-07-24 RX ORDER — SODIUM, POTASSIUM,MAG SULFATES 17.5-3.13G
SOLUTION, RECONSTITUTED, ORAL ORAL
COMMUNITY

## 2023-07-24 ASSESSMENT — ENCOUNTER SYMPTOMS
SHORTNESS OF BREATH: 0
CHOKING: 0
COUGH: 0
CHEST TIGHTNESS: 0
ABDOMINAL PAIN: 1

## 2023-07-24 NOTE — PROGRESS NOTES
Subjective:      Patient ID: Eligha Schirmer is a 61 y.o. female. Here for follow up afib/htn/abn ekg. Reviewed hosp for abd pain, N/V and diarrhea. Still with abd issues. Is following with GI. Severe pain throughout. From RA also. Fatigue. Not real active. No exertional.  No syncope. No pnd or orthopnea. Breathing stable. Rhythm stable. bp reasonable. Past Medical History:   Diagnosis Date    Alcohol abuse, in remission     Anxiety state, unspecified     COVID     Depressive disorder, not elsewhere classified     Diffuse cystic mastopathy     Extrinsic asthma, unspecified     Hypertension     Leiomyoma of uterus, unspecified     Other and unspecified ovarian cyst      Past Surgical History:   Procedure Laterality Date    CHOLECYSTECTOMY      HYSTERECTOMY (CERVIX STATUS UNKNOWN)       Social History     Socioeconomic History    Marital status:      Spouse name: Not on file    Number of children: Not on file    Years of education: Not on file    Highest education level: Not on file   Occupational History    Not on file   Tobacco Use    Smoking status: Never     Passive exposure: Never    Smokeless tobacco: Never   Substance and Sexual Activity    Alcohol use: Yes     Alcohol/week: 1.0 - 2.0 standard drink     Types: 1 - 2 Glasses of wine per week     Comment: 2 drinks qd    Drug use: No    Sexual activity: Yes     Partners: Male   Other Topics Concern    Not on file   Social History Narrative    Not on file     Social Determinants of Health     Financial Resource Strain: Low Risk     Difficulty of Paying Living Expenses: Not hard at all   Food Insecurity: No Food Insecurity    Worried About [place] in the Last Year: Never true    [address] in the Last Year: Never true   Transportation Needs: Unknown    Lack of Transportation (Medical): Not on file    Lack of Transportation (Non-Medical):  No   Physical Activity: Not on file   Stress: Not on file   Social Connections: Not on
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
## 2023-07-31 ENCOUNTER — OFFICE VISIT (OUTPATIENT)
Dept: ENT CLINIC | Age: 58
End: 2023-07-31
Payer: OTHER GOVERNMENT

## 2023-07-31 VITALS
HEIGHT: 69 IN | TEMPERATURE: 97.3 F | OXYGEN SATURATION: 94 % | BODY MASS INDEX: 25.92 KG/M2 | HEART RATE: 73 BPM | SYSTOLIC BLOOD PRESSURE: 135 MMHG | DIASTOLIC BLOOD PRESSURE: 91 MMHG | WEIGHT: 175 LBS

## 2023-07-31 DIAGNOSIS — H91.93 BILATERAL HEARING LOSS, UNSPECIFIED HEARING LOSS TYPE: Primary | ICD-10-CM

## 2023-07-31 DIAGNOSIS — H61.22 IMPACTED CERUMEN OF LEFT EAR: ICD-10-CM

## 2023-07-31 DIAGNOSIS — J30.9 ALLERGIC RHINITIS, UNSPECIFIED SEASONALITY, UNSPECIFIED TRIGGER: ICD-10-CM

## 2023-07-31 DIAGNOSIS — J45.20 INTERMITTENT ASTHMA WITHOUT COMPLICATION, UNSPECIFIED ASTHMA SEVERITY: ICD-10-CM

## 2023-07-31 PROCEDURE — 3080F DIAST BP >= 90 MM HG: CPT | Performed by: STUDENT IN AN ORGANIZED HEALTH CARE EDUCATION/TRAINING PROGRAM

## 2023-07-31 PROCEDURE — 69210 REMOVE IMPACTED EAR WAX UNI: CPT | Performed by: STUDENT IN AN ORGANIZED HEALTH CARE EDUCATION/TRAINING PROGRAM

## 2023-07-31 PROCEDURE — 99213 OFFICE O/P EST LOW 20 MIN: CPT | Performed by: STUDENT IN AN ORGANIZED HEALTH CARE EDUCATION/TRAINING PROGRAM

## 2023-07-31 PROCEDURE — 3075F SYST BP GE 130 - 139MM HG: CPT | Performed by: STUDENT IN AN ORGANIZED HEALTH CARE EDUCATION/TRAINING PROGRAM

## 2023-07-31 RX ORDER — ALBUTEROL SULFATE 90 UG/1
AEROSOL, METERED RESPIRATORY (INHALATION)
Qty: 6.7 G | Refills: 1 | Status: SHIPPED | OUTPATIENT
Start: 2023-07-31

## 2023-07-31 NOTE — PROGRESS NOTES
[facility] [address] VISIT      Patient Name: Barron Bob  Medical Record Number:  [id number]  Primary Care Physician:  Ilda Vazquez MD    ChiefComplaint     Chief Complaint   Patient presents with    Other     Wax in ear        History of Present Illness     Barron Bob is an 61 y.o. female previously seen for allergic rhinitis, right TMJ arthralgia. Interval History:   + asthma. No pulm. Needs refull for albuterol. +Post nasal drainage. Staying inside because allergies and asthma are bad. Choking on pills, set to have her esophagus stretched in the future. When nasal congestion bad she will get migraines. Using flonase and astelin daily. On Singulair and antihistamine daily. + chronic hearing loss. Was diagnosed with meniere's years ago by Dr. Rafaela Rangel. Was on antivert as needed. No recent dizziness. No tinnitus. Past Medical History     Past Medical History:   Diagnosis Date    Alcohol abuse, in remission     Anxiety state, unspecified     COVID     Depressive disorder, not elsewhere classified     Diffuse cystic mastopathy     Extrinsic asthma, unspecified     Hypertension     Leiomyoma of uterus, unspecified     Other and unspecified ovarian cyst        Past Surgical History     Past Surgical History:   Procedure Laterality Date    CHOLECYSTECTOMY      HYSTERECTOMY (CERVIX STATUS UNKNOWN)         Family History     Family History   Problem Relation Age of Onset    Breast Cancer Neg Hx     Ovarian Cancer Neg Hx        Social History     Social History     Tobacco Use    Smoking status: Never     Passive exposure: Never    Smokeless tobacco: Never   Substance Use Topics    Alcohol use:  Yes     Alcohol/week: 1.0 - 2.0 standard drink     Types: 1 - 2 Glasses of wine per week     Comment: 2 drinks qd    Drug use: No        Allergies     Allergies   Allergen Reactions    Amlodipine Other (See Comments)     edema    Bystolic [Nebivolol Hcl]

## 2023-08-01 DIAGNOSIS — F41.9 ANXIETY: ICD-10-CM

## 2023-08-01 RX ORDER — ALPRAZOLAM 2 MG/1
TABLET ORAL
Qty: 90 TABLET | Refills: 0 | Status: SHIPPED | OUTPATIENT
Start: 2023-08-01 | End: 2023-09-01

## 2023-08-15 ENCOUNTER — ANESTHESIA EVENT (OUTPATIENT)
Dept: ENDOSCOPY | Age: 58
End: 2023-08-15
Payer: OTHER GOVERNMENT

## 2023-08-15 NOTE — PROGRESS NOTES
ENDOSCOPY PREOP INSTRUCTIONS      Please at arrival time given to you from your doctor's office. Report to the MAIN entrance on [address] and register at the surgery center on the left-hand side of the lobby  You will need your insurance card and photo id and a list of all medications taken on a regular basis. Please include the dose/frequency. For your procedure:     PLEASE FOLLOW ALL INSTRUCTIONS & PREPS GIVEN TO YOU BY YOUR DOCTOR'S OFFICE. If you have not received these instructions yet, please call the office immediately. Make sure to read them as soon as received. If you are taking blood thinners, Aspirin or diabetic medication, make sure to call your doctor as soon as possible for instructions prior to your procedure. Please dress comfortably and do not wear any lotion, powders or jewelry  If you use oxygen at home, please bring your oxygen tank with you to hospital.  Arrange for someone to be with you and sign you out & drive you home after your procedure. THIS PERSON MUST WAIT AT HOSPITAL THE ENTIRE TIME. We allow 2 adult visitors with you in the hospital & masks are strongly recommended.     WOMEN ONLY OF CHILDBEARING AGE: Please make sure to be able to give a urine sample on arrival      If you have further questions, you may contact your Endoscopist's office or Pre Admission Testing staff at [phone]

## 2023-08-16 ENCOUNTER — HOSPITAL ENCOUNTER (OUTPATIENT)
Age: 58
Setting detail: OUTPATIENT SURGERY
Discharge: HOME | End: 2023-08-16
Attending: INTERNAL MEDICINE | Admitting: INTERNAL MEDICINE
Payer: OTHER GOVERNMENT

## 2023-08-16 ENCOUNTER — ANESTHESIA (OUTPATIENT)
Dept: ENDOSCOPY | Age: 58
End: 2023-08-16
Payer: OTHER GOVERNMENT

## 2023-08-16 VITALS
BODY MASS INDEX: 24.44 KG/M2 | HEIGHT: 69 IN | WEIGHT: 165 LBS | RESPIRATION RATE: 18 BRPM | DIASTOLIC BLOOD PRESSURE: 88 MMHG | OXYGEN SATURATION: 97 % | HEART RATE: 67 BPM | TEMPERATURE: 97.5 F | SYSTOLIC BLOOD PRESSURE: 126 MMHG

## 2023-08-16 DIAGNOSIS — K52.9 CHRONIC DIARRHEA: ICD-10-CM

## 2023-08-16 DIAGNOSIS — R13.10 DYSPHAGIA, UNSPECIFIED TYPE: ICD-10-CM

## 2023-08-16 PROCEDURE — 7100000010 HC PHASE II RECOVERY - FIRST 15 MIN: Performed by: INTERNAL MEDICINE

## 2023-08-16 PROCEDURE — 2709999900 HC NON-CHARGEABLE SUPPLY: Performed by: INTERNAL MEDICINE

## 2023-08-16 PROCEDURE — 3609012400 HC EGD TRANSORAL BIOPSY SINGLE/MULTIPLE: Performed by: INTERNAL MEDICINE

## 2023-08-16 PROCEDURE — 7100000011 HC PHASE II RECOVERY - ADDTL 15 MIN: Performed by: INTERNAL MEDICINE

## 2023-08-16 PROCEDURE — 6360000002 HC RX W HCPCS: Performed by: NURSE ANESTHETIST, CERTIFIED REGISTERED

## 2023-08-16 PROCEDURE — 3609010300 HC COLONOSCOPY W/BIOPSY SINGLE/MULTIPLE: Performed by: INTERNAL MEDICINE

## 2023-08-16 PROCEDURE — 3700000001 HC ADD 15 MINUTES (ANESTHESIA): Performed by: INTERNAL MEDICINE

## 2023-08-16 PROCEDURE — 3700000000 HC ANESTHESIA ATTENDED CARE: Performed by: INTERNAL MEDICINE

## 2023-08-16 PROCEDURE — 2580000003 HC RX 258: Performed by: ANESTHESIOLOGY

## 2023-08-16 PROCEDURE — 88305 TISSUE EXAM BY PATHOLOGIST: CPT

## 2023-08-16 RX ORDER — SODIUM CHLORIDE, SODIUM LACTATE, POTASSIUM CHLORIDE, CALCIUM CHLORIDE 600; 310; 30; 20 MG/100ML; MG/100ML; MG/100ML; MG/100ML
INJECTION, SOLUTION INTRAVENOUS CONTINUOUS
Status: DISCONTINUED | OUTPATIENT
Start: 2023-08-16 | End: 2023-08-16 | Stop reason: HOSPADM

## 2023-08-16 RX ORDER — LIDOCAINE HYDROCHLORIDE 20 MG/ML
INJECTION, SOLUTION INTRAVENOUS PRN
Status: DISCONTINUED | OUTPATIENT
Start: 2023-08-16 | End: 2023-08-16 | Stop reason: SDUPTHER

## 2023-08-16 RX ORDER — ONDANSETRON 2 MG/ML
INJECTION INTRAMUSCULAR; INTRAVENOUS PRN
Status: DISCONTINUED | OUTPATIENT
Start: 2023-08-16 | End: 2023-08-16 | Stop reason: SDUPTHER

## 2023-08-16 RX ORDER — FENTANYL CITRATE 50 UG/ML
INJECTION, SOLUTION INTRAMUSCULAR; INTRAVENOUS PRN
Status: DISCONTINUED | OUTPATIENT
Start: 2023-08-16 | End: 2023-08-16 | Stop reason: SDUPTHER

## 2023-08-16 RX ORDER — PROPOFOL 10 MG/ML
INJECTION, EMULSION INTRAVENOUS PRN
Status: DISCONTINUED | OUTPATIENT
Start: 2023-08-16 | End: 2023-08-16 | Stop reason: SDUPTHER

## 2023-08-16 RX ADMIN — SODIUM CHLORIDE, POTASSIUM CHLORIDE, SODIUM LACTATE AND CALCIUM CHLORIDE: 600; 310; 30; 20 INJECTION, SOLUTION INTRAVENOUS at 09:31

## 2023-08-16 RX ADMIN — FENTANYL CITRATE 25 MCG: 50 INJECTION, SOLUTION INTRAMUSCULAR; INTRAVENOUS at 10:07

## 2023-08-16 RX ADMIN — PROPOFOL 100 MG: 10 INJECTION, EMULSION INTRAVENOUS at 10:03

## 2023-08-16 RX ADMIN — PROPOFOL 100 MCG/KG/MIN: 10 INJECTION, EMULSION INTRAVENOUS at 10:04

## 2023-08-16 RX ADMIN — FENTANYL CITRATE 50 MCG: 50 INJECTION, SOLUTION INTRAMUSCULAR; INTRAVENOUS at 10:03

## 2023-08-16 RX ADMIN — LIDOCAINE HYDROCHLORIDE 50 MG: 20 INJECTION, SOLUTION INTRAVENOUS at 10:03

## 2023-08-16 RX ADMIN — PROPOFOL 50 MG: 10 INJECTION, EMULSION INTRAVENOUS at 10:20

## 2023-08-16 RX ADMIN — FENTANYL CITRATE 25 MCG: 50 INJECTION, SOLUTION INTRAMUSCULAR; INTRAVENOUS at 10:20

## 2023-08-16 RX ADMIN — ONDANSETRON 4 MG: 2 INJECTION INTRAMUSCULAR; INTRAVENOUS at 10:11

## 2023-08-16 ASSESSMENT — PAIN SCALES - GENERAL
PAINLEVEL_OUTOF10: 3
PAINLEVEL_OUTOF10: 0

## 2023-08-16 ASSESSMENT — PAIN DESCRIPTION - PAIN TYPE: TYPE: CHRONIC PAIN

## 2023-08-16 NOTE — DISCHARGE INSTRUCTIONS
ENDOSCOPY DISCHARGE INSTRUCTIONS:    Call the physician that did your procedure for any questions or concerns:                   ACTIVITY:    You may be drowsy or lightheaded after receiving sedation. For the next 24 hours,  DO NOT operate the following: automobile, bicycle, motorcycle, , power tools or large equipment of any kind. Do not drink alcohol, sign any legal documents or make any legal decisions for 24 hours. Plan on bedrest or quiet relaxation today. Resume normal activities in 24 hours    DIET:    Your first meal today should be light, avoiding spicy and fatty foods. If you tolerate this first meal, then you may advance to your regular diet unless otherwise advised by your physician. NOTIFY YOUR PHYSICIAN IF THESE SYMPTOMS OCCUR:  1. Fever (greater than 100)  5. Increased abdominal bloating  2. Severe pain    6. Excessive bleeding  3. Nausea and vomiting  7. Chest pain                                                                    4. Chills    8. Shortness of breath    NORMAL SYMPTOMS:  1. Sore throat  2. Gaseous discomfort: belching or flatus                   ADDITIONAL INSTRUCTIONS:      Recommendations: Follow up pathology results  Recommend PPI daily  Trial of Low FODMAP diet  F/u Dr Judy Magaña for hemorrhoidal banding  Follow up in the office in 4-6 weeks             Please review these discharge instructions this evening or tomorrow for   information you may have forgotten. We want to thank you for choosing the [facility] as your health care provider. We always strive to  provide you with excellent care while you are here. You may receive a survey in the mail regarding your care. We would appreciate you taking a few minutes of your time to complete this survey.   Again, thank you for choosing the [facility]

## 2023-08-16 NOTE — ANESTHESIA PRE PROCEDURE
Department of Anesthesiology  Preprocedure Note       Name:  Hannah Nation   Age:  61 y.o.  :  1965                                          MRN:  [id number]         Date:  2023      Surgeon: Enoc Uribe):  Reji Gimenez MD    Procedure: Procedure(s):  ESOPHAGOGASTRODUODENOSCOPY  COLONOSCOPY    Medications prior to admission:   Prior to Admission medications    Medication Sig Start Date End Date Taking?  Authorizing Provider   ALPRAZolam [place]) 2 MG tablet TAKE 1/2 TO 1 TABLET BY MOUTH THREE TIMES DAILY AS NEEDED FOR ANXIETY 23  Yudi Collins MD   albuterol sulfate HFA (PROVENTIL;VENTOLIN;PROAIR) 108 (90 Base) MCG/ACT inhaler INHALE 2 PUFFS EVERY 4 HOURS WHILE AWAKE FOR 7-10 DAYS, THEN EVERY 6 HOURS AS NEEDED FOR WHEEZING 23   Claude Modi, DO   sodium-potassium-mag sulfate (SUPREP) 17.5-3.13-1.6 GM/177ML SOLN solution 177ml Orally once for 1 days    Historical Provider, MD   famotidine (PEPCID) 40 MG tablet Take 1 tablet by mouth nightly at bedtime  Patient not taking: Reported on 2023   Historical Provider, MD   estradiol (ESTRACE) 1 MG tablet Take 1.5 tablets by mouth daily 23   Historical Provider, MD Goodman Roger 330 MG/ML SOAJ  23   Historical Provider, MD   azelastine (ASTELIN) 0.1 % nasal spray USE 1 SPRAY IN EACH NOSTRIL TWICE DAILY AS DIRECTED 23   Claude Modi,    fluticasone (FLONASE) 50 MCG/ACT nasal spray TAKE 1 SPRAY BY EACH NOSTRIL DAILY 23   Claude Modi,    loratadine (CLARITIN) 10 MG tablet Take 1 tablet by mouth daily 23   Claude Modi, DO   montelukast (SINGULAIR) 10 MG tablet Take 1 tablet by mouth daily 23   Claude Modi, DO   ELIQUIS 5 MG TABS tablet TAKE 1 TABLET BY MOUTH TWICE DAILY 23   Di Herrera MD   pantoprazole (PROTONIX) 40 MG tablet Take 1 tablet by mouth daily 23   Historical Provider, MD   zolpidem (AMBIEN) 10 MG tablet TAKE 1 TABLET BY MOUTH EVERY NIGHT AS NEEDED FOR

## 2023-08-16 NOTE — ANESTHESIA POSTPROCEDURE EVALUATION
Department of Anesthesiology  Postprocedure Note    Patient: Kathy Kessler  MRN: [id number]  YOB: 1965  Date of evaluation: 8/16/2023      Procedure Summary     Date: 08/16/23 Room / Location: [address] / [facility] [address] [facility]    Anesthesia Start: 4560 Anesthesia Stop: 1034    Procedures:       EGD BIOPSY      COLONOSCOPY WITH BIOPSY Diagnosis:       Dysphagia, unspecified type      Chronic diarrhea      (Dysphagia, unspecified type [R13.10])      (Chronic diarrhea [K52.9])    Surgeons: Adan Crowder MD Responsible Provider: Anh Juárez MD    Anesthesia Type: MAC ASA Status: 3          Anesthesia Type: No value filed.     Ibis Phase I: Ibis Score: 10    Ibis Phase II: Ibis Score: 10      Anesthesia Post Evaluation    Patient location during evaluation: bedside  Level of consciousness: awake and alert  Airway patency: patent  Nausea & Vomiting: no vomiting  Complications: no  Cardiovascular status: blood pressure returned to baseline  Respiratory status: acceptable  Hydration status: euvolemic  Multimodal analgesia pain management approach  Pain management: adequate

## 2023-08-16 NOTE — PROCEDURES
EGD/COLONOSCOPY PROCEDURE NOTE:        Patient: Lyndsey Canales  : 1965  Acct#:     Procedure:   Esophagogastroduodenoscopy with biopsy, [place] dilation  Colonoscopy with biopsy        Date:  2023     Surgeon:  Robby Yarbrough MD,     Referring Physician:  Collin Kawasaki, MD      Preoperative Diagnosis:    61 Y/ female with history of chronic GERD, dyspepsia, intermittent esophageal dysphagia for EGD exam today. Patient  Patient is also here for colonoscopy with history of chronic diarrhea and intermittent rectal bleeding      Anesthesia: MAC anesthesia      Consent:  The patient or their legal guardian has signed an informed consent, and is aware of the potential risks, benefits, alternatives, and potential complications of this procedure. These include, but are not limited to hemorrhage, bleeding, post procedural pain, perforation, phlebitis, aspiration, hypotension, hypoxia, cardiovascular events such as arryhthmia, and possibly death. EGD PROCEDURE NOTE        Description of Procedure: The patient was then taken to the endoscopy suite, placed in the left lateral decubitus position and the above IV sedation was administrered. The Olympus video endoscope was placed through the patient's oropharynx without difficulty to the extent of the 2nd portion of the duodenum. The patient tolerated the procedure well and was taken to the post anesthesia care unit in good condition. Complications: None  EBL: none      Findings:  Esophagus:  Visualization of the esophagus demonstrated normal-appearing esophageal mucosa, to 2 [place]. Small mucosal tear seen just beyond the vocal cords post dilation suggestive of mild stricture at that area. Biopsies obtained from upper and midesophagus rule out eosinophilic esophagitis. 2 cm sliding hiatal hernia seen. GE junction at 38 cm  Stomach: The scope was then advanced into the stomach.   Both forward and retroflexed views of the stomach were

## 2023-08-16 NOTE — PROGRESS NOTES
Ambulatory Surgery/Procedure Discharge Note    Vitals:    08/16/23 1100   BP: 126/88   Pulse: 67   Resp: 18   Temp:    SpO2: 97%       In: 400 [I.V.:400]  Out: -     Restroom use offered before discharge. Yes    Pain assessment:  none  Pain Level: 0    Pt a&o x4. VSS. Pt denies pain and nausea. Reviewed d/c instructions and removed IV. Patient discharged to home/self care.  Patient discharged via wheel chair by transporter to waiting family/S.O.       8/16/2023 11:27 AM

## 2023-08-16 NOTE — H&P
Pre-operative History and Physical    Patient: Basim Deluna  : 1965  Acct#:     History Obtained From:  patient    HISTORY OF PRESENT ILLNESS:    The patient is a 61 y.o. female  who presents with chronic reflux, dyspepsia, intermittent esophageal dysphagia, chronic diarrhea, intermittent rectal bleeding    Past Medical History:        Diagnosis Date    Alcohol abuse, in remission     Anxiety state, unspecified     COVID     Depressive disorder, not elsewhere classified     Diffuse cystic mastopathy     Extrinsic asthma, unspecified     Hypertension     Leiomyoma of uterus, unspecified     Other and unspecified ovarian cyst      Past Surgical History:        Procedure Laterality Date    CHOLECYSTECTOMY      HYSTERECTOMY (CERVIX STATUS UNKNOWN)       Medications Prior to Admission:   No current facility-administered medications on file prior to encounter.      Current Outpatient Medications on File Prior to Encounter   Medication Sig Dispense Refill    azelastine (ASTELIN) 0.1 % nasal spray USE 1 SPRAY IN EACH NOSTRIL TWICE DAILY AS DIRECTED 60 mL 1    fluticasone (FLONASE) 50 MCG/ACT nasal spray TAKE 1 SPRAY BY EACH NOSTRIL DAILY 32 g 1    loratadine (CLARITIN) 10 MG tablet Take 1 tablet by mouth daily 30 tablet 3    montelukast (SINGULAIR) 10 MG tablet Take 1 tablet by mouth daily 30 tablet 3    ELIQUIS 5 MG TABS tablet TAKE 1 TABLET BY MOUTH TWICE DAILY 180 tablet 3    pantoprazole (PROTONIX) 40 MG tablet Take 1 tablet by mouth daily      zolpidem (AMBIEN) 10 MG tablet TAKE 1 TABLET BY MOUTH EVERY NIGHT AS NEEDED FOR SLEEP 30 tablet 5    lactobacillus (CULTURELLE) capsule Take 1 capsule by mouth daily 10 capsule 0    ondansetron (ZOFRAN) 4 MG tablet Take 1 tablet by mouth every 8 hours as needed for Nausea or Vomiting 15 tablet 0    torsemide (DEMADEX) 20 MG tablet Take 1 tablet by mouth daily as needed      hydrALAZINE (APRESOLINE) 10 MG tablet Take 1 tablet by mouth 3 times daily as needed

## 2023-08-21 ENCOUNTER — TELEPHONE (OUTPATIENT)
Dept: SURGERY | Age: 58
End: 2023-08-21

## 2023-08-21 DIAGNOSIS — J45.20 INTERMITTENT ASTHMA WITHOUT COMPLICATION, UNSPECIFIED ASTHMA SEVERITY: ICD-10-CM

## 2023-08-21 RX ORDER — TORSEMIDE 20 MG/1
TABLET ORAL
Qty: 30 TABLET | Refills: 5 | Status: SHIPPED | OUTPATIENT
Start: 2023-08-21

## 2023-08-21 RX ORDER — ALBUTEROL SULFATE 90 UG/1
AEROSOL, METERED RESPIRATORY (INHALATION)
Qty: 6.7 G | Refills: 1 | OUTPATIENT
Start: 2023-08-21

## 2023-08-23 ENCOUNTER — TELEPHONE (OUTPATIENT)
Dept: SURGERY | Age: 58
End: 2023-08-23

## 2023-08-23 NOTE — TELEPHONE ENCOUNTER
Patient is not having surgery, she was supposed to call for a RBL in May after her office visit. Patient spoke with our front staff and said she was hemorrhaging, which would mean she needs to go to ER. Patient is just now following up for RBL procedure. If this problem has persisted, changed, and gotten worse, she needs a consult again to ensure something has not changed. A RBL may not be the answer at this point? ??
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
## 2023-08-24 ENCOUNTER — TELEPHONE (OUTPATIENT)
Dept: INTERNAL MEDICINE CLINIC | Age: 58
End: 2023-08-24

## 2023-08-24 NOTE — TELEPHONE ENCOUNTER
Patient is asking if Dr. Tyson Pennington thinks it is a good idea or not for her to start working? 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 States she has a low immune system and wants to know what he thinks about her starting with coivd being back on the rise?       Please call and advise [phone]

## 2023-08-25 NOTE — TELEPHONE ENCOUNTER
Pt called in again and stated that Dr. Ranjith Srivastava did not answer all her questions. PT stated she needs to know if she should get the RSV vaccine and the COVID booster. Pt wants to know if this is the same strain as what the paxlovid was being used for. Pt states she is not getting a clear answer on her questions. Please call and advise.

## 2023-08-26 NOTE — TELEPHONE ENCOUNTER
I don't have any opinion on whether she seeks employment or not. That is up to her and her rheumatologist.  She should get the upcoming new monovalent Covid Vaccine. It will contain only strains of Omicron. It should be available in late September. We do not give the vaccine in office. I would recommend the RSV vaccine for her as well but currently that is not available to us in office either. If she needs to discuss further with me she will need to make an appointment.

## 2023-08-29 ENCOUNTER — OFFICE VISIT (OUTPATIENT)
Dept: SURGERY | Age: 58
End: 2023-08-29
Payer: OTHER GOVERNMENT

## 2023-08-29 VITALS
OXYGEN SATURATION: 95 % | HEART RATE: 77 BPM | TEMPERATURE: 97.6 F | SYSTOLIC BLOOD PRESSURE: 125 MMHG | BODY MASS INDEX: 24.37 KG/M2 | DIASTOLIC BLOOD PRESSURE: 82 MMHG | HEIGHT: 69 IN | RESPIRATION RATE: 16 BRPM

## 2023-08-29 DIAGNOSIS — K64.2 GRADE III HEMORRHOIDS: Primary | ICD-10-CM

## 2023-08-29 DIAGNOSIS — K62.5 RECTAL BLEEDING: ICD-10-CM

## 2023-08-29 PROCEDURE — 3074F SYST BP LT 130 MM HG: CPT | Performed by: SURGERY

## 2023-08-29 PROCEDURE — 3079F DIAST BP 80-89 MM HG: CPT | Performed by: SURGERY

## 2023-08-29 PROCEDURE — 99213 OFFICE O/P EST LOW 20 MIN: CPT | Performed by: SURGERY

## 2023-08-29 NOTE — PROGRESS NOTES
[address] [facility] COLORECTAL SURGERY  [facility] [facility] [address]  Dept: [phone]  Dept Fax: [phone]  Loc: [phone]    Visit Date: 8/29/2023    Maine Garcia is a 61 y.o. female who presents today for: Follow-up (Patient last seen May 2023-Patient states she just had a colonoscopy with Dr. France Tapia on 8/16, she reports rectal bleeding comes and goes, pain is currently an 8/10, she reports having puss, she has both constipation and diarrhea but mostly diarrhea )      HPI:       Maine Garcia is a 61 y.o. female known to me for recent visit in May of this year for rectal bleeding. She recently had a colonoscopy and EGD with Dr. France Tapia and she was noted to only have hemorrhoids on exam.    She is back today in the office with intermittent bleeding and discomfort. She also has issues with constipation and diarrhea. She is on numerous medications for her rheumatoid arthritis.     Past Medical History:   Diagnosis Date    Alcohol abuse, in remission     Anxiety state, unspecified     COVID     Depressive disorder, not elsewhere classified     Diffuse cystic mastopathy     Extrinsic asthma, unspecified     Hypertension     Leiomyoma of uterus, unspecified     Other and unspecified ovarian cyst      Past Surgical History:   Procedure Laterality Date    CHOLECYSTECTOMY      COLONOSCOPY N/A 8/16/2023    COLONOSCOPY WITH BIOPSY performed by Sabina Valadez MD at [address] [place] [address] ([facility])      UPPER GASTROINTESTINAL ENDOSCOPY N/A 8/16/2023    EGD BIOPSY performed by Sabina Valadez MD at [facility] ENDOSCOPY       Current Outpatient Medications:     torsemide (DEMADEX) 20 MG tablet, TAKE 1 TABLET BY MOUTH DAILY, Disp: 30 tablet, Rfl: 5    ALPRAZolam (XANAX) 2 MG tablet, TAKE 1/2 TO 1 TABLET BY MOUTH THREE TIMES DAILY AS NEEDED FOR ANXIETY, Disp: 90 tablet, Rfl: 0    albuterol sulfate HFA

## 2023-08-31 DIAGNOSIS — F41.9 ANXIETY: ICD-10-CM

## 2023-08-31 RX ORDER — ALPRAZOLAM 2 MG/1
TABLET ORAL
Qty: 90 TABLET | Refills: 0 | Status: SHIPPED | OUTPATIENT
Start: 2023-08-31 | End: 2023-09-30

## 2023-09-07 ENCOUNTER — TELEPHONE (OUTPATIENT)
Dept: CARDIOLOGY CLINIC | Age: 58
End: 2023-09-07

## 2023-09-07 NOTE — TELEPHONE ENCOUNTER
Patient called to ask Dr. Kar Haney if it would be okay for her to hold her eliquis for three days before she has rubber band ligation on 9/20/23.  [phone]

## 2023-09-11 ENCOUNTER — HOSPITAL ENCOUNTER (OUTPATIENT)
Dept: MAMMOGRAPHY | Age: 58
Discharge: HOME | End: 2023-09-11
Payer: OTHER GOVERNMENT

## 2023-09-11 VITALS — BODY MASS INDEX: 25.18 KG/M2 | HEIGHT: 69 IN | WEIGHT: 170 LBS

## 2023-09-11 DIAGNOSIS — Z12.31 VISIT FOR SCREENING MAMMOGRAM: ICD-10-CM

## 2023-09-11 PROCEDURE — 77063 BREAST TOMOSYNTHESIS BI: CPT

## 2023-09-11 NOTE — TELEPHONE ENCOUNTER
Per Dr.Whang thakur to hold Eliquis 3 days prior to procedure. Left message on machine to call back.

## 2023-09-18 ENCOUNTER — TELEPHONE (OUTPATIENT)
Dept: SURGERY | Age: 58
End: 2023-09-18

## 2023-09-18 NOTE — TELEPHONE ENCOUNTER
Pt called and is scheduled for banding on Wednesday, 9/20/23. She has a history of hemorrhaging which she is experiencing now. She is not sure if she can proceed with banding on Wednesday. Please advise if pt is okay to proceed with banding or needs to wait. Pt contact [phone].

## 2023-09-18 NOTE — TELEPHONE ENCOUNTER
Patient will come in for office visit on Wednesday, she has been off EliClick4Careis since this past weekend.

## 2023-09-20 ENCOUNTER — OFFICE VISIT (OUTPATIENT)
Dept: SURGERY | Age: 58
End: 2023-09-20
Payer: OTHER GOVERNMENT

## 2023-09-20 VITALS
RESPIRATION RATE: 16 BRPM | OXYGEN SATURATION: 94 % | BODY MASS INDEX: 25.33 KG/M2 | SYSTOLIC BLOOD PRESSURE: 115 MMHG | TEMPERATURE: 97.1 F | HEART RATE: 74 BPM | WEIGHT: 171 LBS | DIASTOLIC BLOOD PRESSURE: 76 MMHG | HEIGHT: 69 IN

## 2023-09-20 DIAGNOSIS — K62.5 RECTAL BLEEDING: ICD-10-CM

## 2023-09-20 DIAGNOSIS — K64.2 GRADE III HEMORRHOIDS: Primary | ICD-10-CM

## 2023-09-20 PROCEDURE — 99024 POSTOP FOLLOW-UP VISIT: CPT | Performed by: SURGERY

## 2023-09-20 PROCEDURE — 46221 LIGATION OF HEMORRHOID(S): CPT | Performed by: SURGERY

## 2023-09-20 NOTE — PROGRESS NOTES
INTERNAL HEMORRHOID RUBBER BAND LIGATION PROCEDURE NOTE:    Patient presented with symptomatic internal hemorrhoids unresponsive to conservative management. See previous office notes for details of previous history and treatments. Risks of rubber band ligation explained to patient, including but not limited to: immediate and delayed bleeding, pain, infection, external hemorrhoids thrombosis, pelvic sepsis, urinary retention, sphincter dysfunction, need for additional procedures, and recurrence. Patient was previously provided with information in office AVS (after visit summary) and given opportunity to ask any questions and bring up any concerns. Chaperone/MA present in room during entire procedure. Patient was placed in either lateral or knee-chest positioning depending on patient preference. Exam table manipulated for proper visualization and lighting. Buttocks spread. Digital exam and anoscopy performed confirming internal hemorrhoids. Suction rubber band ligator used to place band in 2 positions, 1 cm proximal to the dentate line, at the apex of the internal hemorrhoid. Anoscope removed. Patient tolerated the procedure well without complication. EBL minimal. Post procedure expectations and instructions explained to patient. Written instructions provided as well. Disposition: Follow up in 4 weeks for symptom reassessment.     Electronically signed by Taina Padgett MD on 9/20/2023 at 2:22 PM

## 2023-09-25 RX ORDER — POTASSIUM CHLORIDE 20 MEQ/1
TABLET, EXTENDED RELEASE ORAL
Qty: 60 TABLET | Refills: 5 | Status: SHIPPED | OUTPATIENT
Start: 2023-09-25

## 2023-09-27 ENCOUNTER — TELEPHONE (OUTPATIENT)
Dept: SURGERY | Age: 58
End: 2023-09-27

## 2023-09-27 NOTE — TELEPHONE ENCOUNTER
Spoke with pt who informs banding was done on 9/20/23 not 9/26/23. Advised pt banding sometimes needs repeated. She was scheduled for another banding on 10/17/23. Fleet enema was placed at the  for her to  Monday, 10/2/23.

## 2023-09-27 NOTE — TELEPHONE ENCOUNTER
Patient called in stating that she had an RBL yesterday, 9/26/2023, and she noticed a little bit of blood when using the bathroom     Please contact the patient at [phone]

## 2023-09-28 ENCOUNTER — TELEPHONE (OUTPATIENT)
Dept: INTERNAL MEDICINE CLINIC | Age: 58
End: 2023-09-28

## 2023-09-28 DIAGNOSIS — F41.9 ANXIETY: ICD-10-CM

## 2023-09-28 RX ORDER — ALPRAZOLAM 2 MG/1
TABLET ORAL
Qty: 90 TABLET | Refills: 0 | Status: SHIPPED | OUTPATIENT
Start: 2023-09-28 | End: 2023-10-28

## 2023-09-28 NOTE — TELEPHONE ENCOUNTER
Sorry she is feeling so bad. Maybe she should go to an emergency department. It does not sound like a problem I can handle from the primary care clinic.

## 2023-09-28 NOTE — TELEPHONE ENCOUNTER
Patient states she got the covid shot on Monday and has been getting sicker and sicker ever since. States she has bad cramping, diarrhea, nauseous and potassium is really low. Also states she could go into Afib if this keeps going on for to much longer. Wants to know what Dr. Rhiannon Blakely recommends she do.     Please call and advise [phone]

## 2023-10-04 NOTE — TELEPHONE ENCOUNTER
Requested Prescriptions     Pending Prescriptions Disp Refills    metoprolol tartrate (LOPRESSOR) 25 MG tablet [Pharmacy Med Name: METOPROLOL TARTRATE 25MG TABLETS] 360 tablet 3     Sig: TAKE 2 TABLETS(50 MG) BY MOUTH TWICE DAILY          Last Office Visit: 6/27/2022     Next Office Visit: [phone]

## 2023-10-19 DIAGNOSIS — J30.9 ALLERGIC RHINITIS, UNSPECIFIED SEASONALITY, UNSPECIFIED TRIGGER: ICD-10-CM

## 2023-10-19 RX ORDER — LORATADINE 10 MG/1
10 TABLET ORAL DAILY
Qty: 30 TABLET | Refills: 3 | Status: SHIPPED | OUTPATIENT
Start: 2023-10-19

## 2023-10-25 DIAGNOSIS — F41.9 ANXIETY: ICD-10-CM

## 2023-10-25 DIAGNOSIS — F51.04 CHRONIC INSOMNIA: ICD-10-CM

## 2023-10-25 RX ORDER — ZOLPIDEM TARTRATE 10 MG/1
TABLET ORAL
Qty: 30 TABLET | Refills: 2 | Status: SHIPPED | OUTPATIENT
Start: 2023-10-25 | End: 2024-01-25

## 2023-10-25 RX ORDER — ALPRAZOLAM 2 MG/1
TABLET ORAL
Qty: 90 TABLET | Refills: 0 | Status: SHIPPED | OUTPATIENT
Start: 2023-10-25 | End: 2023-11-25

## 2023-10-25 NOTE — TELEPHONE ENCOUNTER
Requested Prescriptions     Pending Prescriptions Disp Refills    zolpidem (AMBIEN) 10 MG tablet [Pharmacy Med Name: ZOLPIDEM 10MG TABLETS] 30 tablet      Sig: TAKE 1 TABLET BY MOUTH EVERY NIGHT AS NEEDED FOR SLEEP    ALPRAZolam (XANAX) 2 MG tablet [Pharmacy Med Name: ALPRAZOLAM 2MG TABLETS] 90 tablet      Sig: TAKE 1/2 TO 1 TABLET BY MOUTH THREE TIMES DAILY AS NEEDED FOR ANXIETY     Last OV - 7/19/23  Next OV - 11/27/23  Last refill - 4/10/23, 9/28/23  Last labs - 6/21/23

## 2023-10-30 ENCOUNTER — OFFICE VISIT (OUTPATIENT)
Dept: CARDIOLOGY CLINIC | Age: 58
End: 2023-10-30
Payer: OTHER GOVERNMENT

## 2023-10-30 VITALS
HEART RATE: 70 BPM | DIASTOLIC BLOOD PRESSURE: 72 MMHG | WEIGHT: 184 LBS | SYSTOLIC BLOOD PRESSURE: 118 MMHG | BODY MASS INDEX: 27.17 KG/M2

## 2023-10-30 DIAGNOSIS — I48.0 PAROXYSMAL ATRIAL FIBRILLATION (HCC): Primary | ICD-10-CM

## 2023-10-30 DIAGNOSIS — R94.31 ABNORMAL EKG: ICD-10-CM

## 2023-10-30 DIAGNOSIS — I10 ESSENTIAL HYPERTENSION: ICD-10-CM

## 2023-10-30 PROCEDURE — 99214 OFFICE O/P EST MOD 30 MIN: CPT | Performed by: INTERNAL MEDICINE

## 2023-10-30 PROCEDURE — 3078F DIAST BP <80 MM HG: CPT | Performed by: INTERNAL MEDICINE

## 2023-10-30 PROCEDURE — 3074F SYST BP LT 130 MM HG: CPT | Performed by: INTERNAL MEDICINE

## 2023-10-30 ASSESSMENT — ENCOUNTER SYMPTOMS
CHEST TIGHTNESS: 0
CHOKING: 0
SHORTNESS OF BREATH: 0
COUGH: 0
ABDOMINAL PAIN: 0

## 2023-10-30 NOTE — PROGRESS NOTES
Subjective:      Patient ID: Kamron Aguirre is a 60 y.o. female. Here for follow up afib/htn/abn ekg. Arthritic pain from COVID booster. From RA also. Fatigue. Not real active. No exertional.  No syncope. No pnd or orthopnea. Breathing stable. Rhythm stable. bp reasonable. Past Medical History:   Diagnosis Date    Alcohol abuse, in remission     Anxiety state, unspecified     COVID     Depressive disorder, not elsewhere classified     Diffuse cystic mastopathy     Extrinsic asthma, unspecified     Hypertension     Leiomyoma of uterus, unspecified     Other and unspecified ovarian cyst      Past Surgical History:   Procedure Laterality Date    CHOLECYSTECTOMY      COLONOSCOPY N/A 8/16/2023    COLONOSCOPY WITH BIOPSY performed by Penelope Hill MD at [address] [facility] (CERVIX STATUS UNKNOWN)      UPPER GASTROINTESTINAL ENDOSCOPY N/A 8/16/2023    EGD BIOPSY performed by Penelope Hill MD at [address] [facility] History    Marital status:      Spouse name: Not on file    Number of children: Not on file    Years of education: Not on file    Highest education level: Not on file   Occupational History    Not on file   Tobacco Use    Smoking status: Never     Passive exposure: Never    Smokeless tobacco: Never   Substance and Sexual Activity    Alcohol use:  Yes     Alcohol/week: 1.0 - 2.0 standard drink of alcohol     Types: 1 - 2 Glasses of wine per week     Comment: 2 drinks qd    Drug use: No    Sexual activity: Yes     Partners: Male   Other Topics Concern    Not on file   Social History Narrative    Not on file     Social Determinants of Health     Financial Resource Strain: Low Risk  (4/19/2023)    Overall Financial Resource Strain (CARDIA)     Difficulty of Paying Living Expenses: Not hard at all   Food Insecurity: No Food Insecurity (4/19/2023)    Hunger Vital Sign     Worried About Running Out of Food in the Last Year: Never true

## 2023-11-18 DIAGNOSIS — J30.9 ALLERGIC RHINITIS, UNSPECIFIED SEASONALITY, UNSPECIFIED TRIGGER: ICD-10-CM

## 2023-11-21 RX ORDER — MONTELUKAST SODIUM 10 MG/1
10 TABLET ORAL DAILY
Qty: 30 TABLET | Refills: 3 | Status: SHIPPED | OUTPATIENT
Start: 2023-11-21

## 2023-11-22 DIAGNOSIS — F41.9 ANXIETY: ICD-10-CM

## 2023-11-22 RX ORDER — ALPRAZOLAM 2 MG/1
TABLET ORAL
Qty: 90 TABLET | Refills: 0 | Status: SHIPPED | OUTPATIENT
Start: 2023-11-22 | End: 2023-12-22

## 2023-11-27 ENCOUNTER — OFFICE VISIT (OUTPATIENT)
Dept: INTERNAL MEDICINE CLINIC | Age: 58
End: 2023-11-27
Payer: OTHER GOVERNMENT

## 2023-11-27 VITALS
SYSTOLIC BLOOD PRESSURE: 122 MMHG | HEART RATE: 65 BPM | OXYGEN SATURATION: 95 % | TEMPERATURE: 98.4 F | DIASTOLIC BLOOD PRESSURE: 80 MMHG | WEIGHT: 184 LBS | HEIGHT: 69 IN | BODY MASS INDEX: 27.25 KG/M2

## 2023-11-27 DIAGNOSIS — F51.01 PRIMARY INSOMNIA: ICD-10-CM

## 2023-11-27 DIAGNOSIS — I10 ESSENTIAL HYPERTENSION: ICD-10-CM

## 2023-11-27 DIAGNOSIS — Z00.00 ENCOUNTER FOR WELL ADULT EXAM WITHOUT ABNORMAL FINDINGS: ICD-10-CM

## 2023-11-27 DIAGNOSIS — Z00.00 ENCOUNTER FOR WELL ADULT EXAM WITHOUT ABNORMAL FINDINGS: Primary | ICD-10-CM

## 2023-11-27 DIAGNOSIS — M06.09 RHEUMATOID ARTHRITIS OF MULTIPLE SITES WITH NEGATIVE RHEUMATOID FACTOR (HCC): ICD-10-CM

## 2023-11-27 DIAGNOSIS — I48.0 PAROXYSMAL ATRIAL FIBRILLATION (HCC): ICD-10-CM

## 2023-11-27 PROCEDURE — 99396 PREV VISIT EST AGE 40-64: CPT | Performed by: HOSPITALIST

## 2023-11-27 PROCEDURE — 3079F DIAST BP 80-89 MM HG: CPT | Performed by: HOSPITALIST

## 2023-11-27 PROCEDURE — 99214 OFFICE O/P EST MOD 30 MIN: CPT | Performed by: HOSPITALIST

## 2023-11-27 PROCEDURE — 3074F SYST BP LT 130 MM HG: CPT | Performed by: HOSPITALIST

## 2023-11-27 ASSESSMENT — ENCOUNTER SYMPTOMS
SINUS PAIN: 0
TROUBLE SWALLOWING: 0
CONSTIPATION: 0
ABDOMINAL PAIN: 0
BACK PAIN: 0
COUGH: 0
VOMITING: 0
DIARRHEA: 0
NAUSEA: 0
SORE THROAT: 0
BLOOD IN STOOL: 0
SINUS PRESSURE: 0
SHORTNESS OF BREATH: 0
WHEEZING: 0
ABDOMINAL DISTENTION: 0
CHEST TIGHTNESS: 0
VOICE CHANGE: 0

## 2023-11-27 NOTE — PROGRESS NOTES
pain, palpitations and leg swelling. Gastrointestinal:  Negative for abdominal distention, abdominal pain, blood in stool, constipation, diarrhea, nausea and vomiting. Endocrine: Negative for polydipsia and polyphagia. Genitourinary:  Negative for decreased urine volume, difficulty urinating, dysuria and urgency. Musculoskeletal:  Positive for arthralgias. Negative for back pain, gait problem, joint swelling and myalgias. Neurological:  Negative for dizziness, seizures, syncope, light-headedness and headaches. Psychiatric/Behavioral:  Positive for dysphoric mood and sleep disturbance. Negative for agitation, behavioral problems, confusion and suicidal ideas. The patient is nervous/anxious. Allergies   Allergen Reactions    Amlodipine Other (See Comments)     edema    Bystolic [Nebivolol Hcl] Other (See Comments)     Hair falling out    Demerol Hives    Dilaudid [Hydromorphone Hcl] Hives    Losartan Other (See Comments)     Possible arthralgias    Penicillins     Spironolactone Other (See Comments) and Hives     ? Sulfa Antibiotics     Adhesive Tape Other (See Comments)     BURNED SKIN  BURNED SKIN  BURNED SKIN      Iodides Nausea And Vomiting and Other (See Comments)     Radioactive dye, sever h/a high b/p         Prior to Visit Medications    Medication Sig Taking?  Authorizing Provider   ALPRAZolam (XANAX) 2 MG tablet TAKE 1/2 TO 1 TABLET BY MOUTH THREE TIMES DAILY AS NEEDED FOR ANXIETY Yes Wayne Lobo MD   montelukast (SINGULAIR) 10 MG tablet TAKE 1 TABLET BY MOUTH DAILY Yes Enrique Coffey DO   zolpidem (AMBIEN) 10 MG tablet TAKE 1 TABLET BY MOUTH EVERY NIGHT AS NEEDED FOR SLEEP Yes Wayne Lobo MD   loratadine (CLARITIN) 10 MG tablet TAKE 1 TABLET BY MOUTH DAILY Yes Enrique Coffey DO   metoprolol tartrate (LOPRESSOR) 25 MG tablet TAKE 2 TABLETS(50 MG) BY MOUTH TWICE DAILY Yes Canelo Morning, APRN - CNP   potassium chloride (KLOR-CON M) 20 MEQ extended release tablet TAKE 1

## 2023-11-28 LAB
ALBUMIN SERPL-MCNC: 4.8 G/DL (ref 3.4–5)
ALBUMIN/GLOB SERPL: 1.9 {RATIO} (ref 1.1–2.2)
ALP SERPL-CCNC: 59 U/L (ref 40–129)
ALT SERPL-CCNC: 17 U/L (ref 10–40)
ANION GAP SERPL CALCULATED.3IONS-SCNC: 14 MMOL/L (ref 3–16)
AST SERPL-CCNC: 19 U/L (ref 15–37)
BILIRUB SERPL-MCNC: 0.6 MG/DL (ref 0–1)
BUN SERPL-MCNC: 23 MG/DL (ref 7–20)
CALCIUM SERPL-MCNC: 10 MG/DL (ref 8.3–10.6)
CHLORIDE SERPL-SCNC: 98 MMOL/L (ref 99–110)
CHOLEST SERPL-MCNC: 240 MG/DL (ref 0–199)
CO2 SERPL-SCNC: 26 MMOL/L (ref 21–32)
CREAT SERPL-MCNC: 0.9 MG/DL (ref 0.6–1.1)
DEPRECATED RDW RBC AUTO: 13.9 % (ref 12.4–15.4)
EST. AVERAGE GLUCOSE BLD GHB EST-MCNC: 99.7 MG/DL
GFR SERPLBLD CREATININE-BSD FMLA CKD-EPI: >60 ML/MIN/{1.73_M2}
GLUCOSE SERPL-MCNC: 111 MG/DL (ref 70–99)
HBA1C MFR BLD: 5.1 %
HCT VFR BLD AUTO: 42.6 % (ref 36–48)
HDLC SERPL-MCNC: 86 MG/DL (ref 40–60)
HGB BLD-MCNC: 15.2 G/DL (ref 12–16)
HIV 1+2 AB+HIV1 P24 AG SERPL QL IA: NORMAL
HIV 2 AB SERPL QL IA: NORMAL
HIV1 AB SERPL QL IA: NORMAL
HIV1 P24 AG SERPL QL IA: NORMAL
LDL CHOLESTEROL CALCULATED: 118 MG/DL
MCH RBC QN AUTO: 34.9 PG (ref 26–34)
MCHC RBC AUTO-ENTMCNC: 35.5 G/DL (ref 31–36)
MCV RBC AUTO: 98.1 FL (ref 80–100)
PLATELET # BLD AUTO: 242 K/UL (ref 135–450)
PMV BLD AUTO: 8 FL (ref 5–10.5)
POTASSIUM SERPL-SCNC: 3.7 MMOL/L (ref 3.5–5.1)
PROT SERPL-MCNC: 7.3 G/DL (ref 6.4–8.2)
RBC # BLD AUTO: 4.34 M/UL (ref 4–5.2)
SODIUM SERPL-SCNC: 138 MMOL/L (ref 136–145)
TRIGL SERPL-MCNC: 179 MG/DL (ref 0–150)
VLDLC SERPL CALC-MCNC: 36 MG/DL
WBC # BLD AUTO: 11.1 K/UL (ref 4–11)

## 2023-12-01 RX ORDER — FLECAINIDE ACETATE 50 MG/1
50 TABLET ORAL 2 TIMES DAILY
Qty: 180 TABLET | Refills: 3 | Status: SHIPPED | OUTPATIENT
Start: 2023-12-01

## 2024-01-17 DIAGNOSIS — F51.04 CHRONIC INSOMNIA: ICD-10-CM

## 2024-01-17 DIAGNOSIS — F41.9 ANXIETY: ICD-10-CM

## 2024-01-18 RX ORDER — ALPRAZOLAM 2 MG/1
TABLET ORAL
Qty: 90 TABLET | Refills: 2 | Status: SHIPPED | OUTPATIENT
Start: 2024-01-18 | End: 2024-04-18

## 2024-01-18 RX ORDER — ZOLPIDEM TARTRATE 10 MG/1
TABLET ORAL
Qty: 30 TABLET | Refills: 2 | Status: SHIPPED | OUTPATIENT
Start: 2024-01-18 | End: 2024-04-18

## 2024-01-18 NOTE — TELEPHONE ENCOUNTER
Patient is following up on her refill request from 01/17/24.    She would like to speak to Courtney.

## 2024-02-05 ENCOUNTER — OFFICE VISIT (OUTPATIENT)
Dept: CARDIOLOGY CLINIC | Age: 59
End: 2024-02-05
Payer: OTHER GOVERNMENT

## 2024-02-05 VITALS
HEART RATE: 76 BPM | BODY MASS INDEX: 28.5 KG/M2 | WEIGHT: 193 LBS | DIASTOLIC BLOOD PRESSURE: 70 MMHG | SYSTOLIC BLOOD PRESSURE: 126 MMHG

## 2024-02-05 DIAGNOSIS — R94.31 ABNORMAL EKG: ICD-10-CM

## 2024-02-05 DIAGNOSIS — I10 ESSENTIAL HYPERTENSION: ICD-10-CM

## 2024-02-05 DIAGNOSIS — I48.0 PAROXYSMAL ATRIAL FIBRILLATION (HCC): Primary | ICD-10-CM

## 2024-02-05 PROCEDURE — 3078F DIAST BP <80 MM HG: CPT | Performed by: INTERNAL MEDICINE

## 2024-02-05 PROCEDURE — 3074F SYST BP LT 130 MM HG: CPT | Performed by: INTERNAL MEDICINE

## 2024-02-05 PROCEDURE — 99214 OFFICE O/P EST MOD 30 MIN: CPT | Performed by: INTERNAL MEDICINE

## 2024-02-05 ASSESSMENT — ENCOUNTER SYMPTOMS
COUGH: 0
SHORTNESS OF BREATH: 0
CHEST TIGHTNESS: 0
ABDOMINAL PAIN: 0
CHOKING: 0

## 2024-02-05 NOTE — PROGRESS NOTES
Subjective:      Patient ID: rEicka Eubanks is a 58 y.o. female.    Here for follow up afib/htn/abn ekg.  Sick in December and January. Fatigue.  Not real active.  No exertional.  No syncope.  No pnd or orthopnea.   Breathing stable.   Rhythm stable.  bp reasonable.      Past Medical History:   Diagnosis Date    Alcohol abuse, in remission     Anxiety state, unspecified     COVID     Depressive disorder, not elsewhere classified     Diffuse cystic mastopathy     Extrinsic asthma, unspecified     Hypertension     Leiomyoma of uterus, unspecified     Other and unspecified ovarian cyst      Past Surgical History:   Procedure Laterality Date    CHOLECYSTECTOMY      COLONOSCOPY N/A 8/16/2023    COLONOSCOPY WITH BIOPSY performed by Mike Castillo MD at [facility] ENDOSCOPY    HYSTERECTOMY (CERVIX STATUS UNKNOWN)      UPPER GASTROINTESTINAL ENDOSCOPY N/A 8/16/2023    EGD BIOPSY performed by Mike Castillo MD at [facility] ENDOSCOPY     Social History     Socioeconomic History    Marital status:      Spouse name: Not on file    Number of children: Not on file    Years of education: Not on file    Highest education level: Not on file   Occupational History    Not on file   Tobacco Use    Smoking status: Never     Passive exposure: Never    Smokeless tobacco: Never   Substance and Sexual Activity    Alcohol use: Yes     Alcohol/week: 1.0 - 2.0 standard drink of alcohol     Types: 1 - 2 Glasses of wine per week     Comment: 2 drinks qd    Drug use: No    Sexual activity: Yes     Partners: Male   Other Topics Concern    Not on file   Social History Narrative    Not on file     Social Determinants of Health     Financial Resource Strain: Low Risk  (4/19/2023)    Overall Financial Resource Strain (CARDIA)     Difficulty of Paying Living Expenses: Not hard at all   Food Insecurity: Not on file (4/19/2023)   Transportation Needs: Unknown (4/19/2023)    PRAPARE - Transportation     Lack of Transportation (Medical): Not on file

## 2024-02-16 DIAGNOSIS — J30.9 ALLERGIC RHINITIS, UNSPECIFIED SEASONALITY, UNSPECIFIED TRIGGER: ICD-10-CM

## 2024-02-20 RX ORDER — LORATADINE 10 MG/1
10 TABLET ORAL DAILY
Qty: 30 TABLET | Refills: 3 | Status: SHIPPED | OUTPATIENT
Start: 2024-02-20

## 2024-02-27 DIAGNOSIS — J45.20 INTERMITTENT ASTHMA WITHOUT COMPLICATION, UNSPECIFIED ASTHMA SEVERITY: ICD-10-CM

## 2024-02-27 RX ORDER — FLUTICASONE PROPIONATE AND SALMETEROL XINAFOATE 115; 21 UG/1; UG/1
AEROSOL, METERED RESPIRATORY (INHALATION)
Qty: 12 G | Refills: 5 | Status: SHIPPED | OUTPATIENT
Start: 2024-02-27

## 2024-02-29 ENCOUNTER — TELEPHONE (OUTPATIENT)
Dept: CARDIOLOGY CLINIC | Age: 59
End: 2024-02-29

## 2024-02-29 RX ORDER — TORSEMIDE 20 MG/1
20 TABLET ORAL DAILY
Qty: 90 TABLET | Refills: 3 | Status: SHIPPED | OUTPATIENT
Start: 2024-02-29

## 2024-02-29 RX ORDER — TORSEMIDE 20 MG/1
TABLET ORAL
Qty: 30 TABLET | Refills: 5 | OUTPATIENT
Start: 2024-02-29

## 2024-02-29 NOTE — TELEPHONE ENCOUNTER
Patient called stating she lost her medication and would like a refill.    Medication  torsemide (DEMADEX) 20 MG tablet [[id number]]  torsemide (DEMADEX) 20 MG tablet [[id number]]    Order Details  Dose, Route, Frequency: As Directed   Dispense Quantity: 30 tablet Refills: 5          Sig: TAKE 1 TABLET BY MOUTH DAILY     Pharmacy  [facility] #[id number] [place], [place] - [address] [phone] -  [phone]  [address] [facility] [address]  Phone: [phone]  Fax: [phone]     Last visit: 2/5/24  Next visit: 5/6/24  Refill: 8/21/23  Last labs: 11/27/23

## 2024-02-29 NOTE — TELEPHONE ENCOUNTER
Patient called stating she lost her medication and would like a refill.    Medication  torsemide (DEMADEX) 20 MG tablet [[id number]]  torsemide (DEMADEX) 20 MG tablet [[id number]]    Order Details  Dose, Route, Frequency: As Directed   Dispense Quantity: 30 tablet Refills: 5          Sig: TAKE 1 TABLET BY MOUTH DAILY     Pharmacy  [facility] #[id number] [place], [place] [address] [facility] [phone] -  [phone]  [address] [facility] [address]  Phone: [phone]  Fax: [phone]     Last visit: 2/5/24  Next visit: 5/6/24  Refill: 8/21/23  Last labs: 11/27/23

## 2024-03-17 DIAGNOSIS — J30.9 ALLERGIC RHINITIS, UNSPECIFIED SEASONALITY, UNSPECIFIED TRIGGER: ICD-10-CM

## 2024-03-17 RX ORDER — MONTELUKAST SODIUM 10 MG/1
10 TABLET ORAL DAILY
Qty: 30 TABLET | Refills: 3 | Status: SHIPPED | OUTPATIENT
Start: 2024-03-17

## 2024-03-20 RX ORDER — POTASSIUM CHLORIDE 20 MEQ/1
TABLET, EXTENDED RELEASE ORAL
Qty: 60 TABLET | Refills: 1 | Status: SHIPPED | OUTPATIENT
Start: 2024-03-20

## 2024-04-04 DIAGNOSIS — F41.9 ANXIETY: ICD-10-CM

## 2024-04-04 RX ORDER — ALPRAZOLAM 2 MG/1
TABLET ORAL
Qty: 90 TABLET | Refills: 0 | Status: SHIPPED | OUTPATIENT
Start: 2024-04-04 | End: 2024-07-04

## 2024-04-04 NOTE — TELEPHONE ENCOUNTER
Rui from [facility] called to request this refill for the patient  Please send to the pharmacy below     ALPRAZolam (XANAX) 2 MG tablet    [id number] [address] [facility] [phone] - F [phone]

## 2024-04-09 DIAGNOSIS — F51.04 CHRONIC INSOMNIA: ICD-10-CM

## 2024-04-09 DIAGNOSIS — F41.9 ANXIETY: ICD-10-CM

## 2024-04-11 DIAGNOSIS — F41.9 ANXIETY: ICD-10-CM

## 2024-04-11 RX ORDER — ALPRAZOLAM 2 MG/1
TABLET ORAL
Qty: 90 TABLET | Refills: 0 | Status: SHIPPED | OUTPATIENT
Start: 2024-04-11 | End: 2024-07-11

## 2024-04-11 RX ORDER — ALPRAZOLAM 2 MG/1
TABLET ORAL
Qty: 90 TABLET | OUTPATIENT
Start: 2024-04-11

## 2024-04-11 RX ORDER — ZOLPIDEM TARTRATE 10 MG/1
TABLET ORAL
Qty: 30 TABLET | Refills: 2 | Status: SHIPPED | OUTPATIENT
Start: 2024-04-11 | End: 2024-05-11

## 2024-04-11 NOTE — TELEPHONE ENCOUNTER
Carola from [facility] pharmacy is requesting a refill on both medications below. She is aware that the Alprazolam went to the [facility] on [address] but Carola can not pull gaviota over from them and needs a new order sent over to them.         zolpidem (AMBIEN) 10 MG tablet [[id number]     ALPRAZolam (XANAX) 2 MG tablet [[id number]     [facility] #[id number] - [place] [address] [facility] - P [phone] - F [phone]

## 2024-04-23 ENCOUNTER — HOSPITAL ENCOUNTER (OUTPATIENT)
Dept: GENERAL RADIOLOGY | Age: 59
Discharge: HOME | End: 2024-04-23
Payer: OTHER GOVERNMENT

## 2024-04-23 ENCOUNTER — OFFICE VISIT (OUTPATIENT)
Dept: FAMILY MEDICINE CLINIC | Age: 59
End: 2024-04-23
Payer: OTHER GOVERNMENT

## 2024-04-23 VITALS
HEART RATE: 72 BPM | OXYGEN SATURATION: 95 % | HEIGHT: 69 IN | BODY MASS INDEX: 29.33 KG/M2 | DIASTOLIC BLOOD PRESSURE: 80 MMHG | WEIGHT: 198 LBS | SYSTOLIC BLOOD PRESSURE: 134 MMHG

## 2024-04-23 DIAGNOSIS — M06.09 RHEUMATOID ARTHRITIS OF MULTIPLE SITES WITH NEGATIVE RHEUMATOID FACTOR (HCC): ICD-10-CM

## 2024-04-23 DIAGNOSIS — I10 ESSENTIAL HYPERTENSION: Primary | ICD-10-CM

## 2024-04-23 DIAGNOSIS — I10 ESSENTIAL HYPERTENSION: ICD-10-CM

## 2024-04-23 DIAGNOSIS — Z91.09 ENVIRONMENTAL ALLERGIES: ICD-10-CM

## 2024-04-23 DIAGNOSIS — F51.01 PRIMARY INSOMNIA: ICD-10-CM

## 2024-04-23 DIAGNOSIS — R41.89 COGNITIVE IMPAIRMENT: ICD-10-CM

## 2024-04-23 DIAGNOSIS — J45.20 INTERMITTENT ASTHMA WITHOUT COMPLICATION, UNSPECIFIED ASTHMA SEVERITY: ICD-10-CM

## 2024-04-23 DIAGNOSIS — R04.2 HEMOPTYSIS: ICD-10-CM

## 2024-04-23 DIAGNOSIS — I48.0 PAROXYSMAL ATRIAL FIBRILLATION (HCC): ICD-10-CM

## 2024-04-23 DIAGNOSIS — F51.04 CHRONIC INSOMNIA: ICD-10-CM

## 2024-04-23 DIAGNOSIS — F43.22 ADJUSTMENT DISORDER WITH ANXIETY: ICD-10-CM

## 2024-04-23 DIAGNOSIS — K21.9 GASTROESOPHAGEAL REFLUX DISEASE, UNSPECIFIED WHETHER ESOPHAGITIS PRESENT: ICD-10-CM

## 2024-04-23 PROBLEM — L85.3 DRY SKIN DERMATITIS: Status: RESOLVED | Noted: 2021-11-18 | Resolved: 2024-04-23

## 2024-04-23 PROBLEM — R09.81 CONGESTION OF NASAL SINUS: Status: RESOLVED | Noted: 2023-06-13 | Resolved: 2024-04-23

## 2024-04-23 PROBLEM — K52.9 ENTERITIS: Status: RESOLVED | Noted: 2023-04-04 | Resolved: 2024-04-23

## 2024-04-23 PROBLEM — N95.1 MENOPAUSAL SYMPTOMS: Status: RESOLVED | Noted: 2017-06-28 | Resolved: 2024-04-23

## 2024-04-23 PROBLEM — R53.83 OTHER FATIGUE: Status: RESOLVED | Noted: 2020-07-14 | Resolved: 2024-04-23

## 2024-04-23 PROCEDURE — 99215 OFFICE O/P EST HI 40 MIN: CPT | Performed by: NURSE PRACTITIONER

## 2024-04-23 PROCEDURE — 3075F SYST BP GE 130 - 139MM HG: CPT | Performed by: NURSE PRACTITIONER

## 2024-04-23 PROCEDURE — 3079F DIAST BP 80-89 MM HG: CPT | Performed by: NURSE PRACTITIONER

## 2024-04-23 PROCEDURE — 71046 X-RAY EXAM CHEST 2 VIEWS: CPT

## 2024-04-23 SDOH — ECONOMIC STABILITY: FOOD INSECURITY: WITHIN THE PAST 12 MONTHS, THE FOOD YOU BOUGHT JUST DIDN'T LAST AND YOU DIDN'T HAVE MONEY TO GET MORE.: NEVER TRUE

## 2024-04-23 SDOH — ECONOMIC STABILITY: INCOME INSECURITY: HOW HARD IS IT FOR YOU TO PAY FOR THE VERY BASICS LIKE FOOD, HOUSING, MEDICAL CARE, AND HEATING?: NOT HARD AT ALL

## 2024-04-23 SDOH — ECONOMIC STABILITY: FOOD INSECURITY: WITHIN THE PAST 12 MONTHS, YOU WORRIED THAT YOUR FOOD WOULD RUN OUT BEFORE YOU GOT MONEY TO BUY MORE.: NEVER TRUE

## 2024-04-23 ASSESSMENT — PATIENT HEALTH QUESTIONNAIRE - PHQ9
SUM OF ALL RESPONSES TO PHQ QUESTIONS 1-9: 0
SUM OF ALL RESPONSES TO PHQ QUESTIONS 1-9: 0
1. LITTLE INTEREST OR PLEASURE IN DOING THINGS: NOT AT ALL
2. FEELING DOWN, DEPRESSED OR HOPELESS: NOT AT ALL
SUM OF ALL RESPONSES TO PHQ9 QUESTIONS 1 & 2: 0
SUM OF ALL RESPONSES TO PHQ QUESTIONS 1-9: 0
SUM OF ALL RESPONSES TO PHQ QUESTIONS 1-9: 0

## 2024-04-23 NOTE — ASSESSMENT & PLAN NOTE
Managed with Ambien 10 mg nightly.  OARRS report has been reviewed and is consistent with use.  She denies use of alcohol at bedtime.  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

## 2024-04-23 NOTE — ASSESSMENT & PLAN NOTE
Not well-controlled at this time encouraged her to start taking Allegra daily in addition to her inhalers and sinus sprays.  Discussed environmental accommodations as well.

## 2024-04-23 NOTE — ASSESSMENT & PLAN NOTE
Currently taking Xanax 2 mg 3 times daily.  She has been managed with this for a number of years.  OARRS report reviewed and consistent with use  Will provide refills as needed.  Contract has been signed.  She understands she needs to be seen in person every 3 months to continue this.  Additionally she understands rules the contract if she wants to continue to have her medications filled  She understands she is not to be mixing her Xanax with any other sedative, alcohol, or drug.

## 2024-04-23 NOTE — ASSESSMENT & PLAN NOTE
This is gone in need of further evaluation.  At this time I am and have her return to my office so we can discuss this   wound(s)

## 2024-04-23 NOTE — PROGRESS NOTES
BY EACH NOSTRIL DAILY 32 g 1    ELIQUIS 5 MG TABS tablet TAKE 1 TABLET BY MOUTH TWICE DAILY 180 tablet 3    pantoprazole (PROTONIX) 40 MG tablet Take 1 tablet by mouth daily      lactobacillus (CULTURELLE) capsule Take 1 capsule by mouth daily 10 capsule 0    ondansetron (ZOFRAN) 4 MG tablet Take 1 tablet by mouth every 8 hours as needed for Nausea or Vomiting 15 tablet 0    hydrALAZINE (APRESOLINE) 10 MG tablet Take 1 tablet by mouth 3 times daily as needed      Ubrogepant (UBRELVY PO) Take by mouth      ketoconazole (NIZORAL) 2 % shampoo Use to wash the scalp 3 times weekly.  Leave on the scalp for 5 minutes prior to rinsing. 120 mL 3    Acetaminophen-Codeine (TYLENOL WITH CODEINE #3 PO) Take by mouth daily      dicyclomine (BENTYL) 20 MG tablet Take 1 tablet by mouth 4 times daily as needed      predniSONE (DELTASONE) 1 MG tablet Take 4 tablets by mouth daily       No current facility-administered medications for this visit.       Review of Systems   All other systems reviewed and are negative.      Vitals:    04/23/24 1256   BP: 134/80   Pulse: 72   SpO2: 95%   Weight: 89.8 kg (198 lb)   Height: 1.753 m (5' 9\")       Physical Exam  Constitutional:       Appearance: Normal appearance. She is well-developed and normal weight.   HENT:      Head: Normocephalic.      Mouth/Throat:      Mouth: Mucous membranes are moist.   Eyes:      Pupils: Pupils are equal, round, and reactive to light.   Cardiovascular:      Rate and Rhythm: Normal rate and regular rhythm.   Pulmonary:      Effort: Pulmonary effort is normal.   Musculoskeletal:         General: Normal range of motion.      Cervical back: Normal range of motion.   Skin:     General: Skin is warm and dry.   Neurological:      Mental Status: She is alert and oriented to person, place, and time.   Psychiatric:         Attention and Perception: Attention normal.         Mood and Affect: Mood is anxious.         Behavior: Behavior is hyperactive.         Cognition and

## 2024-04-23 NOTE — ASSESSMENT & PLAN NOTE
Currently not well-controlled, I suspect this is due to the high pollen counts and her allergies.  I have encouraged her to take a daily antihistamine in addition to her other medications.  She has a rescue inhaler to use as needed  Will get chest x-ray today to evaluate her hemoptysis complaint.

## 2024-04-24 DIAGNOSIS — R04.2 COUGH WITH HEMOPTYSIS: Primary | ICD-10-CM

## 2024-04-24 LAB
ALBUMIN SERPL-MCNC: 4.6 G/DL (ref 3.4–5)
ALBUMIN/GLOB SERPL: 1.9 {RATIO} (ref 1.1–2.2)
ALP SERPL-CCNC: 63 U/L (ref 40–129)
ALT SERPL-CCNC: 22 U/L (ref 10–40)
AMPHETAMINES UR QL SCN>1000 NG/ML: ABNORMAL
ANION GAP SERPL CALCULATED.3IONS-SCNC: 23 MMOL/L (ref 3–16)
AST SERPL-CCNC: 22 U/L (ref 15–37)
BARBITURATES UR QL SCN>200 NG/ML: ABNORMAL
BASOPHILS # BLD: 0.1 K/UL (ref 0–0.2)
BASOPHILS NFR BLD: 0.8 %
BENZODIAZ UR QL SCN>200 NG/ML: POSITIVE
BILIRUB SERPL-MCNC: 0.4 MG/DL (ref 0–1)
BUN SERPL-MCNC: 16 MG/DL (ref 7–20)
CALCIUM SERPL-MCNC: 9.3 MG/DL (ref 8.3–10.6)
CANNABINOIDS UR QL SCN>50 NG/ML: ABNORMAL
CHLORIDE SERPL-SCNC: 93 MMOL/L (ref 99–110)
CO2 SERPL-SCNC: 26 MMOL/L (ref 21–32)
COCAINE UR QL SCN: ABNORMAL
CREAT SERPL-MCNC: 0.8 MG/DL (ref 0.6–1.1)
DEPRECATED RDW RBC AUTO: 13.1 % (ref 12.4–15.4)
DRUG SCREEN COMMENT UR-IMP: ABNORMAL
EOSINOPHIL # BLD: 0.2 K/UL (ref 0–0.6)
EOSINOPHIL NFR BLD: 2.5 %
FENTANYL SCREEN, URINE: ABNORMAL
GFR SERPLBLD CREATININE-BSD FMLA CKD-EPI: 85 ML/MIN/{1.73_M2}
GLUCOSE SERPL-MCNC: 106 MG/DL (ref 70–99)
HCT VFR BLD AUTO: 40.8 % (ref 36–48)
HGB BLD-MCNC: 14.5 G/DL (ref 12–16)
LYMPHOCYTES # BLD: 2.3 K/UL (ref 1–5.1)
LYMPHOCYTES NFR BLD: 33.4 %
MCH RBC QN AUTO: 35.3 PG (ref 26–34)
MCHC RBC AUTO-ENTMCNC: 35.5 G/DL (ref 31–36)
MCV RBC AUTO: 99.5 FL (ref 80–100)
METHADONE UR QL SCN>300 NG/ML: ABNORMAL
MONOCYTES # BLD: 0.4 K/UL (ref 0–1.3)
MONOCYTES NFR BLD: 6 %
NEUTROPHILS # BLD: 3.9 K/UL (ref 1.7–7.7)
NEUTROPHILS NFR BLD: 57.3 %
OPIATES UR QL SCN>300 NG/ML: ABNORMAL
OXYCODONE UR QL SCN: ABNORMAL
PCP UR QL SCN>25 NG/ML: ABNORMAL
PH UR STRIP: 6 [PH]
PLATELET # BLD AUTO: 283 K/UL (ref 135–450)
PMV BLD AUTO: 8.1 FL (ref 5–10.5)
POTASSIUM SERPL-SCNC: 4.2 MMOL/L (ref 3.5–5.1)
PROT SERPL-MCNC: 7 G/DL (ref 6.4–8.2)
RBC # BLD AUTO: 4.1 M/UL (ref 4–5.2)
SODIUM SERPL-SCNC: 142 MMOL/L (ref 136–145)
WBC # BLD AUTO: 6.7 K/UL (ref 4–11)

## 2024-04-29 ENCOUNTER — TELEPHONE (OUTPATIENT)
Dept: FAMILY MEDICINE CLINIC | Age: 59
End: 2024-04-29

## 2024-04-29 NOTE — TELEPHONE ENCOUNTER
Patient states that she's been playing phone tag and wants to know what's going on; why the CT scan. Patient would like someone to contact her today. Thanks

## 2024-04-30 ENCOUNTER — TELEPHONE (OUTPATIENT)
Dept: INTERNAL MEDICINE CLINIC | Age: 59
End: 2024-04-30

## 2024-04-30 NOTE — TELEPHONE ENCOUNTER
Pt has a new PCP and was told to tell Rehan CUELLO that she is not returning for appts with previous provider.     Future appts have been cancelled.

## 2024-05-06 ENCOUNTER — OFFICE VISIT (OUTPATIENT)
Dept: CARDIOLOGY CLINIC | Age: 59
End: 2024-05-06
Payer: OTHER GOVERNMENT

## 2024-05-06 VITALS
WEIGHT: 195 LBS | SYSTOLIC BLOOD PRESSURE: 130 MMHG | BODY MASS INDEX: 28.8 KG/M2 | DIASTOLIC BLOOD PRESSURE: 80 MMHG | HEART RATE: 68 BPM

## 2024-05-06 DIAGNOSIS — I10 ESSENTIAL HYPERTENSION: ICD-10-CM

## 2024-05-06 DIAGNOSIS — I48.0 PAROXYSMAL ATRIAL FIBRILLATION (HCC): Primary | ICD-10-CM

## 2024-05-06 DIAGNOSIS — R94.31 ABNORMAL EKG: ICD-10-CM

## 2024-05-06 PROCEDURE — 99214 OFFICE O/P EST MOD 30 MIN: CPT | Performed by: INTERNAL MEDICINE

## 2024-05-06 PROCEDURE — 3075F SYST BP GE 130 - 139MM HG: CPT | Performed by: INTERNAL MEDICINE

## 2024-05-06 PROCEDURE — 3079F DIAST BP 80-89 MM HG: CPT | Performed by: INTERNAL MEDICINE

## 2024-05-06 RX ORDER — APIXABAN 5 MG/1
TABLET, FILM COATED ORAL
Qty: 180 TABLET | Refills: 2 | Status: SHIPPED | OUTPATIENT
Start: 2024-05-06

## 2024-05-06 ASSESSMENT — ENCOUNTER SYMPTOMS
COUGH: 0
CHEST TIGHTNESS: 0
SHORTNESS OF BREATH: 0
CHOKING: 0
ABDOMINAL PAIN: 0

## 2024-05-06 NOTE — PROGRESS NOTES
Insecurity (4/23/2024)    Hunger Vital Sign     Worried About Running Out of Food in the Last Year: Never true     Ran Out of Food in the Last Year: Never true   Transportation Needs: Unknown (4/23/2024)    PRAPARE - Transportation     Lack of Transportation (Medical): Not on file     Lack of Transportation (Non-Medical): No   Physical Activity: Not on file   Stress: Not on file   Social Connections: Not on file   Intimate Partner Violence: Not on file   Housing Stability: Unknown (4/23/2024)    Housing Stability Vital Sign     Unable to Pay for Housing in the Last Year: Not on file     Number of Places Lived in the Last Year: Not on file     Unstable Housing in the Last Year: No       FH reviewed, denies FH cardiac issues.      Vitals:    05/06/24 1414   BP: 130/80   Pulse: 68         Wt 195      Review of Systems   Constitutional:  Positive for fatigue. Negative for activity change and appetite change.   Respiratory:  Negative for cough, choking, chest tightness and shortness of breath.    Cardiovascular:  Negative for chest pain, palpitations and leg swelling.        Denies PND or orthopnea. No tachycardia or syncope.    Gastrointestinal:  Negative for abdominal pain.   Musculoskeletal:  Positive for myalgias.   Neurological:  Negative for dizziness, syncope and light-headedness.   Psychiatric/Behavioral:  Negative for agitation, behavioral problems and confusion.    All other systems reviewed negative as done.     Objective:   Physical Exam  Constitutional:       General: She is not in acute distress.     Appearance: She is well-developed.   HENT:      Head: Normocephalic and atraumatic.   Eyes:      General:         Right eye: No discharge.         Left eye: No discharge.      Conjunctiva/sclera: Conjunctivae normal.   Neck:      Vascular: No JVD.   Cardiovascular:      Rate and Rhythm: Normal rate and regular rhythm.      Pulses:           Radial pulses are 2+ on the right side and 2+ on the left side.

## 2024-05-08 ENCOUNTER — HOSPITAL ENCOUNTER (OUTPATIENT)
Dept: CT IMAGING | Age: 59
Discharge: HOME | End: 2024-05-08
Payer: OTHER GOVERNMENT

## 2024-05-08 DIAGNOSIS — R04.2 COUGH WITH HEMOPTYSIS: ICD-10-CM

## 2024-05-08 PROCEDURE — 71250 CT THORAX DX C-: CPT

## 2024-05-13 ENCOUNTER — TELEPHONE (OUTPATIENT)
Dept: FAMILY MEDICINE CLINIC | Age: 59
End: 2024-05-13

## 2024-05-13 DIAGNOSIS — F41.9 ANXIETY: ICD-10-CM

## 2024-05-13 RX ORDER — ALPRAZOLAM 2 MG/1
TABLET ORAL
Qty: 90 TABLET | Refills: 2 | Status: SHIPPED | OUTPATIENT
Start: 2024-05-13 | End: 2024-06-13

## 2024-05-13 RX ORDER — ALPRAZOLAM 2 MG/1
TABLET ORAL
Qty: 90 TABLET | Refills: 0 | Status: CANCELLED | OUTPATIENT
Start: 2024-05-13 | End: 2024-08-12

## 2024-05-13 RX ORDER — ALPRAZOLAM 2 MG/1
TABLET ORAL
Qty: 90 TABLET | Refills: 0 | Status: SHIPPED | OUTPATIENT
Start: 2024-05-13 | End: 2024-05-13

## 2024-05-13 NOTE — TELEPHONE ENCOUNTER
Patient requesting a refill. Thanks        ALPRAZolam (XANAX) 2 MG tablet TAKE 1/2 TO 1 TABLET BY MOUTH THREE TIMES DAILY AS NEEDED FOR ANXIETY Dispense: 90 tablet, Refills: 0 ordered --    04/11/2024 07/11/2024 Anitha Zamudio MD       Summary: TAKE 1/2 TO 1 TABLET BY MOUTH THREE TIMES DAILY AS NEEDED FOR ANXIETY, Disp-90 tablet, R-0 Normal  Guidelines: Med Note: Dx Associated: Start Date & Time: 04/11/2024End w/ Doses: 7/11/2024Ord/Sold: 04/11/2024 (O)Ordered On: 04/11/2024ReportPharmacy: [id number] [address] [facility] [phone] - F [phone]            Admin Instructions: TAKE 1/2 TO 1 TABLET BY MOUTH THREE TIMES DAILY AS NEEDED FOR ANXIETY  Dose History

## 2024-05-14 ENCOUNTER — OFFICE VISIT (OUTPATIENT)
Dept: FAMILY MEDICINE CLINIC | Age: 59
End: 2024-05-14

## 2024-05-14 VITALS
DIASTOLIC BLOOD PRESSURE: 80 MMHG | WEIGHT: 192 LBS | TEMPERATURE: 96.9 F | OXYGEN SATURATION: 95 % | HEART RATE: 70 BPM | BODY MASS INDEX: 28.35 KG/M2 | SYSTOLIC BLOOD PRESSURE: 126 MMHG

## 2024-05-14 DIAGNOSIS — M06.09 RHEUMATOID ARTHRITIS OF MULTIPLE SITES WITH NEGATIVE RHEUMATOID FACTOR (HCC): ICD-10-CM

## 2024-05-14 DIAGNOSIS — I10 ESSENTIAL HYPERTENSION: ICD-10-CM

## 2024-05-14 DIAGNOSIS — N60.11 FIBROCYSTIC DISEASE OF RIGHT BREAST: ICD-10-CM

## 2024-05-14 DIAGNOSIS — E66.3 OVERWEIGHT (BMI 25.0-29.9): ICD-10-CM

## 2024-05-14 DIAGNOSIS — K75.81 STEATOHEPATITIS: ICD-10-CM

## 2024-05-14 DIAGNOSIS — I48.0 PAROXYSMAL ATRIAL FIBRILLATION (HCC): ICD-10-CM

## 2024-05-14 DIAGNOSIS — N64.4 BREAST PAIN: Primary | ICD-10-CM

## 2024-05-14 NOTE — PROGRESS NOTES
Ericka Eubanks (:  1965) is a 58 y.o. female,Established patient, here for evaluation of the following chief complaint(s):  Results      ASSESSMENT/PLAN:  1. Breast pain  Assessment & Plan:  New problem with uncertain diagnosis.  Concern for breast cancer pathology.  Patient scheduled for diagnostic mammogram with ultrasound this week.  Will follow-up with results as needed.  Orders:  -     GURWINDER RAINA DIGITAL DIAGNOSTIC BILATERAL; Future  2. Rheumatoid arthritis of multiple sites with negative rheumatoid factor (HCC)  Assessment & Plan:  Chronic-stable.  Patient to continue with Orencia treatments as scheduled.  She is tolerating this well with no adverse side effects.  She is to discuss with her rheumatologist vaccination schedule in conjunction with her infusions.  3. Paroxysmal atrial fibrillation (HCC)  Assessment & Plan:  Chronic-stable.  Remains on Eliquis for anticoagulation.  Lopressor and flecainide for rate control.  Patient to take both of these well without any negative side effects.  4. Steatohepatitis  5. Overweight (BMI 25.0-29.9)  Assessment & Plan:  Chronic-unstable.  This is likely the cause of her fatty liver disease.  Discussed with patient need for calorie control for weight loss.  Due to her cardiac conditions and generalized pain she is unable to exercise regularly however we did discuss small modifications in diet for calorie deficit.  This should result in mild weight loss.  She is to set a goal of 5 pounds over the next month for weight loss.  6. Fibrocystic disease of right breast  7. Essential hypertension  Assessment & Plan:  Chronic-stable.  Patient to continue on torsemide, metoprolol avoid additional salt in manage weight control.  Patient getting good results with medications without any adverse effects.    No follow-ups on file.    SUBJECTIVE/OBJECTIVE:  Review CT Chest-   Bilat breast pain- needs diag mammo and US, seen by gynecologist today.  On Orencia for RA. Flared with  Patient Name: Tawny Shin  : 1954    MRN: [id number]                              Today's Date: 1/15/2021       Admit Date: 2021    Visit Dx:     ICD-10-CM ICD-9-CM   1. Impaired mobility  Z74.09 799.89     Patient Active Problem List   Diagnosis   • Eustachian tube dysfunction   • Sensorineural hearing loss   • Lumbago of multiple sites in spine with sciatica   • Spondylolisthesis of lumbar region   • Coronary artery disease   • Hyperlipidemia   • Hypertension   • Myalgia due to statin   • S/P coronary artery stent placement   • Pacemaker   • Seropositive rheumatoid arthritis of multiple sites (CMS/[facility])   • Sick sinus syndrome (CMS/[facility])   • Other osteoporosis without current pathological fracture   • Allergic-infective asthma   • Anemia of diabetes   • Arthritis of both knees   • Vasovagal syncope   • Bilateral bunions   • Bronchitis   • Cardiac pacemaker syndrome   • Charcot's joint of foot, left   • Constipation   • 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   • Intrinsic asthma   • Left carotid bruit   • Neutropenia (CMS/[facility])   • Obesity   • Primary osteoarthritis of left knee   • Psoriasis vulgaris   • Recurrent acute serous otitis media of both ears   • S/P lumbar laminectomy   • Thrombocytopenia (CMS/[facility])   • Hypothyroidism   • Vitamin D deficiency   • Myxedema heart disease   • Spondylolisthesis of lumbar region   • Syncope, cardiogenic   • Rupture of gluteus minimus tendon   • Muscle tear     Past Medical History:   Diagnosis Date   • Anemia of diabetes 2020   • Arthritis    • Asthma    • Chronic sinusitis    • Coronary artery disease    • Eustachian tube dysfunction    • Heart disease    • Herpes simplex    • Hyperlipidemia    • Hypertension    • Knee dislocation    • Labral tear of right hip joint    • Laryngitis sicca    • Laryngitis, chronic    • MI (myocardial infarction) (CMS/[facility])    • Otorrhea    • Sensorineural hearing loss    • Sjogren's disease (CMS/[facility])      Past Surgical History:    Procedure Laterality Date   • A-V CARDIAC PACEMAKER INSERTION  2016   • ATRIAL CARDIAC PACEMAKER INSERTION     • CARDIAC CATHETERIZATION     • CATARACT EXTRACTION     • COLONOSCOPY  11/08/2011    One fold in the ascending colon which showed ulcer otherwise normal exam   • COLONOSCOPY  11/12/2004    Normal exam repeat in five years   • CORONARY ANGIOPLASTY WITH STENT PLACEMENT      X 2; 2013 & 2014   • ENDOSCOPY  07/10/2014    Normal exam   • HIP ABDUCTION TENOTOMY BILATERAL Right 1/14/2021    Procedure: RIGHT HIP GLUTEUS MEDLUS / MINIMUS REPAIR, POSSIBLE ACHILLES ALLOGRAFT;  Surgeon: Nino Carlson MD;  Location:  [facility] OR;  Service: Orthopedics;  Laterality: Right;   • JOINT REPLACEMENT     • LUMBAR FUSION N/A 1/19/2018    Procedure: L3-4,L4-5 DECOMPRESSION, POSTERIOR SPINAL FUSION WITH INSTRUMENTATION;  Surgeon: Fortino Oropeza MD;  Location:  [facility] OR;  Service:    • LUMBAR LAMINECTOMY WITH FUSION Left 1/17/2018    Procedure: LEFT L3-4 L4-5 LATERAL LUMBAR INTERBODY FUSION;  Surgeon: Fortino Oropeza MD;  Location:  [facility] OR;  Service:    • MYRINGOTOMY W/ TUBES  09/04/2014    TUBES NO LONGER IN PLACE   • OTHER SURGICAL HISTORY      total knee was infected twice so hardware was removed and spacers were placed   • REPLACEMENT TOTAL KNEE Right      General Information     Row Name 01/15/21 0756          Physical Therapy Time and Intention    Document Type  evaluation post op R gluteus medius and minimus repair 1/14/21  -SB (r) AB (t) SB (c)     Mode of Treatment  physical therapy  -SB (r) AB (t) SB (c)     Row Name 01/15/21 0756          General Information    Patient Profile Reviewed  yes  -SB (r) AB (t) SB (c)     Prior Level of Function  independent:;all household mobility;community mobility;gait;transfer;ADL's;bed mobility  -SB (r) AB (t) SB (c)     Existing Precautions/Restrictions  hip;fall no R hip flexion past 90*; no active R hip abduction; no R hip adduction past neutral; exit bed on left side  -SB  (r) AB (t) SB (c)     Row Name 01/15/21 0756          Living Environment    Lives With  alone daughter is available for help as needed; equipment available: walk in shower, grab bars, rollator; elevated toilet  -SB (r) AB (t) SB (c)     Row Name 01/15/21 [facility]          Home Main Entrance    Number of Stairs, Main Entrance  one  -SB (r) AB (t) SB (c)     Stair Railings, Main Entrance  none  -SB (r) AB (t) SB (c)     Row Name 01/15/21 [facility]          Stairs Within Home, Primary    Number of Stairs, Within Home, Primary  none  -SB (r) AB (t) SB (c)     Stair Railings, Within Home, Primary  --  -SB (r) AB (t) SB (c)     [facility] Name 01/15/21 0756          Cognition    Orientation Status (Cognition)  oriented x 4  -SB (r) AB (t) SB (c)     Row Name 01/15/21 [facility]          Safety Issues, Functional Mobility    Impairments Affecting Function (Mobility)  endurance/activity tolerance;strength;range of motion (ROM);pain  -SB (r) AB (t) SB (c)     Comment, Safety Issues/Impairments (Mobility)  R LE strength and ROM impairments due to weakness  -SB (r) AB (t) SB (c)       User Key  (r) = Recorded By, (t) = Taken By, (c) = Cosigned By    Initials Name Provider Type    Alyssa Roberts, PT DPT Physical Therapist    AB Familia Ambriz, YESI Student PT Student        Mobility     Row Name 01/15/21 0756          Bed Mobility    Bed Mobility  rolling left;supine-sit  -SB (r) AB (t) SB (c)     All Activities, [place] (Bed Mobility)  supervision;1 person assist;verbal cues  -SB (r) AB (t) SB (c)     Rolling Left [place] (Bed Mobility)  verbal cues;1 person assist;supervision  -SB (r) AB (t) SB (c)     Supine-Sit [place] (Bed Mobility)  supervision;1 person assist;verbal cues  -SB (r) AB (t) SB (c)     Assistive Device (Bed Mobility)  bed rails;head of bed elevated  -SB (r) AB (t) SB (c)     Row Name 01/15/21 0756          Bed-Chair Transfer    Bed-Chair [place] (Transfers)  --  -SB (r) AB (t) SB (c)     Assistive Device  (Bed-Chair Transfers)  walker, front-wheeled  -SB (r) AB (t) SB (c)     Row Name 01/15/21 0756          Sit-Stand Transfer    Sit-Stand [place] (Transfers)  contact guard;1 person assist;verbal cues  -SB (r) AB (t) SB (c)     Assistive Device (Sit-Stand Transfers)  walker, front-wheeled  -SB (r) AB (t) SB (c)     Row Name 01/15/21 0756          Gait/Stairs (Locomotion)    [place] Level (Gait)  contact guard;1 person assist;verbal cues  -SB (r) AB (t) SB (c)     Assistive Device (Gait)  walker, front-wheeled  -SB (r) AB (t) SB (c)     Distance in Feet (Gait)  Patient ambulated approx. 30 feet to and from bathroom with CGA from PT and front wheeled walker. Patient demonstrated a step to gait pattern leading with the R LE.  -SB (r) AB (t) SB (c)     [place] Level (Stairs)  not tested  -SB (r) AB (t) SB (c)       User Key  (r) = Recorded By, (t) = Taken By, (c) = Cosigned By    Initials Name Provider Type    SB Alyssa Gallardo, PT DPT Physical Therapist    AB Familia Ambriz, PT Student PT Student        Obj/Interventions     Row Name 01/15/21 0756          Range of Motion Comprehensive    Comment, General Range of Motion  R knee extension ROM limited due to weakness; L LE WNL  -SB (r) AB (t) SB (c)     Row Name 01/15/21 0756          Strength Comprehensive (MMT)    Comment, General Manual Muscle Testing (MMT) Assessment  L LE functional strength WNL; R knee extension MMT 2-/5; R hip not tested due to precautions; all other R LE WFL  -SB (r) AB (t) SB (c)     Row Name 01/15/21 0756          Motor Skills    Motor Skills  functional endurance  -SB (r) AB (t) SB (c)     Functional Endurance  Patient experienced moderate fatigue post ambulation.  -SB (r) AB (t) SB (c)     Row Name 01/15/21 0756          Balance    Balance Assessment  sitting static balance;standing static balance  -SB (r) AB (t) SB (c)     Static Sitting Balance  WNL;unsupported;sitting, edge of bed  -SB (r) AB (t) SB (c)     Static Standing  Balance  WNL;supported CGA assist from PT and handheld support on FWW  -SB (r) AB (t) SB (c)     Row Name 01/15/21 0756          Sensory Assessment (Somatosensory)    Sensory Assessment (Somatosensory)  sensation intact  -SB (r) AB (t) SB (c)     Row Name 01/15/21 075          General Lower Extremity Assessment (Range of Motion)    Lower Extremity: Range of Motion  LLE ROM WNL  -SB (r) AB (t) SB (c)       User Key  (r) = Recorded By, (t) = Taken By, (c) = Cosigned By    Initials Name Provider Type    SB Alyssa Gallardo, PT DPT Physical Therapist    Familia Bruce, PT Student PT Student        Goals/Plan     Row Name 01/15/21 075          Bed Mobility Goal 1 (PT)    Activity/Assistive Device (Bed Mobility Goal 1, PT)  bed mobility activities, all  -SB (r) AB (t) SB (c)     [place] Level/Cues Needed (Bed Mobility Goal 1, PT)  independent  -SB (r) AB (t) SB (c)     Time Frame (Bed Mobility Goal 1, PT)  long term goal (LTG);10 days  -SB (r) AB (t) SB (c)     Progress/Outcomes (Bed Mobility Goal 1, PT)  goal ongoing  -SB (r) AB (t) SB (c)     Row Name 01/15/21 [facility]          Transfer Goal 1 (PT)    Activity/Assistive Device (Transfer Goal 1, PT)  transfers, all  -SB (r) AB (t) SB (c)     [place] Level/Cues Needed (Transfer Goal 1, PT)  modified independence;other (see comments) assistive device use, FWW  -SB (r) AB (t) SB (c)     Time Frame (Transfer Goal 1, PT)  10 days;long term goal (LTG)  -SB (r) AB (t) SB (c)     Progress/Outcome (Transfer Goal 1, PT)  goal ongoing  -SB (r) AB (t) SB (c)     Row Name 01/15/21 0756          Gait Training Goal 1 (PT)    Activity/Assistive Device (Gait Training Goal 1, PT)  gait (walking locomotion);assistive device use;walker, rolling  -SB (r) AB (t) SB (c)     [place] Level (Gait Training Goal 1, PT)  modified independence  -SB (r) AB (t) SB (c)     Distance (Gait Training Goal 1, PT)  Patient to ambulate at least 100 feet with modified independence with no signs of  fatigue or SOB.  -SB (r) AB (t) SB (c)     Time Frame (Gait Training Goal 1, PT)  long term goal (LTG);10 days  -SB (r) AB (t) SB (c)     Progress/Outcome (Gait Training Goal 1, PT)  goal ongoing  -SB (r) AB (t) SB (c)     Row Name 01/15/21 0756          ROM Goal 1 (PT)    ROM Goal 1 (PT)  Patient to demonstrate R knee strength and ROM to WFL.  -SB (r) AB (t) SB (c)     Time Frame (ROM Goal 1, PT)  long-term goal (LTG);5 days  -SB (r) AB (t) SB (c)     Progress/Outcome (ROM Goal 1, PT)  goal ongoing  -SB (r) AB (t) SB (c)     Row Name 01/15/21 0756          Patient Education Goal (PT)    Activity (Patient Education Goal, PT)  Patient to understand and teach back appropriate R hip precautions.  -SB (r) AB (t) SB (c)     [place]/Cues/Accuracy (Memory Goal 2, PT)  independent;verbalizes understanding;demonstrates adequately  -SB (r) AB (t) SB (c)     Time Frame (Patient Education Goal, PT)  long term goal (LTG);10 days  -SB (r) AB (t) SB (c)       User Key  (r) = Recorded By, (t) = Taken By, (c) = Cosigned By    Initials Name Provider Type    Alyssa Roberts, PT DPT Physical Therapist    Familia Bruce, PT Student PT Student        Clinical Impression     Row Name 01/15/21 0756          Pain    Additional Documentation  Pain Scale: Numbers Pre/Post-Treatment (Group)  -SB (r) AB (t) SB (c)     Row Name 01/15/21 0756          Pain Scale: Numbers Pre/Post-Treatment    Pretreatment Pain Rating  2/10  -SB (r) AB (t) SB (c)     Posttreatment Pain Rating  4/10  -SB (r) AB (t) SB (c)     Pain Location - Side  Right  -SB (r) AB (t) SB (c)     Pain Location - Orientation  lateral  -SB (r) AB (t) SB (c)     Pain Location  hip  -SB (r) AB (t) SB (c)     Pain Intervention(s)  Repositioned;Ambulation/increased activity  -SB (r) AB (t) SB (c)     Row Name 01/15/21 0756          Plan of Care Review    Plan of Care Reviewed With  patient  -SB (r) AB (t) SB (c)     Progress  no change  -SB (r) AB (t) SB (c)     Outcome Summary   PT evaluation is complete. Patient is oriented and alert x 4. Patient demonstrated the ability to perform all bed mobility transfers with supervision assist and verbal cues to maintain R hip precautions. Sit-stand, stand-sit transfers, and gait performed today with CGA assist and use of rolling walker. Patient ambulated with a step to gait pattern leading with the R LE. Patient experienced moderate fatigue post evaluation w decreased endurance. R hip strength testing deferred today due to R hip precautions. R knee extension strength was 2-/5 indicating a need for R LE strengthening. PT instructed the patient on the R hip precautions and the importance of adherence. Patient verbalized and demonstrated understanding. PT will continue to benefit from skilled PT in order to improve her functional deficits that are present post operation. PT recommends discharge to home with assist with home health care for home mobility assessment.  -SB (r) AB (t) SB (c)     Row Name 01/15/21 0756          Therapy Assessment/Plan (PT)    Patient/Family Therapy Goals Statement (PT)  return home  -SB (r) AB (t) SB (c)     Rehab Potential (PT)  good, to achieve stated therapy goals  -SB (r) AB (t) SB (c)     Criteria for Skilled Interventions Met (PT)  yes  -SB (r) AB (t) SB (c)     Predicted Duration of Therapy Intervention (PT)  until discharge  -SB (r) AB (t) SB (c)     Row Name 01/15/21 0756          Positioning and Restraints    Pre-Treatment Position  in bed  -SB (r) AB (t) SB (c)     Post Treatment Position  chair  -SB (r) AB (t) SB (c)     In Chair  reclined;call light within reach;encouraged to call for assist;legs elevated abduction pillow in place  -SB (r) AB (t) SB (c)       User Key  (r) = Recorded By, (t) = Taken By, (c) = Cosigned By    Initials Name Provider Type    Alyssa Roberts, PT DPT Physical Therapist    Familia Bruce, PT Student PT Student        Outcome Measures     Row Name 01/15/21 0756          How much  help from another person do you currently need...    Turning from your back to your side while in flat bed without using bedrails?  3  -SB (r) AB (t) SB (c)     Moving from lying on back to sitting on the side of a flat bed without bedrails?  3  -SB (r) AB (t) SB (c)     Moving to and from a bed to a chair (including a wheelchair)?  3  -SB (r) AB (t) SB (c)     Standing up from a chair using your arms (e.g., wheelchair, bedside chair)?  3  -SB (r) AB (t) SB (c)     Climbing 3-5 steps with a railing?  2  -SB (r) AB (t) SB (c)     To walk in hospital room?  3  -SB (r) AB (t) SB (c)     AM-PAC 6 Clicks Score (PT)  17  -SB (r) AB (t)     Row Name 01/15/21 0756          Functional Assessment    Outcome Measure Options  AM-PAC 6 Clicks Basic Mobility (PT)  -SB (r) AB (t) SB (c)       User Key  (r) = Recorded By, (t) = Taken By, (c) = Cosigned By    Initials Name Provider Type    SB Alyssa Gallardo, PT DPT Physical Therapist    Familia Bruce, PT Student PT Student        Physical Therapy Education                 Title: PT OT SLP Therapies (Done)     Topic: Physical Therapy (Done)     Point: Mobility training (Done)     Learning Progress Summary           Patient Acceptance, E, VU by AB at 1/15/2021 0887    Comment: Pt educated on their R hip precautions and the role of PT in their care.                               User Key     Initials Effective Dates Name Provider Type Discipline    AB 12/02/20 -  Familia Ambriz, PT Student PT Student PT              PT Recommendation and Plan  Planned Therapy Interventions (PT): balance training, bed mobility training, gait training, patient/family education, strengthening, ROM (range of motion), transfer training  Plan of Care Reviewed With: patient  Progress: no change  Outcome Summary: PT evaluation is complete. Patient is oriented and alert x 4. Patient demonstrated the ability to perform all bed mobility transfers with supervision assist and verbal cues to maintain R hip  precautions. Sit-stand, stand-sit transfers, and gait performed today with CGA assist and use of rolling walker. Patient ambulated with a step to gait pattern leading with the R LE. Patient experienced moderate fatigue post evaluation w decreased endurance. R hip strength testing deferred today due to R hip precautions. R knee extension strength was 2-/5 indicating a need for R LE strengthening. PT instructed the patient on the R hip precautions and the importance of adherence. Patient verbalized and demonstrated understanding. PT will continue to benefit from skilled PT in order to improve her functional deficits that are present post operation. PT recommends discharge to home with assist with home health care for home mobility assessment.     Time Calculation:   PT Charges     Row Name 01/15/21 0756             Time Calculation    Start Time  0751 5 min chart review; low complex 3  -SB (r) AB (t) SB (c)      Stop Time  0830  -SB (r) AB (t) SB (c)      Time Calculation (min)  39 min  -SB (r) AB (t)      PT Received On  01/15/21  -SB (r) AB (t) SB (c)      PT Goal Re-Cert Due Date  01/25/21  -SB (r) AB (t) SB (c)        User Key  (r) = Recorded By, (t) = Taken By, (c) = Cosigned By    Initials Name Provider Type    Alyssa Roberts, PT DPT Physical Therapist    Familia Bruce, PT Student PT Student            PT G-Codes  Outcome Measure Options: AM-PAC 6 Clicks Basic Mobility (PT)  AM-PAC 6 Clicks Score (PT): 17    Familia Ambriz PT Student  1/15/2021

## 2024-05-15 PROBLEM — N64.4 BREAST PAIN: Status: ACTIVE | Noted: 2024-05-15

## 2024-05-15 NOTE — ASSESSMENT & PLAN NOTE
Chronic-unstable.  This is likely the cause of her fatty liver disease.  Discussed with patient need for calorie control for weight loss.  Due to her cardiac conditions and generalized pain she is unable to exercise regularly however we did discuss small modifications in diet for calorie deficit.  This should result in mild weight loss.  She is to set a goal of 5 pounds over the next month for weight loss.

## 2024-05-15 NOTE — ASSESSMENT & PLAN NOTE
New problem with uncertain diagnosis.  Concern for breast cancer pathology.  Patient scheduled for diagnostic mammogram with ultrasound this week.  Will follow-up with results as needed.

## 2024-05-15 NOTE — ASSESSMENT & PLAN NOTE
Chronic-stable.  Patient to continue with Orencia treatments as scheduled.  She is tolerating this well with no adverse side effects.  She is to discuss with her rheumatologist vaccination schedule in conjunction with her infusions.

## 2024-05-15 NOTE — ASSESSMENT & PLAN NOTE
Chronic-stable.  Remains on Eliquis for anticoagulation.  Lopressor and flecainide for rate control.  Patient to take both of these well without any negative side effects.

## 2024-05-15 NOTE — ASSESSMENT & PLAN NOTE
Chronic-stable.  Patient to continue on torsemide, metoprolol avoid additional salt in manage weight control.  Patient getting good results with medications without any adverse effects.

## 2024-05-17 ENCOUNTER — HOSPITAL ENCOUNTER (OUTPATIENT)
Dept: MAMMOGRAPHY | Age: 59
Discharge: HOME | End: 2024-05-17
Payer: OTHER GOVERNMENT

## 2024-05-17 ENCOUNTER — HOSPITAL ENCOUNTER (OUTPATIENT)
Dept: ULTRASOUND IMAGING | Age: 59
Discharge: HOME | End: 2024-05-17
Payer: OTHER GOVERNMENT

## 2024-05-17 VITALS — BODY MASS INDEX: 28.44 KG/M2 | HEIGHT: 69 IN | WEIGHT: 192 LBS

## 2024-05-17 DIAGNOSIS — R92.8 ABNORMAL MAMMOGRAM: ICD-10-CM

## 2024-05-17 DIAGNOSIS — N64.4 BREAST PAIN: ICD-10-CM

## 2024-05-17 PROCEDURE — G0279 TOMOSYNTHESIS, MAMMO: HCPCS

## 2024-05-17 PROCEDURE — 76642 ULTRASOUND BREAST LIMITED: CPT

## 2024-05-30 ENCOUNTER — HOSPITAL ENCOUNTER (OUTPATIENT)
Age: 59
Setting detail: OUTPATIENT SURGERY
Discharge: HOME | End: 2024-05-30
Attending: INTERNAL MEDICINE | Admitting: INTERNAL MEDICINE
Payer: OTHER GOVERNMENT

## 2024-05-30 ENCOUNTER — ANESTHESIA (OUTPATIENT)
Dept: ENDOSCOPY | Age: 59
End: 2024-05-30
Payer: OTHER GOVERNMENT

## 2024-05-30 ENCOUNTER — ANESTHESIA EVENT (OUTPATIENT)
Dept: ENDOSCOPY | Age: 59
End: 2024-05-30
Payer: OTHER GOVERNMENT

## 2024-05-30 VITALS
DIASTOLIC BLOOD PRESSURE: 84 MMHG | BODY MASS INDEX: 27.99 KG/M2 | RESPIRATION RATE: 16 BRPM | HEART RATE: 69 BPM | OXYGEN SATURATION: 98 % | WEIGHT: 189 LBS | SYSTOLIC BLOOD PRESSURE: 127 MMHG | HEIGHT: 69 IN | TEMPERATURE: 97.1 F

## 2024-05-30 PROCEDURE — C1769 GUIDE WIRE: HCPCS | Performed by: INTERNAL MEDICINE

## 2024-05-30 PROCEDURE — 2580000003 HC RX 258: Performed by: ANESTHESIOLOGY

## 2024-05-30 PROCEDURE — 3700000001 HC ADD 15 MINUTES (ANESTHESIA): Performed by: INTERNAL MEDICINE

## 2024-05-30 PROCEDURE — 3700000000 HC ANESTHESIA ATTENDED CARE: Performed by: INTERNAL MEDICINE

## 2024-05-30 PROCEDURE — 2709999900 HC NON-CHARGEABLE SUPPLY: Performed by: INTERNAL MEDICINE

## 2024-05-30 PROCEDURE — 2500000003 HC RX 250 WO HCPCS: Performed by: NURSE ANESTHETIST, CERTIFIED REGISTERED

## 2024-05-30 PROCEDURE — 7100000010 HC PHASE II RECOVERY - FIRST 15 MIN: Performed by: INTERNAL MEDICINE

## 2024-05-30 PROCEDURE — 6360000002 HC RX W HCPCS: Performed by: NURSE ANESTHETIST, CERTIFIED REGISTERED

## 2024-05-30 PROCEDURE — 7100000011 HC PHASE II RECOVERY - ADDTL 15 MIN: Performed by: INTERNAL MEDICINE

## 2024-05-30 PROCEDURE — 3609012700 HC EGD DILATION SAVORY: Performed by: INTERNAL MEDICINE

## 2024-05-30 RX ORDER — LIDOCAINE HYDROCHLORIDE 20 MG/ML
INJECTION, SOLUTION INFILTRATION; PERINEURAL PRN
Status: DISCONTINUED | OUTPATIENT
Start: 2024-05-30 | End: 2024-05-30 | Stop reason: SDUPTHER

## 2024-05-30 RX ORDER — SODIUM CHLORIDE, SODIUM LACTATE, POTASSIUM CHLORIDE, CALCIUM CHLORIDE 600; 310; 30; 20 MG/100ML; MG/100ML; MG/100ML; MG/100ML
INJECTION, SOLUTION INTRAVENOUS CONTINUOUS
Status: DISCONTINUED | OUTPATIENT
Start: 2024-05-30 | End: 2024-05-30 | Stop reason: HOSPADM

## 2024-05-30 RX ORDER — PROPOFOL 10 MG/ML
INJECTION, EMULSION INTRAVENOUS PRN
Status: DISCONTINUED | OUTPATIENT
Start: 2024-05-30 | End: 2024-05-30 | Stop reason: SDUPTHER

## 2024-05-30 RX ORDER — ZOLPIDEM TARTRATE 10 MG/1
10 TABLET ORAL NIGHTLY PRN
COMMUNITY
Start: 2024-05-13

## 2024-05-30 RX ORDER — PROMETHAZINE HYDROCHLORIDE 6.25 MG/5ML
2.5 SYRUP ORAL DAILY PRN
COMMUNITY

## 2024-05-30 RX ADMIN — SODIUM CHLORIDE, POTASSIUM CHLORIDE, SODIUM LACTATE AND CALCIUM CHLORIDE: 600; 310; 30; 20 INJECTION, SOLUTION INTRAVENOUS at 11:38

## 2024-05-30 RX ADMIN — PROPOFOL 100 MG: 10 INJECTION, EMULSION INTRAVENOUS at 11:57

## 2024-05-30 RX ADMIN — LIDOCAINE HYDROCHLORIDE 50 MG: 20 INJECTION, SOLUTION INFILTRATION; PERINEURAL at 11:57

## 2024-05-30 RX ADMIN — PROPOFOL 100 MG: 10 INJECTION, EMULSION INTRAVENOUS at 12:02

## 2024-05-30 ASSESSMENT — PAIN - FUNCTIONAL ASSESSMENT
PAIN_FUNCTIONAL_ASSESSMENT: 0-10

## 2024-05-30 ASSESSMENT — LIFESTYLE VARIABLES: SMOKING_STATUS: 0

## 2024-05-30 NOTE — DISCHARGE INSTRUCTIONS
ENDOSCOPY DISCHARGE INSTRUCTIONS:    Call the physician that did your procedure for any questions or concern:    [facility]: [phone]  DR. FAUSTO MOTLEY        ACTIVITY:    There are potential side effects to the medications used for sedation and anesthesia during your procedure.  These include:  Dizziness or light-headedness, confusion or memory loss, delayed reaction times, loss of coordination, nausea and vomiting.  Because of your increased risk for injury, we ask that you observe the following precautions:  For the next 24 hours,  DO NOT operate an automobile, bicycle, motorcycle, , power tools or large equipment of any kind.  Do not drink alcohol, sign any legal documents or make any legal decisions for 24 hours.  Do not bend your head over lower than your heart.  DO sit on the side of bed/couch awhile before getting up.  Plan on bedrest or quiet relaxation today.  You may resume normal activities in 24 hours.    DIET:    Your first meal today should be light, avoiding spicy and fatty foods.  If you tolerate this first meal, then you may advance to your regular diet unless otherwise advised by your physician.    NORMAL SYMPTOMS:  -Mild sore throat if you’ve had an EGD   -Gaseous discomfort    NOTIFY YOUR PHYSICIAN IF THESE SYMPTOMS OCCUR:  1. Fever (greater than 100)  5. Increased abdominal bloating  2. Severe pain    6. Excessive bleeding  3. Nausea and vomiting  7. Chest pain                                                                    4. Chills    8. Shortness of breath    ADDITIONAL INSTRUCTIONS:    Biopsy results: Call [facility] for biopsy results in 1 week    Educational Information:    Recommendations:   Recommend Protonix 40 mg every morning and Pepcid 40 mg at bedtime  Strict diet and lifestyle instructions for GERD as discussed  Follow-up with us in the office in 6 to 8 weeks/as needed      Please review these discharge instructions this evening or tomorrow

## 2024-05-30 NOTE — ANESTHESIA PRE PROCEDURE
Department of Anesthesiology  Preprocedure Note       Name:  Ericka Eubanks   Age:  58 y.o.  :  1965                                          MRN:  [id number]         Date:  2024      Surgeon: Surgeon(s):  Mike Castillo MD    Procedure: Procedure(s):  ESOPHAGOGASTRODUODENOSCOPY    Medications prior to admission:   Prior to Admission medications    Medication Sig Start Date End Date Taking? Authorizing Provider   ALPRAZolam (XANAX) 2 MG tablet Take 1-2 mg up to 3 times daily PRN anxiety. Max dose 6mg daily 24  Rebecca Jama APRN - CNP   ELIQUIS 5 MG TABS tablet TAKE 1 TABLET BY MOUTH TWICE DAILY 24   Monica Chadwick APRN - CNP   potassium chloride (KLOR-CON M) 20 MEQ extended release tablet TAKE 1 TABLET BY MOUTH TWICE DAILY 3/20/24   Anitha Zamudio MD   montelukast (SINGULAIR) 10 MG tablet TAKE 1 TABLET BY MOUTH DAILY 3/17/24   Onur Keith DO   torsemide (DEMADEX) 20 MG tablet Take 1 tablet by mouth daily 24   Vikram Null MD   fluticasone-salmeterol (ADVAIR HFA) 115-21 MCG/ACT inhaler INHALE 2 PUFFS INTO THE LUNGS DAILY 24   Anitha Zamudio MD   flecainide (TAMBOCOR) 50 MG tablet TAKE 1 TABLET BY MOUTH TWICE DAILY 23   Monica Chadwick APRN - CNP   metoprolol tartrate (LOPRESSOR) 25 MG tablet TAKE 2 TABLETS(50 MG) BY MOUTH TWICE DAILY 10/4/23   Monica Chadwick APRN - CNP   albuterol sulfate HFA (PROVENTIL;VENTOLIN;PROAIR) 108 (90 Base) MCG/ACT inhaler INHALE 2 PUFFS EVERY 4 HOURS WHILE AWAKE FOR 7-10 DAYS, THEN EVERY 6 HOURS AS NEEDED FOR WHEEZING 23   Onur Keith DO   famotidine (PEPCID) 40 MG tablet Take 1 tablet by mouth nightly at bedtime 23   Agnes Garcias MD   estradiol (ESTRACE) 1 MG tablet Take 1.5 tablets by mouth daily 23   Agnes Garcias MD   OREBRODY CLICKJECT 125 MG/ML SOAJ  23   Provider, Historical, MD   azelastine (ASTELIN) 0.1 % nasal spray USE 1 SPRAY IN EACH NOSTRIL TWICE DAILY AS DIRECTED

## 2024-05-30 NOTE — ANESTHESIA POSTPROCEDURE EVALUATION
Department of Anesthesiology  Postprocedure Note    Patient: Ericka Eubanks  MRN: [id number]  YOB: 1965  Date of evaluation: 5/30/2024    Procedure Summary       Date: 05/30/24 Room / Location: [place] [address] / [facility]    Anesthesia Start: 1155 Anesthesia Stop: 1208    Procedure: ESOPHAGOGASTRODUODENOSCOPY DILATION SAVORY Diagnosis:       Dysphagia, unspecified type      (Dysphagia, unspecified type [R13.10])    Surgeons: Mike Castillo MD Responsible Provider: Felicitas Doll DO    Anesthesia Type: MAC ASA Status: 3            Anesthesia Type: No value filed.    Ibis Phase I: Ibis Score: 10    Ibis Phase II: Ibis Score: 10    Anesthesia Post Evaluation    Patient location during evaluation: PACU  Patient participation: complete - patient participated  Level of consciousness: awake and alert  Airway patency: patent  Nausea & Vomiting: no nausea and no vomiting  Cardiovascular status: blood pressure returned to baseline  Respiratory status: acceptable  Hydration status: euvolemic  Pain management: adequate    No notable events documented.

## 2024-05-30 NOTE — PROGRESS NOTES
Ambulatory Surgery/Procedure Discharge Note    Vitals:    05/30/24 1209   BP: 103/76   Pulse: 70   Resp: 16   Temp: 97.1 °F (36.2 °C)   SpO2: 95%     Pt meets discharge criteria per Ibis score.  No intake/output data recorded.    Restroom use offered before discharge.  Yes    Pain assessment:  none  Pain Level: 0    Pt and S.O./family states \"ready to go home\". Pt alert and oriented x4. IV removed. Denies N/V or pain. Voided prior to discharge. Pt tolerating po intake. Discharge instructions given to pt and  with pt permission. Pt and  verbalized understanding of all instructions. Left with all belongings, and discharge instructions. Per Dr. Castillo, two prescriptions will be called into pt pharmacy.    Patient discharged to home/self care. Patient discharged via wheel chair by transporter to waiting family/S.O.       5/30/2024 12:14 PM

## 2024-05-30 NOTE — PROCEDURES
PROCEDURE NOTE  Date: 2024   Name: Ericka Eubanks  YOB: 1965    Procedures  EGD, SAVARY DILATION                  Endoscopy Note    Patient: Ericka Eubanks  : 1965  Acct#:     Procedure: Esophagogastroduodenoscopy with esophageal Savary dilation    Date:  2024     Surgeon:  Mike Castillo MD,     Referring Physician:  Rebecca Jama, APRN - CNP      Preoperative Diagnosis:    58-year-old female is here for EGD/dilation with complaints of esophageal dysphagia.  She has history of esophageal stricture and has had dilation in the past      Anesthesia: MAC anesthesia      Consent:  The patient or their legal guardian has signed an informed consent, and is aware of the potential risks, benefits, alternatives, and potential complications of this procedure.  These include, but are not limited to hemorrhage, bleeding, post procedural pain, perforation, phlebitis, aspiration, hypotension, hypoxia, cardiovascular events such as arryhthmia, and possibly death.     Description of Procedure: The patient was then taken to the endoscopy suite, placed in the left lateral decubitus position and the above IV sedation was administrered.  The Olympus video endoscope was placed through the patient's oropharynx without difficulty to the extent of the 2nd portion of the duodenum. The patient tolerated the procedure well and was taken to the post anesthesia care unit in good condition.    Complications: None  EBL: none      Findings:  Esophagus:  Visualization of the esophagus demonstrated normal mucosa in the upper and mid and distal esophagus.  LA class grade a esophagitis seen near the GE junction.  2 cm sliding hiatal hernia seen.  GE junction at 48 cm.  Savary dilation performed with 48 Chadian and 51 Chadian.  Post 51 Chadian dilation, small mucosal tear seen in upper esophagus just beyond the UES     Stomach:  The scope was then advanced into the stomach.  Both forward and retroflexed views of

## 2024-06-03 RX ORDER — POTASSIUM CHLORIDE 20 MEQ/1
TABLET, EXTENDED RELEASE ORAL
Qty: 60 TABLET | Refills: 1 | Status: SHIPPED | OUTPATIENT
Start: 2024-06-03

## 2024-06-21 DIAGNOSIS — J45.20 INTERMITTENT ASTHMA WITHOUT COMPLICATION, UNSPECIFIED ASTHMA SEVERITY: ICD-10-CM

## 2024-06-21 RX ORDER — FLUTICASONE PROPIONATE AND SALMETEROL XINAFOATE 115; 21 UG/1; UG/1
2 AEROSOL, METERED RESPIRATORY (INHALATION) 2 TIMES DAILY
Qty: 12 G | Refills: 5 | Status: SHIPPED | OUTPATIENT
Start: 2024-06-21

## 2024-06-21 NOTE — PROGRESS NOTES
Pt requesting medication update as last provider was before Rebecca. Rebecca advised pt to take 2 puffs in the morning & 2 puff before bedtime. Pharmacy will not fill it unless new script is sent in.

## 2024-07-02 DIAGNOSIS — L65.9 NON-SCARRING ALOPECIA: ICD-10-CM

## 2024-07-03 DIAGNOSIS — J30.9 ALLERGIC RHINITIS, UNSPECIFIED SEASONALITY, UNSPECIFIED TRIGGER: ICD-10-CM

## 2024-07-03 RX ORDER — KETOCONAZOLE 20 MG/ML
SHAMPOO TOPICAL
Qty: 120 ML | Refills: 3 | Status: SHIPPED | OUTPATIENT
Start: 2024-07-03

## 2024-07-03 RX ORDER — MONTELUKAST SODIUM 10 MG/1
10 TABLET ORAL DAILY
Qty: 30 TABLET | Refills: 3 | Status: SHIPPED | OUTPATIENT
Start: 2024-07-03

## 2024-07-03 RX ORDER — LORATADINE 10 MG/1
10 TABLET ORAL DAILY
Qty: 30 TABLET | Refills: 3 | Status: SHIPPED | OUTPATIENT
Start: 2024-07-03

## 2024-07-03 NOTE — TELEPHONE ENCOUNTER
Refill request for Singulair, last refilled on 3/17/24, last OV on 7/31/23, Please advise        Lmovm asking pt to call office, pt needs a f/u apt

## 2024-07-05 ENCOUNTER — OFFICE VISIT (OUTPATIENT)
Dept: CARDIOLOGY CLINIC | Age: 59
End: 2024-07-05
Payer: OTHER GOVERNMENT

## 2024-07-05 VITALS
WEIGHT: 188.6 LBS | BODY MASS INDEX: 27.85 KG/M2 | HEART RATE: 61 BPM | DIASTOLIC BLOOD PRESSURE: 96 MMHG | SYSTOLIC BLOOD PRESSURE: 150 MMHG

## 2024-07-05 DIAGNOSIS — R03.0 ELEVATED BLOOD PRESSURE READING IN OFFICE WITHOUT DIAGNOSIS OF HYPERTENSION: Primary | ICD-10-CM

## 2024-07-05 DIAGNOSIS — I48.0 PAROXYSMAL ATRIAL FIBRILLATION (HCC): ICD-10-CM

## 2024-07-05 PROCEDURE — 3077F SYST BP >= 140 MM HG: CPT | Performed by: INTERNAL MEDICINE

## 2024-07-05 PROCEDURE — 99214 OFFICE O/P EST MOD 30 MIN: CPT | Performed by: INTERNAL MEDICINE

## 2024-07-05 PROCEDURE — 93000 ELECTROCARDIOGRAM COMPLETE: CPT | Performed by: INTERNAL MEDICINE

## 2024-07-05 PROCEDURE — 3080F DIAST BP >= 90 MM HG: CPT | Performed by: INTERNAL MEDICINE

## 2024-07-05 RX ORDER — DOXAZOSIN 2 MG/1
2 TABLET ORAL NIGHTLY
Qty: 30 TABLET | Refills: 3 | Status: SHIPPED | OUTPATIENT
Start: 2024-07-05 | End: 2024-07-05

## 2024-07-05 RX ORDER — DOXAZOSIN 2 MG/1
1 TABLET ORAL NIGHTLY
Qty: 30 TABLET | Refills: 3 | Status: SHIPPED | OUTPATIENT
Start: 2024-07-05

## 2024-07-05 NOTE — PROGRESS NOTES
[facility]     Outpatient Cardiology         Patient Name:  Ericka Eubanks  Primary Care Physician: Rebecca Jama, APRN - CNP  07/05/24     Assessment & Plan    Assessment / Plan:     Elevated blood pressure without diagnosis of hypertension.  Possibly related to increase pain levels and increase steroid dose. Multiple allergies making medical management challenging. Trial Cardura 1 mg at night. Will send 2 mg tablets, break in half for now.  Low-salt diet.  Paroxysmal Afib-currently in sinus rhythm.  Continue Eliquis 5 mg twice daily and Flecainide.50 mg twice daily.   Left neck pain/lump tender to touch likely soft tissue injury   Inflammatory arthritis-has generalized inflammatory arthritis.  Has seen rheumatologist.  Dose of prednisone has been increased.  Has arthralgias.  Generally feeling unwell        Chief Complaint:     Chief Complaint   Patient presents with    Follow-up    Hypertension    Atrial Fibrillation       History of Present Illness:       HPI     Ericka uW a 58 y.o. female with PMH of HTN, afib, rheumatoid arthritis, and LATONIA. Previously seen by Dr. Vikram Null.    Here for elevated blood pressure.  Is currently having an RA flare up. Taking increased dose of Prednisone and is tapering down to her maintenance dose of 3 mg daily. BP has been elevated possibly due to her pain. Has a nodule on her left upper chest that is tender to touch. Has occasional lightheadedness and dizziness.   Patient denies any chest pain, shortness of breath, palpitations, presyncope or syncope. No TIA. No claudication. No recent hospitalizations    PMH  Past Medical History:   Diagnosis Date    Alcohol abuse, in remission     Anxiety state, unspecified     Atrial fibrillation (HCC)     COVID     Depressive disorder, not elsewhere classified     Diffuse cystic mastopathy     Extrinsic asthma, unspecified     Hydronephrosis     Hypertension     Leiomyoma of uterus, unspecified     MS

## 2024-07-05 NOTE — PATIENT INSTRUCTIONS
Trial Cardura 1 mg at night. Will send 2 mg tablets, break in half for now.  Continue to monitor blood pressure at home  Avoid excessive salt intake   Follow up in 1 month         Thank you for choosing [facility] for your cardiology care.    During your visit today, we reviewed and confirmed your cardiac medications along with  medication prescribed by your other healthcare team members. Please be sure to discuss any  changes to medication with your providers.    Please bring a list of ALL medications (or the bottles) with you to EVERY appointment.  Also include vitamins and over-the-counter medications.    If you need refills for any cardiac medications, please call your pharmacy and they will reach out to us electronically.    Did your provider order testing today? If yes, then you will receive your results in three  possible ways. You can receive a [facility] message, a phone call, or letter in the mail. Please  note, if you are an active [facility] user, some of your testing will be available within 1-2 days.    Finally, please know that it is good for your heart to exercise and follow a healthy, low-fat diet  as advised by your physician and health care providers.    If you are experiencing a medical emergency, please call 911 immediately.    It's easy to register for a [facility] account if you don't already have one. With a [facility]  account you can manage your health record, view test results, schedule appointments and  more.     Dr. Ibarra's clinical staff can be reached at the following phone number: [phone]

## 2024-07-08 ENCOUNTER — OFFICE VISIT (OUTPATIENT)
Dept: FAMILY MEDICINE CLINIC | Age: 59
End: 2024-07-08
Payer: OTHER GOVERNMENT

## 2024-07-08 VITALS
OXYGEN SATURATION: 97 % | WEIGHT: 186 LBS | SYSTOLIC BLOOD PRESSURE: 130 MMHG | TEMPERATURE: 96.9 F | HEART RATE: 76 BPM | DIASTOLIC BLOOD PRESSURE: 88 MMHG | BODY MASS INDEX: 27.47 KG/M2

## 2024-07-08 DIAGNOSIS — M06.09 RHEUMATOID ARTHRITIS OF MULTIPLE SITES WITH NEGATIVE RHEUMATOID FACTOR (HCC): ICD-10-CM

## 2024-07-08 DIAGNOSIS — F51.01 PRIMARY INSOMNIA: Primary | ICD-10-CM

## 2024-07-08 PROCEDURE — 99214 OFFICE O/P EST MOD 30 MIN: CPT | Performed by: NURSE PRACTITIONER

## 2024-07-08 PROCEDURE — 3079F DIAST BP 80-89 MM HG: CPT | Performed by: NURSE PRACTITIONER

## 2024-07-08 PROCEDURE — 3075F SYST BP GE 130 - 139MM HG: CPT | Performed by: NURSE PRACTITIONER

## 2024-07-08 PROCEDURE — G2211 COMPLEX E/M VISIT ADD ON: HCPCS | Performed by: NURSE PRACTITIONER

## 2024-07-08 RX ORDER — ZOLPIDEM TARTRATE 10 MG/1
10 TABLET ORAL NIGHTLY PRN
Qty: 90 TABLET | Refills: 0 | Status: SHIPPED | OUTPATIENT
Start: 2024-07-08 | End: 2024-10-06

## 2024-07-08 RX ORDER — ZOLPIDEM TARTRATE 10 MG/1
10 TABLET ORAL NIGHTLY PRN
Qty: 30 TABLET | OUTPATIENT
Start: 2024-07-08

## 2024-07-08 ASSESSMENT — ENCOUNTER SYMPTOMS
NAUSEA: 0
TROUBLE SWALLOWING: 0
EYE DISCHARGE: 0
ABDOMINAL PAIN: 0
ABDOMINAL DISTENTION: 0
SHORTNESS OF BREATH: 0
RHINORRHEA: 0
VOMITING: 0
CONSTIPATION: 0
STRIDOR: 0
COUGH: 0
CHEST TIGHTNESS: 0
EYE PAIN: 0
WHEEZING: 0
DIARRHEA: 0
EYE REDNESS: 0
SINUS PAIN: 0
SINUS PRESSURE: 0
BACK PAIN: 0
EYE ITCHING: 0

## 2024-07-08 NOTE — PROGRESS NOTES
Appearance: Normal appearance. She is well-developed and normal weight.   HENT:      Head: Normocephalic.      Right Ear: Tympanic membrane normal.      Left Ear: Tympanic membrane normal.      Mouth/Throat:      Mouth: Mucous membranes are moist.   Eyes:      Pupils: Pupils are equal, round, and reactive to light.   Cardiovascular:      Rate and Rhythm: Normal rate and regular rhythm.      Heart sounds: Normal heart sounds.   Pulmonary:      Effort: Pulmonary effort is normal.      Breath sounds: Normal breath sounds.   Musculoskeletal:         General: Normal range of motion.      Cervical back: Normal range of motion.   Skin:     General: Skin is warm and dry.   Neurological:      Mental Status: She is alert and oriented to person, place, and time.               An electronic signature was used to authenticate this note.    --THALIA DELVALLE - CNP

## 2024-07-08 NOTE — ASSESSMENT & PLAN NOTE
Chronic-unstable and flaring.  She does follow with rheumatology for this.  She is decreasing her dose of prednisone to 3 mg daily and she takes Percocet 4 times a day for generalized discomfort.  I recommended she discuss the use of Cymbalta with her pain management physician.  I be happy to order this once this has been discussed on their end.

## 2024-07-08 NOTE — ASSESSMENT & PLAN NOTE
Chronic-not stable or well-controlled.  I did impress upon patient I am unable to give her any other additional sleep meds given that she is taking Ambien 10 mg and she takes Percocet.  It is possible many of her symptoms are a result of the increased prednisone and some of the symptoms may begin to resolve as she decreases her dose.  We did discuss sleep hygiene and regular sleep habits.

## 2024-07-09 ENCOUNTER — TELEPHONE (OUTPATIENT)
Dept: CARDIOLOGY CLINIC | Age: 59
End: 2024-07-09

## 2024-07-09 NOTE — TELEPHONE ENCOUNTER
Pt called stating that she has yet to take the new medication prescribed for her on her visit last week due to the pharmacy not filling it until yesterday. Pt states that her BP yesterday was 130/88 at the PCP and is 140/? Today from home. Pt is inquiring as to whether she can continue to NOT take the medication as she is very concerned about the possibility of it making her even more dizzy/lightheaded than she already is.  doxazosin (CARDURA) 2 MG tablet [[id number]]    Order Details  Dose: 1 mg Route: Oral Frequency: NIGHTLY   Dispense Quantity: 30 tablet Refills: 3          Sig: Take 0.5 tablets by mouth nightly         Start Date: 07/05/24       [phone]

## 2024-07-11 DIAGNOSIS — R03.0 ELEVATED BLOOD PRESSURE READING IN OFFICE WITHOUT DIAGNOSIS OF HYPERTENSION: ICD-10-CM

## 2024-07-11 RX ORDER — DOXAZOSIN 2 MG/1
1 TABLET ORAL PRN
Qty: 30 TABLET | Refills: 3 | Status: SHIPPED | OUTPATIENT
Start: 2024-07-11

## 2024-07-15 ENCOUNTER — TELEPHONE (OUTPATIENT)
Dept: FAMILY MEDICINE CLINIC | Age: 59
End: 2024-07-15

## 2024-07-15 NOTE — TELEPHONE ENCOUNTER
Patient need a referral for a eye doctor and a therapist(psychiatrist).STEPH has MS and arthritis and need some help. Her phone contact is [phone]

## 2024-07-15 NOTE — TELEPHONE ENCOUNTER
Pt was advised of contacting her insurance & will follow up with the office to see who is under her plan to get the right referral faxed over.

## 2024-07-24 ENCOUNTER — OFFICE VISIT (OUTPATIENT)
Dept: FAMILY MEDICINE CLINIC | Age: 59
End: 2024-07-24
Payer: OTHER GOVERNMENT

## 2024-07-24 VITALS
SYSTOLIC BLOOD PRESSURE: 122 MMHG | DIASTOLIC BLOOD PRESSURE: 70 MMHG | BODY MASS INDEX: 29.39 KG/M2 | HEART RATE: 73 BPM | WEIGHT: 199 LBS | OXYGEN SATURATION: 95 % | TEMPERATURE: 96.9 F

## 2024-07-24 DIAGNOSIS — F43.22 ADJUSTMENT DISORDER WITH ANXIETY: Primary | ICD-10-CM

## 2024-07-24 PROCEDURE — 99213 OFFICE O/P EST LOW 20 MIN: CPT | Performed by: NURSE PRACTITIONER

## 2024-07-24 PROCEDURE — 3078F DIAST BP <80 MM HG: CPT | Performed by: NURSE PRACTITIONER

## 2024-07-24 PROCEDURE — 3074F SYST BP LT 130 MM HG: CPT | Performed by: NURSE PRACTITIONER

## 2024-07-24 NOTE — PROGRESS NOTES
Ericka Eubanks (:  1965) is a 58 y.o. female,Established patient, here for evaluation of the following chief complaint(s):  3 Month Follow-Up      ASSESSMENT/PLAN:  1. Adjustment disorder with anxiety  Assessment & Plan:  Chronic-stable and uncontrolled.  Patient is not currently suicidal homicidal but would like to find another therapist who will take her insurance.  I have given her the number for [facility] with multiple locations in the [facility] she can call them and see if they take her insurance.    No follow-ups on file.    Patient presents today with request for referrals.    Current Outpatient Medications   Medication Sig Dispense Refill    doxazosin (CARDURA) 2 MG tablet Take 0.5 tablets by mouth as needed (for systolic blood pressure greater than 180 or diastolic greater than 100) 30 tablet 3    zolpidem (AMBIEN) 10 MG tablet Take 1 tablet by mouth nightly as needed for Sleep for up to 90 days. Max Daily Amount: 10 mg 90 tablet 0    ketoconazole (NIZORAL) 2 % shampoo USE TO WASH THE SCALP 3 TIMES WEEKLY. LEAVE ON SCALP FOR 5 MINUTES PRIOR TO RINSING 120 mL 3    loratadine (CLARITIN) 10 MG tablet TAKE 1 TABLET BY MOUTH DAILY 30 tablet 3    montelukast (SINGULAIR) 10 MG tablet TAKE 1 TABLET BY MOUTH DAILY 30 tablet 3    fluticasone-salmeterol (ADVAIR HFA) 115-21 MCG/ACT inhaler Inhale 2 puffs into the lungs 2 times daily 12 g 5    potassium chloride (KLOR-CON M) 20 MEQ extended release tablet TAKE 1 TABLET BY MOUTH TWICE DAILY 60 tablet 1    promethazine (PHENERGAN) 6.25 MG/5ML syrup Take 2.5 mg/mL by mouth daily as needed for Nausea      ELIQUIS 5 MG TABS tablet TAKE 1 TABLET BY MOUTH TWICE DAILY 180 tablet 2    torsemide (DEMADEX) 20 MG tablet Take 1 tablet by mouth daily 90 tablet 3    flecainide (TAMBOCOR) 50 MG tablet TAKE 1 TABLET BY MOUTH TWICE DAILY 180 tablet 3    metoprolol tartrate (LOPRESSOR) 25 MG tablet TAKE 2 TABLETS(50 MG) BY MOUTH TWICE DAILY 360 tablet 3    albuterol sulfate

## 2024-07-24 NOTE — ASSESSMENT & PLAN NOTE
Chronic-stable and uncontrolled.  Patient is not currently suicidal homicidal but would like to find another therapist who will take her insurance.  I have given her the number for [facility] with multiple locations in the [place] area she can call them and see if they take her insurance.

## 2024-08-02 RX ORDER — POTASSIUM CHLORIDE 20 MEQ/1
TABLET, EXTENDED RELEASE ORAL
Qty: 60 TABLET | Refills: 1 | Status: SHIPPED | OUTPATIENT
Start: 2024-08-02

## 2024-08-05 DIAGNOSIS — J30.9 ALLERGIC RHINITIS, UNSPECIFIED SEASONALITY, UNSPECIFIED TRIGGER: ICD-10-CM

## 2024-08-05 RX ORDER — AZELASTINE 1 MG/ML
SPRAY, METERED NASAL
Qty: 60 ML | Refills: 1 | OUTPATIENT
Start: 2024-08-05

## 2024-08-21 ENCOUNTER — TELEPHONE (OUTPATIENT)
Dept: FAMILY MEDICINE CLINIC | Age: 59
End: 2024-08-21

## 2024-08-21 DIAGNOSIS — N64.4 BREAST PAIN: Primary | ICD-10-CM

## 2024-08-21 NOTE — TELEPHONE ENCOUNTER
Patient says the tenderness in her right breast has returned and it is time for her annual mammogram and  she is concerned about getting radiation this often since she just had the right breast examined recently. She would like to know how to proceed especially since her left breast has not been examined. Please call patient concerning this matter.    [phone] (home)   [phone]

## 2024-08-22 ENCOUNTER — TELEPHONE (OUTPATIENT)
Dept: SURGERY | Age: 59
End: 2024-08-22

## 2024-08-22 NOTE — TELEPHONE ENCOUNTER
Menstrual History:  Menarche age: 13  .  Age first live birth:  23  Breastfeed:  yes, 1 year      Postmenopausal     Hysterectomy without BSO- age 32    Oral contraceptive use: Yes, 5 years   Hormone replacement therapy use:  currently using Premarin for 3 years     Breast density: scattered fibroglandular   Ashkenazi [employer] Heritage: no   Genetic testing: none     Family history significant for breast and ovarian cancer: M 3rd total of six , dx with breast cancer         Lifetime risk for breast cancer 4.9%      Diet: none  Alcohol: 2 beers a night   Exercise: none   Sleep:  4-5 hours  Caffeine:  3 cups of iced tea,diet coke

## 2024-08-30 DIAGNOSIS — F51.01 PRIMARY INSOMNIA: ICD-10-CM

## 2024-08-30 DIAGNOSIS — F41.9 ANXIETY: ICD-10-CM

## 2024-08-31 RX ORDER — ZOLPIDEM TARTRATE 10 MG/1
TABLET ORAL
Qty: 90 TABLET | Refills: 0 | Status: SHIPPED | OUTPATIENT
Start: 2024-08-31 | End: 2024-11-29

## 2024-08-31 RX ORDER — ALPRAZOLAM 2 MG
TABLET ORAL
Qty: 90 TABLET | Refills: 0 | Status: SHIPPED | OUTPATIENT
Start: 2024-08-31 | End: 2024-09-29

## 2024-09-03 RX ORDER — METOPROLOL TARTRATE 25 MG/1
TABLET, FILM COATED ORAL
Qty: 360 TABLET | Refills: 2 | Status: SHIPPED | OUTPATIENT
Start: 2024-09-03

## 2024-09-10 DIAGNOSIS — J30.9 ALLERGIC RHINITIS, UNSPECIFIED SEASONALITY, UNSPECIFIED TRIGGER: ICD-10-CM

## 2024-09-10 RX ORDER — AZELASTINE 1 MG/ML
SPRAY, METERED NASAL
Qty: 60 ML | Refills: 1 | OUTPATIENT
Start: 2024-09-10

## 2024-09-10 RX ORDER — UBROGEPANT 100 MG/1
TABLET ORAL
Qty: 10 TABLET | OUTPATIENT
Start: 2024-09-10

## 2024-09-11 ENCOUNTER — OFFICE VISIT (OUTPATIENT)
Dept: CARDIOLOGY CLINIC | Age: 59
End: 2024-09-11
Payer: OTHER GOVERNMENT

## 2024-09-11 VITALS — HEART RATE: 76 BPM | SYSTOLIC BLOOD PRESSURE: 128 MMHG | DIASTOLIC BLOOD PRESSURE: 80 MMHG

## 2024-09-11 DIAGNOSIS — I48.0 PAROXYSMAL ATRIAL FIBRILLATION (HCC): Primary | ICD-10-CM

## 2024-09-11 PROCEDURE — 99213 OFFICE O/P EST LOW 20 MIN: CPT | Performed by: INTERNAL MEDICINE

## 2024-09-11 PROCEDURE — 3074F SYST BP LT 130 MM HG: CPT | Performed by: INTERNAL MEDICINE

## 2024-09-11 PROCEDURE — 3079F DIAST BP 80-89 MM HG: CPT | Performed by: INTERNAL MEDICINE

## 2024-09-11 RX ORDER — CONJUGATED ESTROGENS 0.62 MG/1
0.62 TABLET, FILM COATED ORAL DAILY
COMMUNITY
Start: 2024-08-02

## 2024-09-11 RX ORDER — TORSEMIDE 20 MG/1
20 TABLET ORAL DAILY PRN
Qty: 30 TABLET | Refills: 3 | Status: SHIPPED | OUTPATIENT
Start: 2024-09-11

## 2024-09-18 ENCOUNTER — TELEPHONE (OUTPATIENT)
Dept: FAMILY MEDICINE CLINIC | Age: 59
End: 2024-09-18

## 2024-09-23 ENCOUNTER — OFFICE VISIT (OUTPATIENT)
Dept: SURGERY | Age: 59
End: 2024-09-23
Payer: OTHER GOVERNMENT

## 2024-09-23 ENCOUNTER — OFFICE VISIT (OUTPATIENT)
Dept: ENT CLINIC | Age: 59
End: 2024-09-23
Payer: OTHER GOVERNMENT

## 2024-09-23 VITALS
OXYGEN SATURATION: 93 % | WEIGHT: 191 LBS | HEART RATE: 73 BPM | BODY MASS INDEX: 28.29 KG/M2 | SYSTOLIC BLOOD PRESSURE: 142 MMHG | DIASTOLIC BLOOD PRESSURE: 89 MMHG | TEMPERATURE: 97.1 F | HEIGHT: 69 IN

## 2024-09-23 VITALS
RESPIRATION RATE: 18 BRPM | HEIGHT: 69 IN | WEIGHT: 192.4 LBS | HEART RATE: 71 BPM | OXYGEN SATURATION: 96 % | BODY MASS INDEX: 28.5 KG/M2

## 2024-09-23 DIAGNOSIS — N64.4 BREAST PAIN: Primary | ICD-10-CM

## 2024-09-23 DIAGNOSIS — K21.9 LARYNGOPHARYNGEAL REFLUX (LPR): ICD-10-CM

## 2024-09-23 DIAGNOSIS — J30.9 ALLERGIC RHINITIS, UNSPECIFIED SEASONALITY, UNSPECIFIED TRIGGER: Primary | ICD-10-CM

## 2024-09-23 DIAGNOSIS — R49.0 DYSPHONIA: ICD-10-CM

## 2024-09-23 DIAGNOSIS — J45.20 INTERMITTENT ASTHMA WITHOUT COMPLICATION, UNSPECIFIED ASTHMA SEVERITY: ICD-10-CM

## 2024-09-23 PROCEDURE — 99213 OFFICE O/P EST LOW 20 MIN: CPT | Performed by: STUDENT IN AN ORGANIZED HEALTH CARE EDUCATION/TRAINING PROGRAM

## 2024-09-23 PROCEDURE — 3077F SYST BP >= 140 MM HG: CPT | Performed by: STUDENT IN AN ORGANIZED HEALTH CARE EDUCATION/TRAINING PROGRAM

## 2024-09-23 PROCEDURE — 31575 DIAGNOSTIC LARYNGOSCOPY: CPT | Performed by: STUDENT IN AN ORGANIZED HEALTH CARE EDUCATION/TRAINING PROGRAM

## 2024-09-23 PROCEDURE — 99204 OFFICE O/P NEW MOD 45 MIN: CPT | Performed by: NURSE PRACTITIONER

## 2024-09-23 PROCEDURE — 3079F DIAST BP 80-89 MM HG: CPT | Performed by: STUDENT IN AN ORGANIZED HEALTH CARE EDUCATION/TRAINING PROGRAM

## 2024-09-23 RX ORDER — AZELASTINE 1 MG/ML
SPRAY, METERED NASAL
Qty: 60 ML | Refills: 1 | Status: SHIPPED | OUTPATIENT
Start: 2024-09-23

## 2024-09-23 RX ORDER — LORATADINE 10 MG/1
10 TABLET ORAL DAILY
Qty: 30 TABLET | Refills: 3 | Status: SHIPPED | OUTPATIENT
Start: 2024-09-23

## 2024-09-26 DIAGNOSIS — F41.9 ANXIETY: ICD-10-CM

## 2024-09-26 RX ORDER — ALPRAZOLAM 2 MG
TABLET ORAL
Qty: 90 TABLET | Refills: 0 | Status: SHIPPED | OUTPATIENT
Start: 2024-09-26 | End: 2024-10-25

## 2024-09-26 RX ORDER — ALPRAZOLAM 2 MG
TABLET ORAL
Qty: 90 TABLET | OUTPATIENT
Start: 2024-09-26

## 2024-10-03 RX ORDER — POTASSIUM CHLORIDE 1500 MG/1
TABLET, EXTENDED RELEASE ORAL
Qty: 60 TABLET | Refills: 1 | Status: SHIPPED | OUTPATIENT
Start: 2024-10-03

## 2024-10-21 ENCOUNTER — TELEMEDICINE (OUTPATIENT)
Dept: FAMILY MEDICINE CLINIC | Age: 59
End: 2024-10-21
Payer: OTHER GOVERNMENT

## 2024-10-21 DIAGNOSIS — F51.01 PRIMARY INSOMNIA: ICD-10-CM

## 2024-10-21 DIAGNOSIS — F43.22 ADJUSTMENT DISORDER WITH ANXIETY: Primary | ICD-10-CM

## 2024-10-21 PROCEDURE — G2211 COMPLEX E/M VISIT ADD ON: HCPCS | Performed by: NURSE PRACTITIONER

## 2024-10-21 PROCEDURE — 99214 OFFICE O/P EST MOD 30 MIN: CPT | Performed by: NURSE PRACTITIONER

## 2024-10-21 NOTE — ASSESSMENT & PLAN NOTE
Chronic, worsening (exacerbation), patient reports to be hallucinating through the night and talking to people.  She does deny misusing her Ambien.  and medication adherence emphasized

## 2024-10-21 NOTE — PROGRESS NOTES
Ericka Eubanks, was evaluated through a synchronous (real-time) audio-video encounter. The patient (or guardian if applicable) is aware that this is a billable service, which includes applicable co-pays. This Virtual Visit was conducted with patient's (and/or legal guardian's) consent. Patient identification was verified, and a caregiver was present when appropriate.   The patient was located at Home: [address] [place] [id number]  Provider was located at Facility (Appt Dept): [address] [place], [place] [id number]  Confirm you are appropriately licensed, registered, or certified to deliver care in the state where the patient is located as indicated above. If you are not or unsure, please re-schedule the visit: Yes, I confirm.     Ericka Eubanks (:  1965) is a Established patient, presenting virtually for evaluation of the following:      Below is the assessment and plan developed based on review of pertinent history, physical exam, labs, studies, and medications.     Assessment & Plan  Adjustment disorder with anxiety   Chronic, worsening (exacerbation), I did recommend the patient that she go to psych emergency services to be fast tracked into the normal health system however she declines that at this time.  Pt to call [facility] to make appt.  If they don not have availability she will need to call her insurance for a provider list and then make an appt.  I will refer her to dr. Fountain for psychiatric care.  She declines in patient mental health, medications at this time and medication adherence emphasized         Primary insomnia   Chronic, worsening (exacerbation), patient reports to be hallucinating through the night and talking to people.  She does deny misusing her Ambien.  and medication adherence emphasized           No follow-ups on file.       Subjective   Patient today complaining of increased anxiety.  States she has been unable to get out of her pajamas in 5 days.

## 2024-10-21 NOTE — ASSESSMENT & PLAN NOTE
Chronic, worsening (exacerbation), I did recommend the patient that she go to psych emergency services to be fast tracked into the normal health system however she declines that at this time.  Pt to call [facility] to make appt.  If they don not have availability she will need to call her insurance for a provider list and then make an appt.  I will refer her to dr. Fountain for psychiatric care.  She declines in patient mental health, medications at this time and medication adherence emphasized

## 2024-10-26 DIAGNOSIS — F41.9 ANXIETY: ICD-10-CM

## 2024-10-28 NOTE — TELEPHONE ENCOUNTER
Last appointment: 10/21/2024  Next appointment: Visit date not found  Last refill:   Requested Prescriptions     Pending Prescriptions Disp Refills    ALPRAZolam (XANAX) 2 MG tablet [Pharmacy Med Name: ALPRAZOLAM 2MG TABLETS] 90 tablet      Sig: TAKE 1/2 TO 1 TABLET BY MOUTH UP TO THREE TIMES DAILY AS NEEDED FOR ANXIETY

## 2024-10-29 RX ORDER — ALPRAZOLAM 2 MG/1
TABLET ORAL
Qty: 90 TABLET | Refills: 0 | Status: SHIPPED | OUTPATIENT
Start: 2024-10-29 | End: 2024-10-29

## 2024-10-30 DIAGNOSIS — J30.9 ALLERGIC RHINITIS, UNSPECIFIED SEASONALITY, UNSPECIFIED TRIGGER: ICD-10-CM

## 2024-10-30 RX ORDER — MONTELUKAST SODIUM 10 MG/1
10 TABLET ORAL DAILY
Qty: 30 TABLET | Refills: 5 | Status: SHIPPED | OUTPATIENT
Start: 2024-10-30

## 2024-11-21 ENCOUNTER — CLINICAL DOCUMENTATION (OUTPATIENT)
Dept: SPEECH THERAPY | Age: 59
End: 2024-11-21

## 2024-11-21 NOTE — PROGRESS NOTES
[facility] ENT SLP  Late Cancellation/No-show Note  Name: Ericka Eubanks  YOB: 1965  MRN: [id number]  Cancelled visits to date: 0  No-shows to date: 1    OP attendance policy states 2 consecutive no-shows or 3 missed sessions within the last 60 days may result in discharge from therapy. Cancellation less than 12 hours before the appointment time is considered a no-show. Additionally, evaluation or therapy may be unable to be provided if patient arrives late by 15 minutes or more.     For today's appointment patient:  []  Late Cancelled  []  Rescheduled appointment  [x]  No-show     Reason given by patient:  []  Patient ill  []  Conflicting appointment  []  No transportation                          []  Conflict with work  [x]  No reason given  []  Other:                Comments:        Thank you,    Aide Johnson MA (Katie), CCC-SLP; [id number]  Speech-Language Pathologist

## 2024-11-25 DIAGNOSIS — F41.9 ANXIETY: ICD-10-CM

## 2024-11-25 NOTE — TELEPHONE ENCOUNTER
Medication:   Requested Prescriptions     Pending Prescriptions Disp Refills    ALPRAZolam (XANAX) 2 MG tablet [Pharmacy Med Name: ALPRAZOLAM 2MG TABLETS] 90 tablet      Sig: TAKE 1/2 TO 1 TABLET BY MOUTH UP TO THREE TIMES DAILY AS NEEDED FOR ANXIETY     Last Filled:      Last appt: 10/21/2024   Next appt: Visit date not found    Last [place]:       7/28/2021     3:42 PM   RX Monitoring   Periodic Controlled Substance Monitoring No signs of potential drug abuse or diversion identified.

## 2024-11-26 ENCOUNTER — TELEMEDICINE (OUTPATIENT)
Dept: FAMILY MEDICINE CLINIC | Age: 59
End: 2024-11-26

## 2024-11-26 DIAGNOSIS — G43.819 OTHER MIGRAINE WITHOUT STATUS MIGRAINOSUS, INTRACTABLE: Primary | ICD-10-CM

## 2024-11-26 DIAGNOSIS — F41.9 ANXIETY: ICD-10-CM

## 2024-11-26 DIAGNOSIS — F51.01 PRIMARY INSOMNIA: ICD-10-CM

## 2024-11-26 RX ORDER — ALPRAZOLAM 2 MG/1
TABLET ORAL
Qty: 90 TABLET | Refills: 0 | Status: SHIPPED | OUTPATIENT
Start: 2024-11-26 | End: 2024-11-26

## 2024-11-26 RX ORDER — ZOLPIDEM TARTRATE 10 MG/1
10 TABLET ORAL NIGHTLY PRN
Qty: 90 TABLET | Refills: 0 | Status: SHIPPED | OUTPATIENT
Start: 2024-11-26 | End: 2025-02-24

## 2024-11-26 RX ORDER — UBROGEPANT 100 MG/1
100 TABLET ORAL DAILY PRN
Qty: 16 TABLET | Refills: 5 | Status: SHIPPED | OUTPATIENT
Start: 2024-11-26

## 2024-11-26 RX ORDER — ALPRAZOLAM 2 MG/1
TABLET ORAL
Qty: 90 TABLET | OUTPATIENT
Start: 2024-11-26

## 2024-11-26 NOTE — PROGRESS NOTES
Ericka Eubanks, was evaluated through a synchronous (real-time) audio-video encounter. The patient (or guardian if applicable) is aware that this is a billable service, which includes applicable co-pays. This Virtual Visit was conducted with patient's (and/or legal guardian's) consent. Patient identification was verified, and a caregiver was present when appropriate.   The patient was located at Home: [address] [place] [id number]  Provider was located at Facility (Appt Dept): [address] [place], [place] [id number]  Confirm you are appropriately licensed, registered, or certified to deliver care in the state where the patient is located as indicated above. If you are not or unsure, please re-schedule the visit: Yes, I confirm.     Ericka Eubanks (:  1965) is a Established patient, presenting virtually for evaluation of the following:      Below is the assessment and plan developed based on review of pertinent history, physical exam, labs, studies, and medications.     Assessment & Plan  Anxiety   Chronic, at goal (stable), continue current treatment plan  [place] report has been reviewed and is consistent with patient's reported years.  No concern for diversion or misuse.  Orders:    ALPRAZolam (XANAX) 2 MG tablet; Take 2 mg three times daily    Primary insomnia   Chronic, at goal (stable), continue current treatment plan    Orders:    zolpidem (AMBIEN) 10 MG tablet; Take 1 tablet by mouth nightly as needed for Sleep for up to 90 days. Max Daily Amount: 10 mg    Other migraine without status migrainosus, intractable   Chronic, at goal (stable), continue current treatment plan    Orders:    Ubrogepant (UBRELVY) 100 MG TABS; Take 100 mg by mouth daily as needed (migraine)      No follow-ups on file.       Subjective   Patient presents today for refills on her anxiety and is on new medications.  She reports she is doing well on 2 mg 3 times daily.  That is down from 3 mg 3 times

## 2024-11-26 NOTE — TELEPHONE ENCOUNTER
Patient called to get scheduled however there are no more openings this week with Rebecca. Patient is very concerned because she has anxiety and needs her medication. Please call patient to let her know what her options are.     [phone] (home) [phone] (work)

## 2024-11-26 NOTE — ASSESSMENT & PLAN NOTE
Chronic, at goal (stable), continue current treatment plan    Orders:    zolpidem (AMBIEN) 10 MG tablet; Take 1 tablet by mouth nightly as needed for Sleep for up to 90 days. Max Daily Amount: 10 mg

## 2024-12-02 RX ORDER — POTASSIUM CHLORIDE 1500 MG/1
TABLET, EXTENDED RELEASE ORAL
Qty: 60 TABLET | Refills: 1 | Status: SHIPPED | OUTPATIENT
Start: 2024-12-02

## 2024-12-02 NOTE — TELEPHONE ENCOUNTER
Requested Prescriptions     Pending Prescriptions Disp Refills    potassium chloride (KLOR-CON M) 20 MEQ extended release tablet [Pharmacy Med Name: POTASSIUM CL 20MEQ ER TABLETS] 60 tablet 1     Sig: TAKE 1 TABLET BY MOUTH TWICE DAILY          Last Office Visit: 11/26/2024     Next Office Visit: Visit date not found

## 2024-12-03 ENCOUNTER — HOSPITAL ENCOUNTER (OUTPATIENT)
Dept: MAMMOGRAPHY | Age: 59
Discharge: HOME | End: 2024-12-03
Payer: OTHER GOVERNMENT

## 2024-12-03 VITALS — WEIGHT: 191 LBS | HEIGHT: 69 IN | BODY MASS INDEX: 28.29 KG/M2

## 2024-12-03 DIAGNOSIS — Z12.31 VISIT FOR SCREENING MAMMOGRAM: ICD-10-CM

## 2024-12-03 PROCEDURE — 77063 BREAST TOMOSYNTHESIS BI: CPT

## 2024-12-06 RX ORDER — FLECAINIDE ACETATE 50 MG/1
50 TABLET ORAL 2 TIMES DAILY
Qty: 180 TABLET | Refills: 3 | Status: SHIPPED | OUTPATIENT
Start: 2024-12-06

## 2024-12-18 DIAGNOSIS — J30.9 ALLERGIC RHINITIS, UNSPECIFIED SEASONALITY, UNSPECIFIED TRIGGER: ICD-10-CM

## 2024-12-18 RX ORDER — FLUTICASONE PROPIONATE 50 MCG
SPRAY, SUSPENSION (ML) NASAL
Qty: 32 G | Refills: 1 | OUTPATIENT
Start: 2024-12-18

## 2024-12-23 DIAGNOSIS — F41.9 ANXIETY: ICD-10-CM

## 2024-12-23 RX ORDER — ALPRAZOLAM 2 MG/1
TABLET ORAL
Qty: 90 TABLET | Refills: 0 | Status: SHIPPED | OUTPATIENT
Start: 2024-12-23 | End: 2025-01-23

## 2025-01-02 ENCOUNTER — TELEPHONE (OUTPATIENT)
Dept: ENT CLINIC | Age: 60
End: 2025-01-02

## 2025-01-02 NOTE — TELEPHONE ENCOUNTER
Refill request for Loratadine 10 mg tablet - take 1 daily.    Last filled on 9/23/2024 - 30 tablets with 3 refills.    Last OV 9/23/2024 - no future OV scheduled at this time.    Please advise. Thank you.

## 2025-01-07 RX ORDER — LORATADINE 10 MG/1
10 TABLET ORAL DAILY
Qty: 30 TABLET | Refills: 5 | Status: SHIPPED | OUTPATIENT
Start: 2025-01-07 | End: 2025-07-06

## 2025-01-21 DIAGNOSIS — F41.9 ANXIETY: ICD-10-CM

## 2025-01-21 DIAGNOSIS — F51.01 PRIMARY INSOMNIA: ICD-10-CM

## 2025-01-21 RX ORDER — ZOLPIDEM TARTRATE 10 MG/1
TABLET ORAL
Qty: 90 TABLET | OUTPATIENT
Start: 2025-01-21

## 2025-01-21 RX ORDER — ALPRAZOLAM 2 MG/1
TABLET ORAL
Qty: 90 TABLET | OUTPATIENT
Start: 2025-01-21

## 2025-01-21 NOTE — TELEPHONE ENCOUNTER
Medication:   Requested Prescriptions     Pending Prescriptions Disp Refills    zolpidem (AMBIEN) 10 MG tablet [Pharmacy Med Name: ZOLPIDEM 10MG TABLETS] 90 tablet      Sig: TAKE 1 TABLET BY MOUTH EVERY NIGHT AS NEEDED FOR SLEEP. MAX DAILY AMOUNT: 10 MG    ALPRAZolam (XANAX) 2 MG tablet [Pharmacy Med Name: ALPRAZOLAM 2MG TABLETS] 90 tablet      Sig: TAKE 1 TABLET BY MOUTH THREE TIMES DAILY     Last Filled:  12/23/24    Last appt: 11/26/2024   Next appt: Visit date not found    Last OARRS:       7/28/2021     3:42 PM   RX Monitoring   Periodic Controlled Substance Monitoring No signs of potential drug abuse or diversion identified.

## 2025-01-22 ENCOUNTER — TELEMEDICINE (OUTPATIENT)
Dept: FAMILY MEDICINE CLINIC | Age: 60
End: 2025-01-22

## 2025-01-22 DIAGNOSIS — F51.01 PRIMARY INSOMNIA: ICD-10-CM

## 2025-01-22 DIAGNOSIS — F41.9 ANXIETY: ICD-10-CM

## 2025-01-22 RX ORDER — ZOLPIDEM TARTRATE 10 MG/1
10 TABLET ORAL NIGHTLY PRN
Qty: 90 TABLET | Refills: 0 | Status: SHIPPED | OUTPATIENT
Start: 2025-01-22 | End: 2025-04-22

## 2025-01-22 RX ORDER — ALPRAZOLAM 2 MG/1
TABLET ORAL
Qty: 90 TABLET | Refills: 0 | Status: SHIPPED | OUTPATIENT
Start: 2025-01-22 | End: 2025-02-22

## 2025-01-22 SDOH — ECONOMIC STABILITY: FOOD INSECURITY: WITHIN THE PAST 12 MONTHS, YOU WORRIED THAT YOUR FOOD WOULD RUN OUT BEFORE YOU GOT MONEY TO BUY MORE.: PATIENT DECLINED

## 2025-01-22 SDOH — ECONOMIC STABILITY: FOOD INSECURITY: WITHIN THE PAST 12 MONTHS, THE FOOD YOU BOUGHT JUST DIDN'T LAST AND YOU DIDN'T HAVE MONEY TO GET MORE.: PATIENT DECLINED

## 2025-01-22 SDOH — ECONOMIC STABILITY: TRANSPORTATION INSECURITY
IN THE PAST 12 MONTHS, HAS THE LACK OF TRANSPORTATION KEPT YOU FROM MEDICAL APPOINTMENTS OR FROM GETTING MEDICATIONS?: PATIENT DECLINED

## 2025-01-22 SDOH — ECONOMIC STABILITY: INCOME INSECURITY: IN THE LAST 12 MONTHS, WAS THERE A TIME WHEN YOU WERE NOT ABLE TO PAY THE MORTGAGE OR RENT ON TIME?: PATIENT DECLINED

## 2025-01-22 SDOH — ECONOMIC STABILITY: TRANSPORTATION INSECURITY
IN THE PAST 12 MONTHS, HAS LACK OF TRANSPORTATION KEPT YOU FROM MEETINGS, WORK, OR FROM GETTING THINGS NEEDED FOR DAILY LIVING?: PATIENT DECLINED

## 2025-01-22 ASSESSMENT — PATIENT HEALTH QUESTIONNAIRE - PHQ9
1. LITTLE INTEREST OR PLEASURE IN DOING THINGS: NOT AT ALL
SUM OF ALL RESPONSES TO PHQ9 QUESTIONS 1 & 2: 0
2. FEELING DOWN, DEPRESSED OR HOPELESS: NOT AT ALL
SUM OF ALL RESPONSES TO PHQ QUESTIONS 1-9: 0

## 2025-01-22 NOTE — PROGRESS NOTES
Ericka Eubanks, was evaluated through a synchronous (real-time) audio-video encounter. The patient (or guardian if applicable) is aware that this is a billable service, which includes applicable co-pays. This Virtual Visit was conducted with patient's (and/or legal guardian's) consent. Patient identification was verified, and a caregiver was present when appropriate.   The patient was located at Home: [address] [place] [id number]  Provider was located at Facility (Appt Dept): [address] [place], [place] [id number]  Confirm you are appropriately licensed, registered, or certified to deliver care in the state where the patient is located as indicated above. If you are not or unsure, please re-schedule the visit: Yes, I confirm.     Ericka Eubanks (:  1965) is a Established patient, presenting virtually for evaluation of the following:      Below is the assessment and plan developed based on review of pertinent history, physical exam, labs, studies, and medications.     Assessment & Plan  Primary insomnia   Chronic, at goal (stable), continue current treatment plan    Orders:    zolpidem (AMBIEN) 10 MG tablet; Take 1 tablet by mouth nightly as needed for Sleep for up to 90 days. Max Daily Amount: 10 mg    Anxiety   Chronic, at goal (stable), continue current treatment plan    Orders:    ALPRAZolam (XANAX) 2 MG tablet; TAKE 1 TABLET BY MOUTH THREE TIMES DAILY      No follow-ups on file.       Subjective   Patient presents today for follow-up on her anxiety and insomnia medications.  She has been well-controlled in the past with 2 mg of Xanax 3 times daily.  She has tried to decrease her dosing on this with assistive talk therapy without good relief.  OARRS report is reviewed and is no concerns for diversion or misuse.  Has been stable on 10 mg of Ambien nightly for her insomnia.  Understands not to mix Ambien with her Xanax or with alcohol.  Overall she is doing much better than

## 2025-01-28 RX ORDER — TORSEMIDE 20 MG/1
TABLET ORAL
Qty: 90 TABLET | Refills: 3 | Status: SHIPPED | OUTPATIENT
Start: 2025-01-28

## 2025-01-28 RX ORDER — POTASSIUM CHLORIDE 1500 MG/1
TABLET, EXTENDED RELEASE ORAL
Qty: 60 TABLET | Refills: 1 | Status: SHIPPED | OUTPATIENT
Start: 2025-01-28

## 2025-01-28 NOTE — TELEPHONE ENCOUNTER
Medication:   Requested Prescriptions     Pending Prescriptions Disp Refills    potassium chloride (KLOR-CON M) 20 MEQ extended release tablet [Pharmacy Med Name: POTASSIUM CL 20MEQ ER TABLETS] 60 tablet 1     Sig: TAKE 1 TABLET BY MOUTH TWICE DAILY        Last Filled:  12/31/2024     Patient Phone Number: [phone] (home) [phone] (work)    Last appt: 1/22/2025   Next appt: Visit date not found    Last OARRS:       7/28/2021     3:42 PM   RX Monitoring   Periodic Controlled Substance Monitoring No signs of potential drug abuse or diversion identified.

## 2025-01-28 NOTE — TELEPHONE ENCOUNTER
Requested Prescriptions     Pending Prescriptions Disp Refills    torsemide (DEMADEX) 20 MG tablet [Pharmacy Med Name: TORSEMIDE 20MG TABLETS] 90 tablet 3     Sig: TAKE 1 TABLET BY MOUTH DAILY AS NEEDED FOR WEIGHT GAIN OR SWELLING            Checked Correct Pharmacy: Yes    Any changes since last refill? No     Number: 90  Refills: 3    Last OV: 9/11/2024 Provider: GOYO    Next OV: 3/19/2025 Provider: GOYO    Last Labs:   CBC:   Lab Results   Component Value Date    WBC 6.7 04/23/2024    HGB 14.5 04/23/2024    HCT 40.8 04/23/2024    MCV 99.5 04/23/2024     04/23/2024     CMP:   Lab Results   Component Value Date     04/23/2024    K 4.2 04/23/2024    CL 93 (L) 04/23/2024    CO2 26 04/23/2024    BUN 16 04/23/2024    CREATININE 0.8 04/23/2024    GLUCOSE 106 (H) 04/23/2024    CALCIUM 9.3 04/23/2024    BILITOT 0.4 04/23/2024    ALKPHOS 63 04/23/2024    AST 22 04/23/2024    ALT 22 04/23/2024    LABGLOM 85 04/23/2024    GFRAA >60 09/19/2022    AGRATIO 1.9 04/23/2024    GLOB 2.5 07/14/2020              [TextBox_4] : Is doing well.  She has more slight increase in the  pain in the sacral area.  She is following up on the PET scan her oncologist got a copy of the PET scan

## 2025-01-31 RX ORDER — APIXABAN 5 MG/1
TABLET, FILM COATED ORAL
Qty: 180 TABLET | Refills: 2 | Status: SHIPPED | OUTPATIENT
Start: 2025-01-31

## 2025-02-24 ENCOUNTER — TELEPHONE (OUTPATIENT)
Dept: CARDIOLOGY CLINIC | Age: 60
End: 2025-02-24

## 2025-02-24 DIAGNOSIS — F41.9 ANXIETY: ICD-10-CM

## 2025-02-24 NOTE — TELEPHONE ENCOUNTER
Pt requested sooner appointment from 02.27.2025  CARMINA Lindo to review and contact pt    [phone]

## 2025-02-24 NOTE — TELEPHONE ENCOUNTER
01/22/2025 LOV  02/27/2025 Scheduled FOV    Requested Prescriptions     Pending Prescriptions Disp Refills    ALPRAZolam (XANAX) 2 MG tablet [Pharmacy Med Name: ALPRAZOLAM 2MG TABLETS] 90 tablet      Sig: TAKE 1 TABLET BY MOUTH THREE TIMES DAILY

## 2025-02-25 ENCOUNTER — OFFICE VISIT (OUTPATIENT)
Dept: CARDIOLOGY CLINIC | Age: 60
End: 2025-02-25
Payer: OTHER GOVERNMENT

## 2025-02-25 VITALS
HEART RATE: 72 BPM | DIASTOLIC BLOOD PRESSURE: 70 MMHG | HEIGHT: 69 IN | BODY MASS INDEX: 28.29 KG/M2 | SYSTOLIC BLOOD PRESSURE: 120 MMHG | WEIGHT: 191 LBS

## 2025-02-25 DIAGNOSIS — I48.0 PAROXYSMAL ATRIAL FIBRILLATION (HCC): ICD-10-CM

## 2025-02-25 DIAGNOSIS — F41.9 ANXIETY: ICD-10-CM

## 2025-02-25 DIAGNOSIS — R07.9 CHEST PAIN, UNSPECIFIED TYPE: ICD-10-CM

## 2025-02-25 DIAGNOSIS — R06.02 SHORTNESS OF BREATH: ICD-10-CM

## 2025-02-25 DIAGNOSIS — R60.1 GENERALIZED EDEMA: ICD-10-CM

## 2025-02-25 DIAGNOSIS — I20.9 ANGINA PECTORIS: Primary | ICD-10-CM

## 2025-02-25 DIAGNOSIS — I10 ESSENTIAL HYPERTENSION: ICD-10-CM

## 2025-02-25 PROCEDURE — 99215 OFFICE O/P EST HI 40 MIN: CPT | Performed by: NURSE PRACTITIONER

## 2025-02-25 PROCEDURE — 3078F DIAST BP <80 MM HG: CPT | Performed by: NURSE PRACTITIONER

## 2025-02-25 PROCEDURE — 3074F SYST BP LT 130 MM HG: CPT | Performed by: NURSE PRACTITIONER

## 2025-02-25 PROCEDURE — 93000 ELECTROCARDIOGRAM COMPLETE: CPT | Performed by: NURSE PRACTITIONER

## 2025-02-25 RX ORDER — ALPRAZOLAM 2 MG/1
TABLET ORAL
Qty: 90 TABLET | Refills: 0 | Status: SHIPPED | OUTPATIENT
Start: 2025-02-25 | End: 2025-02-25 | Stop reason: SDUPTHER

## 2025-02-25 RX ORDER — ALPRAZOLAM 2 MG/1
TABLET ORAL
Qty: 90 TABLET | Refills: 0 | Status: SHIPPED | OUTPATIENT
Start: 2025-02-25 | End: 2025-03-25

## 2025-02-25 RX ORDER — ALPRAZOLAM 2 MG/1
TABLET ORAL
Qty: 90 TABLET | OUTPATIENT
Start: 2025-02-25

## 2025-02-25 NOTE — TELEPHONE ENCOUNTER
Pharmacy called stating they accidentally deleted the order and are requesting another be sent over.

## 2025-02-25 NOTE — PROGRESS NOTES
Lab Results   Component Value Date    ALT 22 2024    AST 22 2024    ALKPHOS 63 2024    BILITOT 0.4 2024     LIPIDS:  Lab Results   Component Value Date    CHOL 211 (H) 2018    TRIG 85 2018    HDL 86 (H) 2023     (H) 2023    VLDL 36 2023     Pro-BNP   Lab Results   Component Value Date/Time    PROBNP 41 2018 10:39 PM    PROBNP 117 2017 07:51 PM       IMAGIN Echo    Normal left ventricle size, wall thickness, and systolic function with an   estimated ejection fraction of 55-60%.   No regional wall motion abnormalities are seen.   Normal diastolic function.   Estimated pulmonary artery systolic pressure is at 25 mmHg assuming a right   atrial pressure of 3 mmHg.    Medications:   Current Outpatient Medications   Medication Sig Dispense Refill    ALPRAZolam (XANAX) 2 MG tablet TAKE 1 TABLET BY MOUTH THREE TIMES DAILY 90 tablet 0    ELIQUIS 5 MG TABS tablet TAKE 1 TABLET BY MOUTH TWICE DAILY 180 tablet 2    torsemide (DEMADEX) 20 MG tablet TAKE 1 TABLET BY MOUTH DAILY AS NEEDED FOR WEIGHT GAIN OR SWELLING 90 tablet 3    potassium chloride (KLOR-CON M) 20 MEQ extended release tablet TAKE 1 TABLET BY MOUTH TWICE DAILY 60 tablet 1    zolpidem (AMBIEN) 10 MG tablet Take 1 tablet by mouth nightly as needed for Sleep for up to 90 days. Max Daily Amount: 10 mg 90 tablet 0    loratadine (CLARITIN) 10 MG tablet Take 1 tablet by mouth daily 30 tablet 5    flecainide (TAMBOCOR) 50 MG tablet TAKE 1 TABLET BY MOUTH TWICE DAILY 180 tablet 3    Ubrogepant (UBRELVY) 100 MG TABS Take 100 mg by mouth daily as needed (migraine) 16 tablet 5    montelukast (SINGULAIR) 10 MG tablet TAKE 1 TABLET BY MOUTH DAILY 30 tablet 5    azelastine (ASTELIN) 0.1 % nasal spray USE 1 SPRAY IN EACH NOSTRIL TWICE DAILY AS DIRECTED 60 mL 1    PREMARIN 0.625 MG tablet Take 1 tablet by mouth daily      metoprolol tartrate (LOPRESSOR) 25 MG tablet TAKE 2 TABLETS(50 MG) BY MOUTH

## 2025-03-05 ENCOUNTER — TELEPHONE (OUTPATIENT)
Dept: CARDIOLOGY CLINIC | Age: 60
End: 2025-03-05

## 2025-03-05 DIAGNOSIS — R07.9 CHEST PAIN, UNSPECIFIED TYPE: Primary | ICD-10-CM

## 2025-03-05 NOTE — TELEPHONE ENCOUNTER
Patient would like a call back to discuss what the steps are for nuclear stress test. Patient is concerned about allergic reaction to radioactive dye.       Can reach pt at [phone]

## 2025-03-05 NOTE — TELEPHONE ENCOUNTER
Optimal testing would include either nuclear medicine/perfusion scan vs coronary CTA. IF those are contraindicated d/t reaction to nuclear medications and iodide contrast then I would recommend dobutamine echo.

## 2025-03-05 NOTE — TELEPHONE ENCOUNTER
If there is concern about reaction to nuclear medicine/radioactive dye then I recommend coronary CTA if she can tolerate iodide contrast dye which is not the same as nuclear medicine.     IF she cannot tolerate nuclear medicine or iodide contrast then we will need to consider other alternatives.

## 2025-03-07 NOTE — TELEPHONE ENCOUNTER
Pt called requesting to speak with nurse regarding previous messages. She is still unsure what tests she is to have done. I relayed info to her but she would like to discuss further.

## 2025-03-13 ENCOUNTER — TELEPHONE (OUTPATIENT)
Dept: CARDIOLOGY CLINIC | Age: 60
End: 2025-03-13

## 2025-03-13 NOTE — TELEPHONE ENCOUNTER
Please cancel the nuclear stress test order per [facility] stress test department. The patient has 2 active stress test orders.

## 2025-03-15 DIAGNOSIS — J45.20 INTERMITTENT ASTHMA WITHOUT COMPLICATION, UNSPECIFIED ASTHMA SEVERITY: ICD-10-CM

## 2025-03-17 RX ORDER — FLUTICASONE PROPIONATE AND SALMETEROL XINAFOATE 115; 21 UG/1; UG/1
2 AEROSOL, METERED RESPIRATORY (INHALATION) 2 TIMES DAILY
Qty: 12 G | Refills: 5 | Status: SHIPPED | OUTPATIENT
Start: 2025-03-17

## 2025-03-17 RX ORDER — ALBUTEROL SULFATE 90 UG/1
INHALANT RESPIRATORY (INHALATION)
Qty: 6.7 G | Refills: 1 | OUTPATIENT
Start: 2025-03-17

## 2025-03-18 ENCOUNTER — TELEPHONE (OUTPATIENT)
Dept: CARDIOLOGY CLINIC | Age: 60
End: 2025-03-18

## 2025-03-18 NOTE — TELEPHONE ENCOUNTER
Pt was canceled at [facility] for her Echo and Nuclear Stress Test this morning.    Patient was told for her stress echo (TTE) test that dobutamine may be too rough for her body?  Patient unsure if she can walk for a stress echo test. She is re-scheduled for stress echo on Thursday at [facility].     Callback number if not reachable at her number -  Roger [phone]    PM Blanche Mariano called pt back to clear up the order information and rescheduled the stress echo with dobutamine to [facility] tomorrow 3/19 at 1:30pm.

## 2025-03-18 NOTE — TELEPHONE ENCOUNTER
Spoke with patient at great length, listened to all her concerns.  After speaking with her-it was decided that it may be better to move the testing to [facility].  She agreed with said plan.  Scheduling was called and appointment for testing was moved to [facility] for completion.  Patient was called back to confirm this, as well as given test prep.  She was very appreciative of this and will notify our office if anything further is needed.

## 2025-03-19 ENCOUNTER — RESULTS FOLLOW-UP (OUTPATIENT)
Age: 60
End: 2025-03-19

## 2025-03-19 ENCOUNTER — TELEPHONE (OUTPATIENT)
Dept: CARDIOLOGY CLINIC | Age: 60
End: 2025-03-19

## 2025-03-19 ENCOUNTER — HOSPITAL ENCOUNTER (OUTPATIENT)
Age: 60
Discharge: HOME | End: 2025-03-21
Payer: OTHER GOVERNMENT

## 2025-03-19 VITALS
HEART RATE: 101 BPM | SYSTOLIC BLOOD PRESSURE: 124 MMHG | DIASTOLIC BLOOD PRESSURE: 75 MMHG | WEIGHT: 190 LBS | BODY MASS INDEX: 28.06 KG/M2

## 2025-03-19 DIAGNOSIS — J45.20 INTERMITTENT ASTHMA WITHOUT COMPLICATION, UNSPECIFIED ASTHMA SEVERITY: ICD-10-CM

## 2025-03-19 DIAGNOSIS — R07.9 CHEST PAIN, UNSPECIFIED TYPE: ICD-10-CM

## 2025-03-19 DIAGNOSIS — J30.9 ALLERGIC RHINITIS, UNSPECIFIED SEASONALITY, UNSPECIFIED TRIGGER: ICD-10-CM

## 2025-03-19 LAB
ECHO LV EF PHYSICIAN: 65 %
STRESS BASELINE DIAS BP: 80 MMHG
STRESS BASELINE HR: 92 BPM
STRESS BASELINE SYS BP: 126 MMHG
STRESS ESTIMATED WORKLOAD: 1 METS
STRESS PEAK DIAS BP: 80 MMHG
STRESS PEAK SYS BP: 134 MMHG
STRESS PERCENT HR ACHIEVED: 103 %
STRESS POST PEAK HR: 164 BPM
STRESS RATE PRESSURE PRODUCT: NORMAL BPM*MMHG
STRESS TARGET HR: 160 BPM

## 2025-03-19 PROCEDURE — 2580000003 HC RX 258: Performed by: INTERNAL MEDICINE

## 2025-03-19 PROCEDURE — C8928 TTE W OR W/O FOL W/CON,STRES: HCPCS

## 2025-03-19 PROCEDURE — 6360000004 HC RX CONTRAST MEDICATION: Performed by: INTERNAL MEDICINE

## 2025-03-19 PROCEDURE — 6360000002 HC RX W HCPCS: Performed by: NURSE PRACTITIONER

## 2025-03-19 PROCEDURE — 6360000004 HC RX CONTRAST MEDICATION: Performed by: NURSE PRACTITIONER

## 2025-03-19 RX ORDER — DOBUTAMINE HYDROCHLORIDE 200 MG/100ML
2.5-1 INJECTION INTRAVENOUS ONCE
Status: COMPLETED | OUTPATIENT
Start: 2025-03-19 | End: 2025-03-19

## 2025-03-19 RX ADMIN — DOBUTAMINE IN DEXTROSE 5 MCG/KG/MIN: 200 INJECTION, SOLUTION INTRAVENOUS at 14:26

## 2025-03-19 RX ADMIN — SULFUR HEXAFLUORIDE 2 ML: 60.7; .19; .19 INJECTION, POWDER, LYOPHILIZED, FOR SUSPENSION INTRAVENOUS; INTRAVESICAL at 14:14

## 2025-03-19 RX ADMIN — SODIUM CHLORIDE 10 ML: 9 INJECTION, SOLUTION INTRAMUSCULAR; INTRAVENOUS; SUBCUTANEOUS at 14:58

## 2025-03-19 ASSESSMENT — PAIN SCALES - GENERAL
PAINLEVEL_OUTOF10: 0
PAINLEVEL_OUTOF10: 0

## 2025-03-19 NOTE — TELEPHONE ENCOUNTER
Spoke with pt regarding results. Pt verbalized understanding and all questions answered at this time.

## 2025-03-19 NOTE — TELEPHONE ENCOUNTER
----- Message from Dr. Alec Ibarra MD sent at 3/19/2025  4:20 PM EDT -----  I reviewed the dobutamine stress echo.  Study is normal.  Cardiac function is normal.  The swelling in hands and feet may be related to inflammatory arthritis.  Advise follow-up with rheumatologist.  Continue salt and fluid restriction.  Please inform patient  RK

## 2025-03-20 RX ORDER — ALBUTEROL SULFATE 90 UG/1
INHALANT RESPIRATORY (INHALATION)
Qty: 6.7 G | Refills: 1 | Status: SHIPPED | OUTPATIENT
Start: 2025-03-20

## 2025-03-24 DIAGNOSIS — F41.9 ANXIETY: ICD-10-CM

## 2025-03-24 RX ORDER — ALPRAZOLAM 2 MG/1
2 TABLET ORAL 3 TIMES DAILY
Qty: 90 TABLET | Refills: 0 | Status: SHIPPED | OUTPATIENT
Start: 2025-03-24 | End: 2025-04-23

## 2025-03-24 RX ORDER — FLUTICASONE PROPIONATE 50 MCG
SPRAY, SUSPENSION (ML) NASAL
Qty: 32 G | Refills: 0 | Status: SHIPPED | OUTPATIENT
Start: 2025-03-24

## 2025-03-24 NOTE — TELEPHONE ENCOUNTER
Prescription sent for 2-month supply.  Patient will need to follow-up with Dr. Keith after his return.

## 2025-04-08 NOTE — PROGRESS NOTES
[facility]     Outpatient Cardiology         Patient Name:  Ericka Eubanks  Primary Care Physician: Rebecca Jama, APRN - CNP  04/08/25     Assessment & Plan    Assessment / Plan:     Elevated blood pressure without diagnosis of hypertension.  Blood pressure today is normal.  Continue Lopressor for paroxysmal A-fib.  Paroxysmal Afib-currently in sinus rhythm.  Continue Eliquis 5 mg twice daily and Flecainide 50 mg twice daily.  Stable.  Inflammatory arthritis-has generalized inflammatory arthritis.  Followed by rheumatology.  Patient was asking about RSV vaccine.  Will defer to PCP        Chief Complaint:     No chief complaint on file.      History of Present Illness:       HPI     Ericka Eubanksis a 59 y.o. female with PMH of HTN, afib, rheumatoid arthritis, and LATONIA.   Here for follow up.  He was seen by Monica Chadwick NP 2/25/25 and reported chest pains. Completed a stress echo that showed no ischemia and normal cardiac function.     Today, she presents for a follow up and palpitations.     Patient denies any chest pain, shortness of breath, palpitations, presyncope or syncope. No TIA. No claudication. No recent hospitalizations      Reviewed test, medications and labs  Torsemide as needed daily for swelling or weight gain  RTC 6 mth    PMH  Past Medical History:   Diagnosis Date    Alcohol abuse, in remission     Anxiety state, unspecified     Atrial fibrillation (HCC)     COVID     Depressive disorder, not elsewhere classified     Diffuse cystic mastopathy     Extrinsic asthma, unspecified     Hydronephrosis     Hypertension     Leiomyoma of uterus, unspecified     MS (multiple sclerosis) (HCC)     Neuropathy     LATONIA (obstructive sleep apnea)     does not use a CPAP machine at this time    Other and unspecified ovarian cyst     RA (rheumatoid arthritis) (HCC)        PSH  Past Surgical History:   Procedure Laterality Date    CHOLECYSTECTOMY      COLONOSCOPY N/A 08/16/2023

## 2025-04-09 ENCOUNTER — OFFICE VISIT (OUTPATIENT)
Dept: CARDIOLOGY CLINIC | Age: 60
End: 2025-04-09
Payer: OTHER GOVERNMENT

## 2025-04-09 VITALS
WEIGHT: 199 LBS | OXYGEN SATURATION: 98 % | SYSTOLIC BLOOD PRESSURE: 120 MMHG | BODY MASS INDEX: 29.39 KG/M2 | DIASTOLIC BLOOD PRESSURE: 84 MMHG | HEART RATE: 72 BPM

## 2025-04-09 DIAGNOSIS — R06.02 SHORTNESS OF BREATH: ICD-10-CM

## 2025-04-09 DIAGNOSIS — I48.0 PAROXYSMAL ATRIAL FIBRILLATION (HCC): Primary | ICD-10-CM

## 2025-04-09 DIAGNOSIS — M79.10 MUSCLE PAIN: ICD-10-CM

## 2025-04-09 PROCEDURE — 3079F DIAST BP 80-89 MM HG: CPT | Performed by: INTERNAL MEDICINE

## 2025-04-09 PROCEDURE — 99213 OFFICE O/P EST LOW 20 MIN: CPT | Performed by: INTERNAL MEDICINE

## 2025-04-09 PROCEDURE — 3074F SYST BP LT 130 MM HG: CPT | Performed by: INTERNAL MEDICINE

## 2025-04-09 PROCEDURE — G2211 COMPLEX E/M VISIT ADD ON: HCPCS | Performed by: INTERNAL MEDICINE

## 2025-04-09 PROCEDURE — 93000 ELECTROCARDIOGRAM COMPLETE: CPT | Performed by: INTERNAL MEDICINE

## 2025-04-09 NOTE — PROGRESS NOTES
[facility]     Outpatient Cardiology         Patient Name:  Ericka Eubanks  Primary Care Physician: Rebecca Jama, APRN - CNP  04/09/25     Assessment & Plan    Assessment / Plan:     Paroxysmal atrial fibrillation-currently in sinus rhythm.  Continue Eliquis and flecainide.  Has had 1 breakthrough episode.  Inflammatory arthritis-followed by rheumatology.  On Orencia.  Orencia can increase blood pressure.  Blood pressure today is normal.  Has normal LV function.  Could not appreciate RVSP due to inadequate TR jet.  Subjective sensation of fluid retention, shortness of breath-will check BNP level.  Has small joint swelling secondary to inflammatory arthritis.  Muscle pain in the left lower extremity which is affecting sleep-check CK level.  No claudication symptoms.  Follow-up in 6 months.            Chief Complaint:     Chief Complaint   Patient presents with    6 Month Follow-Up       History of Present Illness:       HPI     Ericka Wu a 59 y.o. female with PMH of HTN, afib, rheumatoid arthritis, and LATONIA.   Here for follow up.  She was seen by Monica Chadwick NP 2/25/25 and reported chest pains. Completed a stress echo that showed no ischemia and normal cardiac function.     Today, she reports she is having a sharp stabbing pain in her left thigh at night.  She states it wakes her up from a sound sleep. She has had two falls recently.  Patient denies any chest pain, shortness of breath, palpitations, presyncope or syncope. No TIA. No claudication. No recent hospitalizations      Reviewed test, medications and labs      PMH  Past Medical History:   Diagnosis Date    Alcohol abuse, in remission     Anxiety state, unspecified     Atrial fibrillation (HCC)     COVID     Depressive disorder, not elsewhere classified     Diffuse cystic mastopathy     Extrinsic asthma, unspecified     Hydronephrosis     Hypertension     Leiomyoma of uterus, unspecified     MS (multiple sclerosis)

## 2025-04-09 NOTE — PATIENT INSTRUCTIONS
Thank you for choosing [facility] for your cardiac care.    During your visit today, we reviewed and confirmed your cardiac medications along with  medication prescribed by your other healthcare team members. Please be sure to discuss any  changes to medication with your providers.    Please bring a list of ALL medications (or the bottles) with you to EVERY appointment.  Also include vitamins and over-the-counter medications.    If you need refills for any cardiac medications, please call your pharmacy and they will reach out to us electronically.    Did your provider order testing today? If yes, then you will receive your results in three  possible ways. You can receive a [email or web address] message, a phone call, or letter in the mail. Please  note, if you are an active [email or web address] user, some of your testing will be available within 1-2 days.    Finally, please know that it is good for your heart to exercise and follow a healthy, low-fat diet  as advised by your physician and health care providers.    If you are experiencing a medical emergency, please call 911 immediately.    It's easy to register for a [email or web address] account if you don't already have one. With a [email or web address]  account you can manage your health record, view test results, schedule appointments and  more.     Dr. Ibarra's clinical staff can be reached at the following phone number: [phone]    If any cardiac testing is ordered, please contact central scheduling at [phone] to get your test scheduled.

## 2025-04-10 ENCOUNTER — RESULTS FOLLOW-UP (OUTPATIENT)
Dept: CARDIOLOGY | Age: 60
End: 2025-04-10

## 2025-04-10 LAB
CK SERPL-CCNC: 52 U/L (ref 26–192)
NT-PROBNP SERPL-MCNC: 402 PG/ML (ref 0–124)

## 2025-04-17 DIAGNOSIS — F41.9 ANXIETY: ICD-10-CM

## 2025-04-17 RX ORDER — ALPRAZOLAM 2 MG/1
TABLET ORAL
Qty: 90 TABLET | OUTPATIENT
Start: 2025-04-17

## 2025-04-21 DIAGNOSIS — F41.9 ANXIETY: ICD-10-CM

## 2025-04-22 ENCOUNTER — TELEPHONE (OUTPATIENT)
Dept: FAMILY MEDICINE CLINIC | Age: 60
End: 2025-04-22

## 2025-04-22 ENCOUNTER — TELEMEDICINE (OUTPATIENT)
Dept: FAMILY MEDICINE CLINIC | Age: 60
End: 2025-04-22
Payer: OTHER GOVERNMENT

## 2025-04-22 DIAGNOSIS — F43.22 ADJUSTMENT DISORDER WITH ANXIETY: Primary | ICD-10-CM

## 2025-04-22 DIAGNOSIS — F51.01 PRIMARY INSOMNIA: ICD-10-CM

## 2025-04-22 DIAGNOSIS — M06.09 RHEUMATOID ARTHRITIS OF MULTIPLE SITES WITH NEGATIVE RHEUMATOID FACTOR (HCC): ICD-10-CM

## 2025-04-22 DIAGNOSIS — I48.0 PAROXYSMAL ATRIAL FIBRILLATION (HCC): ICD-10-CM

## 2025-04-22 PROBLEM — N64.4 BREAST PAIN: Status: RESOLVED | Noted: 2024-05-15 | Resolved: 2025-04-22

## 2025-04-22 PROBLEM — R41.89 COGNITIVE IMPAIRMENT: Status: RESOLVED | Noted: 2021-08-11 | Resolved: 2025-04-22

## 2025-04-22 PROCEDURE — G2211 COMPLEX E/M VISIT ADD ON: HCPCS | Performed by: NURSE PRACTITIONER

## 2025-04-22 PROCEDURE — 99214 OFFICE O/P EST MOD 30 MIN: CPT | Performed by: NURSE PRACTITIONER

## 2025-04-22 RX ORDER — ALPRAZOLAM 1 MG/1
1 TABLET ORAL 3 TIMES DAILY PRN
Qty: 30 TABLET | Refills: 0 | Status: SHIPPED | OUTPATIENT
Start: 2025-04-22 | End: 2025-05-22

## 2025-04-22 RX ORDER — ALPRAZOLAM 2 MG/1
TABLET ORAL
Qty: 90 TABLET | OUTPATIENT
Start: 2025-04-22

## 2025-04-22 NOTE — TELEPHONE ENCOUNTER
[facility] calling about clarification on the dosage of the ALPRAZolam (XANAX) 1 MG tablet .please advise if the dosage is correct.P [phone]

## 2025-04-22 NOTE — PROGRESS NOTES
Ericka Eubanks, was evaluated through a synchronous (real-time) audio-video encounter. The patient (or guardian if applicable) is aware that this is a billable service, which includes applicable co-pays. This Virtual Visit was conducted with patient's (and/or legal guardian's) consent. Patient identification was verified, and a caregiver was present when appropriate.   The patient was located at Home: [address] [place], [place] [address]   Provider was located at Facility (Appt Dept): [address] [place], [place] [id number]  Confirm you are appropriately licensed, registered, or certified to deliver care in the state where the patient is located as indicated above. If you are not or unsure, please re-schedule the visit: Yes, I confirm.     Ericka Eubanks (:  1965) is a 59 y.o. female, Established patient, here for evaluation of the following chief complaint(s):  Medication Check      Assessment & Plan  1. Anxiety.  - Currently taking Xanax 2 mg three times a day; advised to reduce dosage to 1 mg three times a day due to safety concerns with concurrent use of Tylenol 3 and Ambien.  - Reports increased anxiety due to personal stressors, including her 's  cancer.  - Discussed the need to consult with her therapist regarding the continuation of this medication and the potential need to see a psychiatrist for ongoing higher doses.  - A prescription refill for Xanax 1 mg three times a day will be provided for this month.     Diagnosis Orders   1. Adjustment disorder with anxiety  ALPRAZolam (XANAX) 1 MG tablet      2. Rheumatoid arthritis of multiple sites with negative rheumatoid factor (HCC)        3. Paroxysmal atrial fibrillation (HCC)        4. Primary insomnia            No follow-ups on file.         Subjective   HPI  History of Present Illness  The patient presents via virtual visit for evaluation of anxiety.    She is seeking a refill of her Xanax prescription, which

## 2025-04-28 ENCOUNTER — TELEPHONE (OUTPATIENT)
Dept: FAMILY MEDICINE CLINIC | Age: 60
End: 2025-04-28

## 2025-04-28 NOTE — TELEPHONE ENCOUNTER
Spoke to the patinet. She needs referral to Dr. Fountain to continue on medication per Rebecca. Last note sent over to Dr. Fountain.

## 2025-04-28 NOTE — TELEPHONE ENCOUNTER
PATIENT WOULD LIKE TO TALK TO ELADIO ABOUT SAYDA,BECAUSE SHE CAN'T GET HER MEDICATIONS. HER CALL BACK NUMBER IS  [phone]

## 2025-04-29 ENCOUNTER — TELEPHONE (OUTPATIENT)
Dept: PSYCHIATRY | Age: 60
End: 2025-04-29

## 2025-04-29 DIAGNOSIS — J30.9 ALLERGIC RHINITIS, UNSPECIFIED SEASONALITY, UNSPECIFIED TRIGGER: ICD-10-CM

## 2025-04-29 DIAGNOSIS — I10 ESSENTIAL HYPERTENSION: Primary | ICD-10-CM

## 2025-04-29 RX ORDER — POTASSIUM CHLORIDE 1500 MG/1
20 TABLET, EXTENDED RELEASE ORAL 2 TIMES DAILY
Qty: 60 TABLET | Refills: 1 | Status: SHIPPED | OUTPATIENT
Start: 2025-04-29

## 2025-04-29 RX ORDER — MONTELUKAST SODIUM 10 MG/1
10 TABLET ORAL DAILY
Qty: 30 TABLET | Refills: 0 | Status: SHIPPED | OUTPATIENT
Start: 2025-04-29

## 2025-04-30 ENCOUNTER — TELEPHONE (OUTPATIENT)
Dept: PSYCHIATRY | Age: 60
End: 2025-04-30

## 2025-04-30 ENCOUNTER — TELEPHONE (OUTPATIENT)
Dept: FAMILY MEDICINE CLINIC | Age: 60
End: 2025-04-30

## 2025-04-30 NOTE — TELEPHONE ENCOUNTER
My MA Daysi reached out to the patient at my request to inform her that in order to work with me, she would have to agree to weaning her Xanax. My MA made it clear that I am happy to see her and treat her anxiety, but I will not prescribe Xanax 6mg in combination with Ambien 10mg and codeine, because this is extremely dangerous, inappropriate and not something that I am willing to do.    The patient became upset and cancelled our appointment. If she changes her mind, I am happy to see her to work on non-benzodiazepine medications for her anxiety.    She is also welcome to seek a second opinion in the community.    Lexi Fountain MD  Psychiatry

## 2025-04-30 NOTE — TELEPHONE ENCOUNTER
Patient called regarding a recent phone call she stated she received (04/30/2025) that has her concerned with questions. She states she has an office visit tomorrow (05/01/2025) and she is asking for clarification on her phone voicemail message.    Patient's phone number is [phone].

## 2025-05-21 ENCOUNTER — TELEPHONE (OUTPATIENT)
Dept: ENT CLINIC | Age: 60
End: 2025-05-21

## 2025-05-21 RX ORDER — LORATADINE 10 MG/1
10 TABLET ORAL DAILY
Qty: 30 TABLET | Refills: 5 | Status: SHIPPED | OUTPATIENT
Start: 2025-05-21 | End: 2025-11-17

## 2025-05-21 NOTE — TELEPHONE ENCOUNTER
Refill Request:     Last Office Visit:  9/23/2024     Next Scheduled Visit :     Medication Requested:     loratadine (CLARITIN) 10 MG tablet        Pharmacy:    [id number] [place], [place] - [address] [phone] - F [phone] [address] [address]  Phone: [phone] Fax: [phone]

## 2025-06-23 DIAGNOSIS — J45.20 INTERMITTENT ASTHMA WITHOUT COMPLICATION, UNSPECIFIED ASTHMA SEVERITY: ICD-10-CM

## 2025-06-23 RX ORDER — FLUTICASONE PROPIONATE AND SALMETEROL XINAFOATE 115; 21 UG/1; UG/1
2 AEROSOL, METERED RESPIRATORY (INHALATION) 2 TIMES DAILY
Qty: 12 G | Refills: 5 | Status: CANCELLED | OUTPATIENT
Start: 2025-06-23

## 2025-06-23 RX ORDER — FLUTICASONE PROPIONATE AND SALMETEROL XINAFOATE 230; 21 UG/1; UG/1
2 AEROSOL, METERED RESPIRATORY (INHALATION) 2 TIMES DAILY
Qty: 1 EACH | Refills: 3 | Status: SHIPPED | OUTPATIENT
Start: 2025-06-23

## 2025-06-26 RX ORDER — METOPROLOL TARTRATE 25 MG/1
TABLET, FILM COATED ORAL
Qty: 360 TABLET | Refills: 3 | Status: SHIPPED | OUTPATIENT
Start: 2025-06-26

## 2025-06-26 NOTE — TELEPHONE ENCOUNTER
Requested Prescriptions     Pending Prescriptions Disp Refills    metoprolol tartrate (LOPRESSOR) 25 MG tablet [Pharmacy Med Name: METOPROLOL TARTRATE 25MG TABLETS] 360 tablet 3     Sig: TAKE 2 TABLETS(50 MG) BY MOUTH TWICE DAILY            Checked Correct Pharmacy: Yes    Any changes since last refill? No     Number: 360  Refills: 3    Last OV: 4/9/2025 Provider: GOYO    Next OV: 07.11.2025Provider: GOYO    Last Labs:   CBC:  Lab Results   Component Value Date    WBC 6.7 04/23/2024    HGB 14.5 04/23/2024    HCT 40.8 04/23/2024    MCV 99.5 04/23/2024     04/23/2024    LYMPHOPCT 33.4 04/23/2024    RBC 4.10 04/23/2024    MCH 35.3 (H) 04/23/2024    MCHC 35.5 04/23/2024    RDW 13.1 04/23/2024           CMP:  Lab Results   Component Value Date     04/23/2024    K 4.2 04/23/2024    CL 93 (L) 04/23/2024    CO2 26 04/23/2024    BUN 16 04/23/2024    CREATININE 0.8 04/23/2024    GLUCOSE 106 (H) 04/23/2024    CALCIUM 9.3 04/23/2024    BILITOT 0.4 04/23/2024    ALKPHOS 63 04/23/2024    AST 22 04/23/2024    ALT 22 04/23/2024    LABGLOM 85 04/23/2024    GFRAA >60 09/19/2022    AGRATIO 1.9 04/23/2024    GLOB 2.5 07/14/2020

## 2025-07-03 NOTE — PATIENT INSTRUCTIONS
Thank you for choosing [facility] for your cardiac care.    During your visit today, we reviewed and confirmed your cardiac medications along with  medication prescribed by your other healthcare team members. Please be sure to discuss any  changes to medication with your providers.    Please bring a list of ALL medications (or the bottles) with you to EVERY appointment.  Also include vitamins and over-the-counter medications.    If you need refills for any cardiac medications, please call your pharmacy and they will reach out to us electronically.    Did your provider order testing today? If yes, then you will receive your results in three  possible ways. You can receive a [employer] message, a phone call, or letter in the mail. Please  note, if you are an active [employer] user, some of your testing will be available within 1-2 days.    Finally, please know that it is good for your heart to exercise and follow a healthy, low-fat diet  as advised by your physician and health care providers.    If you are experiencing a medical emergency, please call 911 immediately.    It's easy to register for a [employer] account if you don't already have one. With a [employer]  account you can manage your health record, view test results, schedule appointments and  more.     Dr. Ibarra's clinical staff can be reached at the following phone number: [phone]    If any cardiac testing is ordered, please contact central scheduling at [phone] to get your test scheduled.

## 2025-07-03 NOTE — PROGRESS NOTES
[facility]     Outpatient Cardiology         Patient Name:  Ericka Eubanks  Primary Care Physician: Rebecca Jama, APRN - CNP  07/11/25     Assessment & Plan    Assessment / Plan:     Paroxysmal atrial fibrillation-currently in sinus rhythm.  Continue Eliquis and flecainide.  No bleeding.  No embolic symptoms.  Check CBC  Inflammatory arthritis-followed by rheumatology.    Blood pressure today is acceptable.  Subjective sensation of fluid retention, shortness of breath- Has small joint swelling secondary to inflammatory arthritis.  Last CK level was normal.  Check labs.  Had normal stress echo.  Stable from cardiac standpoint.  EKG today is normal.  Follow-up in 6 months.            Chief Complaint:     Chief Complaint   Patient presents with    Follow-up    Hypertension       History of Present Illness:       HPI     Ericka Eubanksis a 59 y.o. female with PMH of HTN, afib, rheumatoid arthritis, and LATONIA. Here for a follow up.      Today she reports she continues to have issues with palpitations. It happened yesterday and lasted for about an hour.  Patient denies any chest pain, shortness of breath, palpitations, presyncope or syncope. No TIA. No claudication. No recent hospitalizations      Reviewed test, medications and labs      PMH  Past Medical History:   Diagnosis Date    Alcohol abuse, in remission     Anxiety state, unspecified     Atrial fibrillation (HCC)     COVID     Depressive disorder, not elsewhere classified     Diffuse cystic mastopathy     Extrinsic asthma, unspecified     Hydronephrosis     Hypertension     Leiomyoma of uterus, unspecified     MS (multiple sclerosis) (HCC)     Neuropathy     LATONIA (obstructive sleep apnea)     does not use a CPAP machine at this time    Other and unspecified ovarian cyst     RA (rheumatoid arthritis) (HCC)        PSH  Past Surgical History:   Procedure Laterality Date    CHOLECYSTECTOMY      COLONOSCOPY N/A 08/16/2023    COLONOSCOPY

## 2025-07-11 ENCOUNTER — OFFICE VISIT (OUTPATIENT)
Dept: CARDIOLOGY CLINIC | Age: 60
End: 2025-07-11
Payer: OTHER GOVERNMENT

## 2025-07-11 VITALS
HEART RATE: 71 BPM | WEIGHT: 194 LBS | OXYGEN SATURATION: 96 % | DIASTOLIC BLOOD PRESSURE: 80 MMHG | SYSTOLIC BLOOD PRESSURE: 130 MMHG | BODY MASS INDEX: 28.65 KG/M2

## 2025-07-11 DIAGNOSIS — I10 ESSENTIAL HYPERTENSION: ICD-10-CM

## 2025-07-11 DIAGNOSIS — I48.0 PAROXYSMAL ATRIAL FIBRILLATION (HCC): ICD-10-CM

## 2025-07-11 DIAGNOSIS — I48.0 PAROXYSMAL ATRIAL FIBRILLATION (HCC): Primary | ICD-10-CM

## 2025-07-11 LAB
ALBUMIN SERPL-MCNC: 4.4 G/DL (ref 3.4–5)
ALBUMIN/GLOB SERPL: 2 {RATIO} (ref 1.1–2.2)
ALP SERPL-CCNC: 73 U/L (ref 40–129)
ALT SERPL-CCNC: 30 U/L (ref 10–40)
ANION GAP SERPL CALCULATED.3IONS-SCNC: 11 MMOL/L (ref 3–16)
AST SERPL-CCNC: 24 U/L (ref 15–37)
BILIRUB SERPL-MCNC: 0.4 MG/DL (ref 0–1)
BUN SERPL-MCNC: 18 MG/DL (ref 7–20)
CALCIUM SERPL-MCNC: 9.3 MG/DL (ref 8.3–10.6)
CHLORIDE SERPL-SCNC: 101 MMOL/L (ref 99–110)
CO2 SERPL-SCNC: 25 MMOL/L (ref 21–32)
CREAT SERPL-MCNC: 0.7 MG/DL (ref 0.6–1.2)
DEPRECATED RDW RBC AUTO: 12.7 % (ref 12.4–15.4)
GFR SERPLBLD CREATININE-BSD FMLA CKD-EPI: >90 ML/MIN/{1.73_M2}
GLUCOSE SERPL-MCNC: 106 MG/DL (ref 70–99)
HCT VFR BLD AUTO: 42.5 % (ref 36–48)
HGB BLD-MCNC: 14.9 G/DL (ref 12–16)
MAGNESIUM SERPL-MCNC: 2.1 MG/DL (ref 1.8–2.4)
MCH RBC QN AUTO: 34.1 PG (ref 26–34)
MCHC RBC AUTO-ENTMCNC: 35.1 G/DL (ref 31–36)
MCV RBC AUTO: 97.1 FL (ref 80–100)
PLATELET # BLD AUTO: 226 K/UL (ref 135–450)
PMV BLD AUTO: 8 FL (ref 5–10.5)
POTASSIUM SERPL-SCNC: 4 MMOL/L (ref 3.5–5.1)
PROT SERPL-MCNC: 6.6 G/DL (ref 6.4–8.2)
RBC # BLD AUTO: 4.38 M/UL (ref 4–5.2)
SODIUM SERPL-SCNC: 137 MMOL/L (ref 136–145)
WBC # BLD AUTO: 7.8 K/UL (ref 4–11)

## 2025-07-11 PROCEDURE — 3075F SYST BP GE 130 - 139MM HG: CPT | Performed by: INTERNAL MEDICINE

## 2025-07-11 PROCEDURE — 99214 OFFICE O/P EST MOD 30 MIN: CPT | Performed by: INTERNAL MEDICINE

## 2025-07-11 PROCEDURE — 3079F DIAST BP 80-89 MM HG: CPT | Performed by: INTERNAL MEDICINE

## 2025-07-11 PROCEDURE — 93000 ELECTROCARDIOGRAM COMPLETE: CPT | Performed by: INTERNAL MEDICINE

## 2025-07-21 DIAGNOSIS — J30.9 ALLERGIC RHINITIS, UNSPECIFIED SEASONALITY, UNSPECIFIED TRIGGER: ICD-10-CM

## 2025-07-21 RX ORDER — MONTELUKAST SODIUM 10 MG/1
10 TABLET ORAL DAILY
Qty: 30 TABLET | Refills: 2 | Status: SHIPPED | OUTPATIENT
Start: 2025-07-21

## 2025-07-24 DIAGNOSIS — J30.9 ALLERGIC RHINITIS, UNSPECIFIED SEASONALITY, UNSPECIFIED TRIGGER: ICD-10-CM

## 2025-07-24 RX ORDER — FLUTICASONE PROPIONATE 50 MCG
SPRAY, SUSPENSION (ML) NASAL
Qty: 32 G | Refills: 0 | Status: SHIPPED | OUTPATIENT
Start: 2025-07-24

## (undated) DEVICE — FORCEPS BX L240CM WRK CHN 2.8MM STD CAP W/ NDL MIC MESH

## (undated) DEVICE — CANNULA SAMP CO2 AD GRN 7FT CO2 AND 7FT O2 TBNG UNIV CONN

## (undated) DEVICE — FORCEPS BX L240CM JAW DIA2.8MM L CAP W/ NDL MIC MESH TOOTH

## (undated) DEVICE — GUIDEWIRE WITH SPRING TIP - SINGLE USE: Brand: SAFEGUIDE®